# Patient Record
Sex: MALE | Race: WHITE | NOT HISPANIC OR LATINO | Employment: OTHER | URBAN - METROPOLITAN AREA
[De-identification: names, ages, dates, MRNs, and addresses within clinical notes are randomized per-mention and may not be internally consistent; named-entity substitution may affect disease eponyms.]

---

## 2017-07-05 ENCOUNTER — APPOINTMENT (OUTPATIENT)
Dept: LAB | Facility: HOSPITAL | Age: 67
End: 2017-07-05
Attending: UROLOGY
Payer: MEDICARE

## 2017-07-05 ENCOUNTER — TRANSCRIBE ORDERS (OUTPATIENT)
Dept: LAB | Facility: HOSPITAL | Age: 67
End: 2017-07-05

## 2017-07-05 ENCOUNTER — HOSPITAL ENCOUNTER (OUTPATIENT)
Dept: RADIOLOGY | Facility: HOSPITAL | Age: 67
Discharge: HOME | End: 2017-07-05
Attending: UROLOGY
Payer: MEDICARE

## 2017-07-05 DIAGNOSIS — N40.0 ENLARGED PROSTATE WITHOUT LOWER URINARY TRACT SYMPTOMS (LUTS): ICD-10-CM

## 2017-07-05 DIAGNOSIS — Z12.5 SPECIAL SCREENING FOR MALIGNANT NEOPLASM OF PROSTATE: Primary | ICD-10-CM

## 2017-07-05 DIAGNOSIS — N20.0 CALCULUS OF KIDNEY: ICD-10-CM

## 2017-07-05 DIAGNOSIS — Z12.5 ENCOUNTER FOR SCREENING FOR MALIGNANT NEOPLASM OF PROSTATE: ICD-10-CM

## 2017-07-05 LAB — PSA SERPL-MCNC: 2.9 NG/ML (ref 0–4)

## 2017-07-05 PROCEDURE — 74000 HB X-RAY EXAM OF ABDOMEN (SINGLE ANTEROPOSTERIOR VIEW): CPT

## 2017-07-05 PROCEDURE — 36415 COLL VENOUS BLD VENIPUNCTURE: CPT

## 2017-07-05 PROCEDURE — G0103 PSA SCREENING: HCPCS

## 2017-07-11 ENCOUNTER — ALLSCRIPTS OFFICE VISIT (OUTPATIENT)
Dept: OTHER | Facility: OTHER | Age: 67
End: 2017-07-11

## 2017-07-11 DIAGNOSIS — N20.0 CALCULUS OF KIDNEY: ICD-10-CM

## 2017-07-11 DIAGNOSIS — Z12.5 ENCOUNTER FOR SCREENING FOR MALIGNANT NEOPLASM OF PROSTATE: ICD-10-CM

## 2017-10-31 ENCOUNTER — ALLSCRIPTS OFFICE VISIT (OUTPATIENT)
Dept: OTHER | Facility: OTHER | Age: 67
End: 2017-10-31

## 2017-10-31 DIAGNOSIS — R73.01 IMPAIRED FASTING GLUCOSE: ICD-10-CM

## 2017-10-31 DIAGNOSIS — E78.5 HYPERLIPIDEMIA: ICD-10-CM
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
## 2017-11-13 ENCOUNTER — APPOINTMENT (OUTPATIENT)
Dept: LAB | Facility: HOSPITAL | Age: 67
End: 2017-11-13
Payer: MEDICARE

## 2017-11-13 ENCOUNTER — GENERIC CONVERSION - ENCOUNTER (OUTPATIENT)
Dept: OTHER | Facility: OTHER | Age: 67
End: 2017-11-13

## 2017-11-13 ENCOUNTER — TRANSCRIBE ORDERS (OUTPATIENT)
Dept: LAB | Facility: HOSPITAL | Age: 67
End: 2017-11-13

## 2017-11-13 DIAGNOSIS — R73.01 IMPAIRED FASTING GLUCOSE: ICD-10-CM

## 2017-11-13 DIAGNOSIS — E78.5 HYPERLIPIDEMIA: ICD-10-CM

## 2017-11-13 LAB
ALBUMIN SERPL BCP-MCNC: 3.9 G/DL (ref 3.5–5)
ALP SERPL-CCNC: 37 U/L (ref 46–116)
ALT SERPL W P-5'-P-CCNC: 38 U/L (ref 12–78)
ANION GAP SERPL CALCULATED.3IONS-SCNC: 6 MMOL/L (ref 4–13)
AST SERPL W P-5'-P-CCNC: 32 U/L (ref 5–45)
BILIRUB SERPL-MCNC: 0.74 MG/DL (ref 0.2–1)
BUN SERPL-MCNC: 18 MG/DL (ref 5–25)
CALCIUM SERPL-MCNC: 9.2 MG/DL (ref 8.3–10.1)
CHLORIDE SERPL-SCNC: 105 MMOL/L (ref 100–108)
CHOLEST SERPL-MCNC: 145 MG/DL (ref 50–200)
CO2 SERPL-SCNC: 31 MMOL/L (ref 21–32)
CREAT SERPL-MCNC: 1.26 MG/DL (ref 0.6–1.3)
EST. AVERAGE GLUCOSE BLD GHB EST-MCNC: 126 MG/DL
GFR SERPL CREATININE-BSD FRML MDRD: 59 ML/MIN/1.73SQ M
GLUCOSE P FAST SERPL-MCNC: 77 MG/DL (ref 65–99)
HBA1C MFR BLD: 6 % (ref 4.2–6.3)
HDLC SERPL-MCNC: 37 MG/DL (ref 40–60)
LDLC SERPL CALC-MCNC: 74 MG/DL (ref 0–100)
POTASSIUM SERPL-SCNC: 3.4 MMOL/L (ref 3.5–5.3)
PROT SERPL-MCNC: 7.6 G/DL (ref 6.4–8.2)
SODIUM SERPL-SCNC: 142 MMOL/L (ref 136–145)
TRIGL SERPL-MCNC: 169 MG/DL

## 2017-11-13 PROCEDURE — 83036 HEMOGLOBIN GLYCOSYLATED A1C: CPT

## 2017-11-13 PROCEDURE — 36415 COLL VENOUS BLD VENIPUNCTURE: CPT

## 2017-11-13 PROCEDURE — 80053 COMPREHEN METABOLIC PANEL: CPT

## 2017-11-13 PROCEDURE — 80061 LIPID PANEL: CPT

## 2018-01-09 NOTE — RESULT NOTES
Verified Results  (1) COMPREHENSIVE METABOLIC PANEL 50CWI4276 34:31ZS Kelly Bellamy    Order Number: [id number]     Test Name Result Flag Reference   SODIUM 142 mmol/L  136-145   POTASSIUM 3 4 mmol/L L 3 5-5 3   CHLORIDE 105 mmol/L  100-108   CARBON DIOXIDE 31 mmol/L  21-32   ANION GAP (CALC) 6 mmol/L  4-13   BLOOD UREA NITROGEN 18 mg/dL  5-25   CREATININE 1 26 mg/dL  0 60-1 30   Standardized to IDMS reference method   CALCIUM 9 2 mg/dL  8 3-10 1   BILI, TOTAL 0 74 mg/dL  0 20-1 00   ALK PHOSPHATAS 37 U/L L    ALT (SGPT) 38 U/L  12-78   Specimen collection should occur prior to Sulfasalazine and/or Sulfapyridine administration due to the potential for falsely depressed results  AST(SGOT) 32 U/L  5-45   Specimen collection should occur prior to Sulfasalazine administration due to the potential for falsely depressed results  ALBUMIN 3 9 g/dL  3 5-5 0   TOTAL PROTEIN 7 6 g/dL  6 4-8 2   eGFR 59 ml/min/1 73sq m     [facility] Disease Education Program recommendations are as follows:  GFR calculation is accurate only with a steady state creatinine  Chronic Kidney disease less than 60 ml/min/1 73 sq  meters  Kidney failure less than 15 ml/min/1 73 sq  meters  GLUCOSE FASTING 77 mg/dL  65-99   Specimen collection should occur prior to Sulfasalazine administration due to the potential for falsely depressed results  Specimen collection should occur prior to Sulfapyridine administration due to the potential for falsely elevated results  (1) LIPID PANEL, FASTING 68BUW3108 09:03AM J[facility] Order Number: [id number]     Test Name Result Flag Reference   CHOLESTEROL 145 mg/dL     HDL,DIRECT 37 mg/dL L 40-60   Specimen collection should occur prior to Metamizole administration due to the potential for falsley depressed results     LDL CHOLESTEROL CALCULATED 74 mg/dL  0-100   Triglyceride:        Normal <150 mg/dl   Borderline High 150-199 mg/dl   High 200-499 mg/dl   Very High >499 mg/dl      Cholesterol:       Desirable <200 mg/dl    Borderline High 200-239 mg/dl    High >239 mg/dl      HDL Cholesterol:       High>59 mg/dL    Low <41 mg/dL      This screening LDL is a calculated result  It does not have the accuracy of the Direct Measured LDL in the monitoring of patients with hyperlipidemia and/or statin therapy  Direct Measure LDL (GET083) must be ordered separately in these patients  TRIGLYCERIDES 169 mg/dL H <=150   Specimen collection should occur prior to N-Acetylcysteine or Metamizole administration due to the potential for falsely depressed results  (1) HEMOGLOBIN A1C 13FBX3178 09:03AM Patricia Brand Order Number: [id number]     Test Name Result Flag Reference   HEMOGLOBIN A1C 6 0 %  4 2-6 3   EST  AVG   GLUCOSE 126 mg/dl

## 2018-01-12 NOTE — RESULT NOTES
Message  I called patient with blood work results  Recommended to follow a low fat, low cholesterol diet,  regular exercise  Continue current medications  Recommended to increase dietary potassium intake:  bananas,  orange juice  Recheck labs in one year  Patient declined Medicare wellness visit  Verified Results  (1) CBC/PLT/DIFF 07SCX2651 07:45AM Chris Bacay   TW Order Number: [id number]     Test Name Result Flag Reference   WBC COUNT 5 75 Thousand/uL  4 31-10 16   RBC COUNT 4 67 Million/uL  3 88-5 62   HEMOGLOBIN 13 8 g/dL  12 0-17 0   HEMATOCRIT 41 1 %  36 5-49 3   MCV 88 fL  82-98   MCH 29 6 pg  26 8-34 3   MCHC 33 6 g/dL  31 4-37 4   RDW 13 7 %  11 6-15 1   MPV 12 2 fL  8 9-12 7   PLATELET COUNT 553 Thousands/uL  149-390   nRBC AUTOMATED 0 /100 WBCs     NEUTROPHILS RELATIVE PERCENT 46 %  43-75   LYMPHOCYTES RELATIVE PERCENT 38 %  14-44   MONOCYTES RELATIVE PERCENT 12 %  4-12   EOSINOPHILS RELATIVE PERCENT 3 %  0-6   BASOPHILS RELATIVE PERCENT 1 %  0-1   NEUTROPHILS ABSOLUTE COUNT 2 70 Thousands/?L  1 85-7 62   LYMPHOCYTES ABSOLUTE COUNT 2 17 Thousands/?L  0 60-4 47   MONOCYTES ABSOLUTE COUNT 0 66 Thousand/?L  0 17-1 22   EOSINOPHILS ABSOLUTE COUNT 0 15 Thousand/?L  0 00-0 61   BASOPHILS ABSOLUTE COUNT 0 06 Thousands/?L  0 00-0 10   - Patient Instructions: This bloodwork is non-fasting  Please drink two glasses of water morning of bloodwork  - Patient Instructions: This bloodwork is non-fasting  Please drink two glasses of water morning of bloodwork  (1) COMPREHENSIVE METABOLIC PANEL 23YET8570 79:79LU Chris Bacay    Order Number: [id number]     Test Name Result Flag Reference   GLUCOSE,RANDM 92 mg/dL     If the patient is fasting, the ADA then defines impaired fasting glucose as > 100 mg/dL and diabetes as > or equal to 123 mg/dL     SODIUM 139 mmol/L  136-145   POTASSIUM 3 5 mmol/L  3 5-5 3   CHLORIDE 102 mmol/L  100-108   CARBON DIOXIDE 31 mmol/L 21-32   ANION GAP (CALC) 6 mmol/L  4-13   BLOOD UREA NITROGEN 19 mg/dL  5-25   CREATININE 1 25 mg/dL  0 60-1 30   Standardized to IDMS reference method   CALCIUM 9 2 mg/dL  8 3-10 1   BILI, TOTAL 0 44 mg/dL  0 20-1 00   ALK PHOSPHATAS 42 U/L L    ALT (SGPT) 37 U/L  12-78   AST(SGOT) 22 U/L  5-45   ALBUMIN 4 0 g/dL  3 5-5 0   TOTAL PROTEIN 7 7 g/dL  6 4-8 2   eGFR Non-African American 57 8 ml/min/1 73sq m     - Patient Instructions: This is a fasting blood test  Water,black tea or black  coffee only after 9:00pm the night before test Drink 2 glasses of water the morning of test - Patient Instructions: This bloodwork is non-fasting  Please drink two glasses of   water morning of bloodwork  National Kidney Disease Education Program recommendations are as follows:  GFR calculation is accurate only with a steady state creatinine  Chronic Kidney disease less than 60 ml/min/1 73 sq  meters  Kidney failure less than 15 ml/min/1 73 sq  meters  (1) TSH 34FJC1418 07:45AM Lew Callaway Order Number: [id number]     Test Name Result Flag Reference   TSH 3 330 uIU/mL  0 358-3 740   - Patient Instructions: This bloodwork is non-fasting  Please drink two glasses of water morning of bloodwork  - Patient Instructions: This is a fasting blood test  Water,black tea or black  coffee only after 9:00pm the night before test Drink 2 glasses of water the morning of test - Patient Instructions: This bloodwork is non-fasting  Please drink two glasses of   water morning of bloodwork  Patients undergoing fluorescein dye angiography may retain small amounts of fluorescein in the body for 48-72 hours post procedure  Samples containing fluorescein can produce falsely depressed TSH values  If the patient had this procedure,a specimen should be resubmitted post fluorescein clearance       (1) VITAMIN B12 08TNC3352 07:45AM Lew Callaway Order Number: [id number]     Test Name Result Flag Reference VITAMIN B12 398 pg/mL  100-900   - Patient Instructions: This is a fasting blood test  Water,black tea or black  coffee only after 9:00pm the night before test Drink 2 glasses of water the morning of test - Patient Instructions: This bloodwork is non-fasting  Please drink two glasses of   water morning of bloodwork  (1) HEMOGLOBIN A1C 11UOE4668 07:45AM [facility] Order Number: [id number]     Test Name Result Flag Reference   HEMOGLOBIN A1C 5 9 %  4 2-6 3   EST  AVG  GLUCOSE 123 mg/dl       (1) LIPID PANEL, FASTING [id number] 07:45AM Khurram Naylor Order Number: [id number]     Test Name Result Flag Reference   CHOLESTEROL 168 mg/dL     HDL,DIRECT 39 mg/dL L 40-60   Specimen collection should occur prior to Metamizole administration due to the potential for falsely depressed results  LDL CHOLESTEROL CALCULATED 89 mg/dL  0-100   - Patient Instructions: This is a fasting blood test  Water,black tea or black  coffee only after 9:00pm the night before test   Drink 2 glasses of water the morning of test     - Patient Instructions: This is a fasting blood test  Water,black tea or black  coffee only after 9:00pm the night before test Drink 2 glasses of water the morning of test - Patient Instructions: This bloodwork is non-fasting  Please drink two glasses of   water morning of bloodwork  Triglyceride:         Normal              <150 mg/dl       Borderline High    150-199 mg/dl       High               200-499 mg/dl       Very High          >499 mg/dl  Cholesterol:         Desirable        <200 mg/dl      Borderline High  200-239 mg/dl      High             >239 mg/dl  HDL Cholesterol:        High    >59 mg/dL      Low     <41 mg/dL  LDL CALCULATED:    This screening LDL is a calculated result  It does not have the accuracy of the Direct Measured LDL in the monitoring of patients with hyperlipidemia and/or statin therapy     Direct Measure LDL (LAE291) must be ordered separately in these patients  TRIGLYCERIDES 200 mg/dL H <=150   Specimen collection should occur prior to N-Acetylcysteine or Metamizole administration due to the potential for falsely depressed results         Signatures   Electronically signed by : KERRIE Daly ; Nov  3 2016 10:36AM EST                       (Author)

## 2018-01-13 VITALS
HEIGHT: 68 IN | BODY MASS INDEX: 27.13 KG/M2 | DIASTOLIC BLOOD PRESSURE: 72 MMHG | SYSTOLIC BLOOD PRESSURE: 112 MMHG | HEART RATE: 60 BPM | WEIGHT: 179 LBS

## 2018-01-14 VITALS
HEIGHT: 68 IN | TEMPERATURE: 97.2 F | RESPIRATION RATE: 16 BRPM | SYSTOLIC BLOOD PRESSURE: 118 MMHG | HEART RATE: 60 BPM | DIASTOLIC BLOOD PRESSURE: 80 MMHG | BODY MASS INDEX: 27.58 KG/M2 | WEIGHT: 182 LBS

## 2018-07-09 ENCOUNTER — APPOINTMENT (OUTPATIENT)
Dept: LAB | Facility: HOSPITAL | Age: 68
End: 2018-07-09
Attending: UROLOGY
Payer: MEDICARE

## 2018-07-09 ENCOUNTER — HOSPITAL ENCOUNTER (OUTPATIENT)
Dept: RADIOLOGY | Facility: HOSPITAL | Age: 68
Discharge: HOME | End: 2018-07-09
Attending: UROLOGY
Payer: MEDICARE

## 2018-07-09 ENCOUNTER — TRANSCRIBE ORDERS (OUTPATIENT)
Dept: RADIOLOGY | Facility: HOSPITAL | Age: 68
End: 2018-07-09

## 2018-07-09 DIAGNOSIS — N20.0 CALCULUS OF KIDNEY: ICD-10-CM

## 2018-07-09 DIAGNOSIS — Z12.5 ENCOUNTER FOR SCREENING FOR MALIGNANT NEOPLASM OF PROSTATE: ICD-10-CM

## 2018-07-09 LAB — PSA SERPL-MCNC: 2.9 NG/ML (ref 0–4)

## 2018-07-09 PROCEDURE — 36415 COLL VENOUS BLD VENIPUNCTURE: CPT

## 2018-07-09 PROCEDURE — 74018 RADEX ABDOMEN 1 VIEW: CPT

## 2018-07-09 PROCEDURE — G0103 PSA SCREENING: HCPCS

## 2018-09-23 DIAGNOSIS — E78.2 MIXED HYPERLIPIDEMIA: Primary | ICD-10-CM

## 2018-09-23 RX ORDER — EZETIMIBE 10 MG/1
TABLET ORAL
Qty: 90 TABLET | Refills: 3 | Status: SHIPPED | OUTPATIENT
Start: 2018-09-23 | End: 2019-10-01 | Stop reason: SDUPTHER

## 2018-09-23 RX ORDER — FENOFIBRATE 160 MG/1
TABLET ORAL
Qty: 90 TABLET | Refills: 3 | Status: SHIPPED | OUTPATIENT
Start: 2018-09-23 | End: 2018-11-29 | Stop reason: SDUPTHER

## 2018-11-27 DIAGNOSIS — E78.2 MIXED HYPERLIPIDEMIA: ICD-10-CM

## 2018-11-27 RX ORDER — FENOFIBRATE 160 MG/1
160 TABLET ORAL DAILY
Qty: 90 TABLET | Refills: 3 | Status: CANCELLED | OUTPATIENT
Start: 2018-11-27 | End: 2019-02-25

## 2018-11-27 NOTE — TELEPHONE ENCOUNTER
Patient's wife is calling for name brand Triglide 160 mg to [facility]  Patient's wife was told by [facility] they did not have the generic and could not fill prescriiption the last time  Can be reached at [phone]  Please advise

## 2018-11-29 DIAGNOSIS — E78.2 MIXED HYPERLIPIDEMIA: ICD-10-CM

## 2018-11-29 RX ORDER — FENOFIBRATE 160 MG/1
160 TABLET ORAL DAILY
Qty: 90 TABLET | Refills: 0 | Status: SHIPPED | OUTPATIENT
Start: 2018-11-29 | End: 2019-01-09 | Stop reason: SDUPTHER

## 2018-11-29 NOTE — TELEPHONE ENCOUNTER
Requesting  ( Brand only )  TRIGLIDE  160mg,  Qty 90,        Take 1 tablet by mouth pawel    Send to [employer] [facility]

## 2018-11-30 ENCOUNTER — TELEPHONE (OUTPATIENT)
Dept: FAMILY MEDICINE CLINIC | Facility: CLINIC | Age: 68
End: 2018-11-30

## 2018-11-30 NOTE — TELEPHONE ENCOUNTER
Spoke with Car Marin, he is requesting                                        Fenofibrate                 (generic-insurance will only cover generic)                 160mg  Qty 90,  Take 1 tablet by mouth daily                Send to [address] said, he never requested Brand name only

## 2018-11-30 NOTE — TELEPHONE ENCOUNTER
I called pharmacy and made it the generic version verbally, and then called patient to notify him that it is in process and they will ship it to him

## 2019-01-03 DIAGNOSIS — E78.2 MIXED HYPERLIPIDEMIA: ICD-10-CM

## 2019-01-03 RX ORDER — FENOFIBRATE 160 MG/1
160 TABLET ORAL DAILY
Qty: 90 TABLET | Refills: 0 | OUTPATIENT
Start: 2019-01-03

## 2019-01-03 RX ORDER — EZETIMIBE 10 MG/1
10 TABLET ORAL DAILY
Qty: 90 TABLET | Refills: 0 | OUTPATIENT
Start: 2019-01-03

## 2019-01-03 NOTE — TELEPHONE ENCOUNTER
Patient is requesting a refill of the following medications to [facility]:  1  Fenofibrate 160 mg-INSURANCE WILL NOT PAY FOR TRIGLIDE, take 1 tablet daily, 90-day supply with refills  2  Ezetimibe (ZETIA) 10 mg tablet, take 1 tablet daily, 90-day supply-may substitute name brand  Patient can be contacted at [phone] with any questions

## 2019-01-09 DIAGNOSIS — E78.2 MIXED HYPERLIPIDEMIA: ICD-10-CM

## 2019-01-09 RX ORDER — FENOFIBRATE 160 MG/1
TABLET ORAL
Qty: 90 TABLET | Refills: 3 | Status: SHIPPED | OUTPATIENT
Start: 2019-01-09 | End: 2019-11-18 | Stop reason: SDUPTHER

## 2019-01-18 NOTE — TELEPHONE ENCOUNTER
Spoke with patient, he feels that it is unfair to withhold medications from him, prior to him scheduleing an appointment  Patient was last seen 10-  Refused to schedule an appointment at this time  Let him know I will relay this information to his Doctor

## 2019-10-01 DIAGNOSIS — E78.2 MIXED HYPERLIPIDEMIA: ICD-10-CM

## 2019-10-01 RX ORDER — EZETIMIBE 10 MG/1
10 TABLET ORAL DAILY
Qty: 90 TABLET | Refills: 0 | Status: SHIPPED | OUTPATIENT
Start: 2019-10-01 | End: 2019-12-23 | Stop reason: SDUPTHER

## 2019-10-01 NOTE — TELEPHONE ENCOUNTER
I called and spoke to patient's wife  They will call back to schedule an appointment to continue to get medication refills

## 2019-10-01 NOTE — TELEPHONE ENCOUNTER
Please call patient  Pharmacy sent a request to refill prescription for Zetia 10 mg daily  Patient was last seen in the office in October 2017  Recommend to schedule follow-up office visit in a few weeks

## 2019-11-18 DIAGNOSIS — E78.2 MIXED HYPERLIPIDEMIA: ICD-10-CM

## 2019-11-18 RX ORDER — FENOFIBRATE 160 MG/1
TABLET ORAL
Qty: 90 TABLET | Refills: 0 | Status: SHIPPED | OUTPATIENT
Start: 2019-11-18 | End: 2019-12-23 | Stop reason: SDUPTHER

## 2019-11-19 NOTE — TELEPHONE ENCOUNTER
Please call patient  He was last seen October 2017, needs to schedule follow-up visit  May schedule appointment with ANNITA Albert next month

## 2019-11-19 NOTE — TELEPHONE ENCOUNTER
I left a message asking patient to call back to schedule an appointment  Please keep an eye out and help to schedule him if he calls back, thank you

## 2019-11-27 NOTE — TELEPHONE ENCOUNTER
Lmom for patient to call/schedule a follow-up appointment  Also mailed postcard to patient to call/schedule a follow up appointment

## 2019-12-22 PROBLEM — N40.1 BPH ASSOCIATED WITH NOCTURIA: Status: ACTIVE | Noted: 2019-12-22

## 2019-12-22 PROBLEM — R73.01 IFG (IMPAIRED FASTING GLUCOSE): Status: ACTIVE | Noted: 2019-12-22

## 2019-12-22 PROBLEM — R35.1 BPH ASSOCIATED WITH NOCTURIA: Status: ACTIVE | Noted: 2019-12-22

## 2019-12-22 PROBLEM — E78.2 MIXED HYPERLIPIDEMIA: Status: ACTIVE | Noted: 2019-12-22

## 2019-12-22 PROBLEM — Z00.00 MEDICARE ANNUAL WELLNESS VISIT, INITIAL: Status: ACTIVE | Noted: 2019-12-22

## 2019-12-22 PROBLEM — N20.0 NEPHROLITHIASIS: Status: ACTIVE | Noted: 2019-12-22

## 2019-12-23 ENCOUNTER — OFFICE VISIT (OUTPATIENT)
Dept: FAMILY MEDICINE CLINIC | Facility: CLINIC | Age: 69
End: 2019-12-23
Payer: MEDICARE

## 2019-12-23 VITALS
OXYGEN SATURATION: 96 % | DIASTOLIC BLOOD PRESSURE: 84 MMHG | HEART RATE: 74 BPM | SYSTOLIC BLOOD PRESSURE: 144 MMHG | TEMPERATURE: 97.7 F | HEIGHT: 67 IN | WEIGHT: 179 LBS | RESPIRATION RATE: 16 BRPM | BODY MASS INDEX: 28.09 KG/M2

## 2019-12-23 DIAGNOSIS — E78.2 MIXED HYPERLIPIDEMIA: Primary | ICD-10-CM

## 2019-12-23 DIAGNOSIS — Z00.00 MEDICARE ANNUAL WELLNESS VISIT, INITIAL: ICD-10-CM

## 2019-12-23 DIAGNOSIS — N20.0 NEPHROLITHIASIS: ICD-10-CM

## 2019-12-23 DIAGNOSIS — R73.01 IFG (IMPAIRED FASTING GLUCOSE): ICD-10-CM

## 2019-12-23 DIAGNOSIS — Z11.59 NEED FOR HEPATITIS C SCREENING TEST: ICD-10-CM

## 2019-12-23 DIAGNOSIS — Z12.5 SCREENING FOR PROSTATE CANCER: ICD-10-CM

## 2019-12-23 DIAGNOSIS — N40.1 BPH ASSOCIATED WITH NOCTURIA: ICD-10-CM

## 2019-12-23 DIAGNOSIS — R35.1 BPH ASSOCIATED WITH NOCTURIA: ICD-10-CM

## 2019-12-23 PROCEDURE — G0438 PPPS, INITIAL VISIT: HCPCS | Performed by: FAMILY MEDICINE

## 2019-12-23 PROCEDURE — 99213 OFFICE O/P EST LOW 20 MIN: CPT | Performed by: FAMILY MEDICINE

## 2019-12-23 RX ORDER — FENOFIBRATE 160 MG/1
160 TABLET ORAL DAILY
Qty: 90 TABLET | Refills: 3 | Status: SHIPPED | OUTPATIENT
Start: 2019-12-23 | End: 2020-11-06 | Stop reason: SDUPTHER

## 2019-12-23 RX ORDER — EZETIMIBE 10 MG/1
10 TABLET ORAL DAILY
Qty: 90 TABLET | Refills: 3 | Status: SHIPPED | OUTPATIENT
Start: 2019-12-23 | End: 2019-12-23 | Stop reason: SDUPTHER

## 2019-12-23 RX ORDER — EZETIMIBE 10 MG/1
10 TABLET ORAL DAILY
Qty: 90 TABLET | Refills: 3 | Status: SHIPPED | OUTPATIENT
Start: 2019-12-23 | End: 2020-05-22

## 2019-12-23 NOTE — PROGRESS NOTES
Assessment and Plan:     Problem List Items Addressed This Visit        Endocrine    IFG (impaired fasting glucose)       Genitourinary    Nephrolithiasis       Other    Mixed hyperlipidemia - Primary    BPH associated with nocturia    Medicare annual wellness visit, initial      Other Visit Diagnoses     Screening for prostate cancer        Screening for thyroid disorder        Need for hepatitis C screening test               Preventive health issues were discussed with patient, and age appropriate screening tests were ordered as noted in patient's After Visit Summary  Personalized health advice and appropriate referrals for health education or preventive services given if needed, as noted in patient's After Visit Summary  History of Present Illness:     Patient presents for Medicare Annual Wellness visit    Patient Care Team:  Maria Teresa Quintero MD as PCP - General  Jayden Horton MD     Problem List:     Patient Active Problem List   Diagnosis    Mixed hyperlipidemia    IFG (impaired fasting glucose)    Nephrolithiasis    BPH associated with nocturia    Medicare annual wellness visit, initial      Past Medical and Surgical History:     Past Medical History:   Diagnosis Date    Allergic rhinitis     GERD (gastroesophageal reflux disease)     HL (hearing loss)     Wears hearing aids    Hyperlipidemia     Tinnitus      Past Surgical History:   Procedure Laterality Date    ADENOIDECTOMY      INGUINAL HERNIA REPAIR      TONSILLECTOMY      WISDOM TOOTH EXTRACTION        Family History:     No family history on file     Social History:     Social History     Socioeconomic History    Marital status: /Civil Union     Spouse name: None    Number of children: None    Years of education: None    Highest education level: None   Occupational History    None   Social Needs    Financial resource strain: None    Food insecurity:     Worry: None     Inability: None    Transportation needs: Medical: None     Non-medical: None   Tobacco Use    Smoking status: Never Smoker    Smokeless tobacco: Never Used   Substance and Sexual Activity    Alcohol use: No    Drug use: No    Sexual activity: None   Lifestyle    Physical activity:     Days per week: None     Minutes per session: None    Stress: None   Relationships    Social connections:     Talks on phone: None     Gets together: None     Attends Alevism service: None     Active member of club or organization: None     Attends meetings of clubs or organizations: None     Relationship status: None    Intimate partner violence:     Fear of current or ex partner: None     Emotionally abused: None     Physically abused: None     Forced sexual activity: None   Other Topics Concern    None   Social History Narrative    None       Medications and Allergies:     Current Outpatient Medications   Medication Sig Dispense Refill    azelastine (ASTELIN) 0 1 % nasal spray 2 sprays into each nostril 2 (two) times a day 1 Bottle 6    Calcium Carb-Cholecalciferol 600-800 MG-UNIT TABS Take by mouth      ezetimibe (ZETIA) 10 mg tablet Take 1 tablet (10 mg total) by mouth daily 90 tablet 0    fenofibrate (TRIGLIDE) 160 MG tablet TAKE 1 TABLET BY MOUTH  DAILY 90 tablet 0    Glucosamine-Chondroitin--400-375 MG TABS Take by mouth      ipratropium (ATROVENT) 0 06 % nasal spray 2 sprays into each nostril 4 (four) times a day 45 mL 3    multivitamin (THERAGRAN) TABS Take by mouth       No current facility-administered medications for this visit  Allergies   Allergen Reactions    Other      Environmental      Immunizations: There is no immunization history for the selected administration types on file for this patient     Health Maintenance:         Topic Date Due    Hepatitis C Screening  1950    CRC Screening: Colonoscopy  03/21/2019         Topic Date Due    DTaP,Tdap,and Td Vaccines (1 - Tdap) 09/01/1961    Pneumococcal Vaccine: 65+ Years (1 of 2 - PCV13) 09/01/2015      Medicare Health Risk Assessment:     /84 (BP Location: Left arm, Patient Position: Sitting, Cuff Size: Adult)   Pulse 74   Temp 97 7 °F (36 5 °C) (Tympanic)   Resp 14   Ht 5' 6 75" (1 695 m)   Wt 81 2 kg (179 lb)   SpO2 96%   BMI 28 25 kg/m²      Martha Dobbins is here for his Initial Wellness visit  Health Risk Assessment:   Patient rates overall health as good  Patient feels that their physical health rating is same  Eyesight was rated as same  Hearing was rated as same  Patient feels that their emotional and mental health rating is same  Pain experienced in the last 7 days has been none  Patient states that he has experienced no weight loss or gain in last 6 months  Depression Screening:   PHQ-2 Score: 0      Fall Risk Screening: In the past year, patient has experienced: no history of falling in past year      Home Safety:  Patient does not have trouble with stairs inside or outside of their home  Patient has working smoke alarms and has working carbon monoxide detector  Home safety hazards include: none  Nutrition:   Current diet is Regular  Medications:   Patient is currently taking over-the-counter supplements  OTC medications include: see medication list  Patient is able to manage medications  Activities of Daily Living (ADLs)/Instrumental Activities of Daily Living (IADLs):   Walk and transfer into and out of bed and chair?: Yes  Dress and groom yourself?: Yes    Bathe or shower yourself?: Yes    Feed yourself? Yes  Do your laundry/housekeeping?: Yes  Manage your money, pay your bills and track your expenses?: Yes  Make your own meals?: Yes    Do your own shopping?: Yes    Previous Hospitalizations:   Any hospitalizations or ED visits within the last 12 months?: No      Advance Care Planning:   Living will: Yes    Durable POA for healthcare:  Yes    Advanced directive: Yes    Advanced directive counseling given: Yes      Cognitive Screening:   Provider or family/friend/caregiver concerned regarding cognition?: No    PREVENTIVE SCREENINGS      Cardiovascular Screening:    General: Screening Not Indicated and History Lipid Disorder      Colorectal Cancer Screening:     General: Screening Current      Prostate Cancer Screening:    General: Risks and Benefits Discussed      Osteoporosis Screening:    General: Screening Not Indicated and Risks and Benefits Discussed      Abdominal Aortic Aneurysm (AAA) Screening:    Risk factors include: age between 73-69 yo        General: Screening Not Indicated and Risks and Benefits Discussed      Lung Cancer Screening:     General: Screening Not Indicated      Hepatitis C Screening:    General: Risks and Benefits Discussed    Hep C Screening Accepted: Yes      Other Counseling Topics:   Car/seat belt/driving safety, skin self-exam and calcium and vitamin D intake and regular weightbearing exercise  BMI Counseling: Body mass index is 28 25 kg/m²  The BMI is above normal  Nutrition recommendations include reducing portion sizes, decreasing overall calorie intake, 3-5 servings of fruits/vegetables daily, reducing fast food intake, consuming healthier snacks, decreasing soda and/or juice intake, moderation in carbohydrate intake, increasing intake of lean protein, reducing intake of saturated fat and trans fat and reducing intake of cholesterol  Exercise recommendations include moderate aerobic physical activity for 150 minutes/week      Mark Cannon MD

## 2019-12-23 NOTE — PATIENT INSTRUCTIONS

## 2019-12-23 NOTE — ASSESSMENT & PLAN NOTE
Continue Fenofibrate 160 mg, Zetia 10 mg daily  Check CMP, lipid panel  Recommended to follow a low-cholesterol, low-fat diet, regular exercise

## 2019-12-23 NOTE — PROGRESS NOTES
Assessment/Plan:    Mixed hyperlipidemia  Continue Fenofibrate 160 mg, Zetia 10 mg daily  Check CMP, lipid panel  Recommended to follow a low-cholesterol, low-fat diet, regular exercise  IFG (impaired fasting glucose)  Follow a low carb diet, avoid concentrated sweets  Check Hemoglobin A1C  Nephrolithiasis  Encouraged to stay well-hydrated  BPH associated with nocturia  Symptoms are stable  Patient declined follow-up with urology  Check PSA level  HM: patient is due for screening colonoscopy due to history of colon polyps  Refusing immunizations  Schedule follow-up office visit in 1 year with Medicare AW  Call office with any acute problems  Diagnoses and all orders for this visit:    Mixed hyperlipidemia  -     Comprehensive metabolic panel; Future  -     Lipid panel; Future  -     Discontinue: ezetimibe (ZETIA) 10 mg tablet; Take 1 tablet (10 mg total) by mouth daily  -     fenofibrate (TRIGLIDE) 160 MG tablet; Take 1 tablet (160 mg total) by mouth daily  -     ezetimibe (ZETIA) 10 mg tablet; Take 1 tablet (10 mg total) by mouth daily    IFG (impaired fasting glucose)  -     Comprehensive metabolic panel; Future  -     Hemoglobin A1C; Future    Nephrolithiasis  -     CBC and differential; Future    BPH associated with nocturia    Medicare annual wellness visit, initial    Screening for prostate cancer  -     PSA, Total Screen; Future    Need for hepatitis C screening test  -     Hepatitis C antibody; Future    Other orders  -     Cancel: TSH, 3rd generation with Free T4 reflex; Future          Subjective:      Patient ID: Latoya Reardon  is a 71 y o  male  HPI     Patient presents for follow-up office visit accompanied by his wife  Last seen in October 2017  PMHx: Hyperlipidemia, impaired fasting glucose, kidney stones, BPH  Reviewed current medications  Last blood test done in July 2018  PSA level was 2 9      Patient states that he has not been followed by urology  He has nocturia x 1  Denies weak stream  No difficulty urinating  No dysuria  Hyperlipidemia -currently taking Fenofibrate 160 mg daily, Zetia 10 mg daily  He is active around his house  No H/o falls  Patient had colonoscopy done in March 2014  Dr Charo Santana recommended to recheck colonoscopy in 5 years due to history of colon polyps  Denies tobacco use  Patient declined Flu shot, Pneumovax, Prevnar 13 vaccination  The following portions of the patient's history were reviewed and updated as appropriate: allergies, current medications, past medical history, past social history, past surgical history and problem list     Review of Systems      Objective:      /84 (BP Location: Left arm, Patient Position: Sitting, Cuff Size: Standard)   Pulse 74   Temp 97 7 °F (36 5 °C) (Tympanic)   Resp 16   Ht 5' 6 75" (1 695 m)   Wt 81 2 kg (179 lb)   SpO2 96%   BMI 28 25 kg/m²          Physical Exam   Constitutional: He appears well-developed and well-nourished  HENT:   Head: Normocephalic and atraumatic  Right Ear: External ear normal    Left Ear: External ear normal    Mouth/Throat: Oropharynx is clear and moist    Eyes: Pupils are equal, round, and reactive to light  Conjunctivae are normal    Neck: Normal range of motion  Neck supple  No JVD present  Cardiovascular: Normal rate, regular rhythm and normal heart sounds  No murmur heard  No carotid bruits BL  No BL LE edema   Pulmonary/Chest: Effort normal and breath sounds normal    Abdominal: Soft  Bowel sounds are normal  There is no tenderness  Musculoskeletal: Normal range of motion  He exhibits no edema, tenderness or deformity  Skin: Skin is warm and dry  No rash noted  Psychiatric: He has a normal mood and affect  Nursing note and vitals reviewed

## 2020-01-09 ENCOUNTER — LAB (OUTPATIENT)
Dept: LAB | Facility: HOSPITAL | Age: 70
End: 2020-01-09
Payer: MEDICARE

## 2020-01-09 DIAGNOSIS — Z12.5 SCREENING FOR PROSTATE CANCER: ICD-10-CM

## 2020-01-09 DIAGNOSIS — R73.01 IFG (IMPAIRED FASTING GLUCOSE): ICD-10-CM

## 2020-01-09 DIAGNOSIS — Z11.59 NEED FOR HEPATITIS C SCREENING TEST: ICD-10-CM

## 2020-01-09 DIAGNOSIS — R97.20 ELEVATED PSA: Primary | ICD-10-CM

## 2020-01-09 DIAGNOSIS — N20.0 NEPHROLITHIASIS: ICD-10-CM

## 2020-01-09 DIAGNOSIS — E78.2 MIXED HYPERLIPIDEMIA: ICD-10-CM

## 2020-01-09 LAB
ALBUMIN SERPL BCP-MCNC: 4.3 G/DL (ref 3.5–5)
ALP SERPL-CCNC: 38 U/L (ref 46–116)
ALT SERPL W P-5'-P-CCNC: 42 U/L (ref 12–78)
ANION GAP SERPL CALCULATED.3IONS-SCNC: 2 MMOL/L (ref 4–13)
AST SERPL W P-5'-P-CCNC: 25 U/L (ref 5–45)
BASOPHILS # BLD AUTO: 0.07 THOUSANDS/ΜL (ref 0–0.1)
BASOPHILS NFR BLD AUTO: 1 % (ref 0–1)
BILIRUB SERPL-MCNC: 0.4 MG/DL (ref 0.2–1)
BUN SERPL-MCNC: 22 MG/DL (ref 5–25)
CALCIUM SERPL-MCNC: 10.1 MG/DL (ref 8.3–10.1)
CHLORIDE SERPL-SCNC: 107 MMOL/L (ref 100–108)
CHOLEST SERPL-MCNC: 168 MG/DL (ref 50–200)
CO2 SERPL-SCNC: 29 MMOL/L (ref 21–32)
CREAT SERPL-MCNC: 1.3 MG/DL (ref 0.6–1.3)
EOSINOPHIL # BLD AUTO: 0.39 THOUSAND/ΜL (ref 0–0.61)
EOSINOPHIL NFR BLD AUTO: 6 % (ref 0–6)
ERYTHROCYTE [DISTWIDTH] IN BLOOD BY AUTOMATED COUNT: 13.8 % (ref 11.6–15.1)
EST. AVERAGE GLUCOSE BLD GHB EST-MCNC: 128 MG/DL
GFR SERPL CREATININE-BSD FRML MDRD: 56 ML/MIN/1.73SQ M
GLUCOSE P FAST SERPL-MCNC: 93 MG/DL (ref 65–99)
HBA1C MFR BLD: 6.1 % (ref 4.2–6.3)
HCT VFR BLD AUTO: 44.6 % (ref 36.5–49.3)
HCV AB SER QL: NORMAL
HDLC SERPL-MCNC: 36 MG/DL
HGB BLD-MCNC: 14.3 G/DL (ref 12–17)
IMM GRANULOCYTES # BLD AUTO: 0.02 THOUSAND/UL (ref 0–0.2)
IMM GRANULOCYTES NFR BLD AUTO: 0 % (ref 0–2)
LDLC SERPL CALC-MCNC: 101 MG/DL (ref 0–100)
LYMPHOCYTES # BLD AUTO: 2.01 THOUSANDS/ΜL (ref 0.6–4.47)
LYMPHOCYTES NFR BLD AUTO: 31 % (ref 14–44)
MCH RBC QN AUTO: 29.1 PG (ref 26.8–34.3)
MCHC RBC AUTO-ENTMCNC: 32.1 G/DL (ref 31.4–37.4)
MCV RBC AUTO: 91 FL (ref 82–98)
MONOCYTES # BLD AUTO: 0.73 THOUSAND/ΜL (ref 0.17–1.22)
MONOCYTES NFR BLD AUTO: 11 % (ref 4–12)
NEUTROPHILS # BLD AUTO: 3.33 THOUSANDS/ΜL (ref 1.85–7.62)
NEUTS SEG NFR BLD AUTO: 51 % (ref 43–75)
NONHDLC SERPL-MCNC: 132 MG/DL
NRBC BLD AUTO-RTO: 0 /100 WBCS
PLATELET # BLD AUTO: 254 THOUSANDS/UL (ref 149–390)
PMV BLD AUTO: 11.7 FL (ref 8.9–12.7)
POTASSIUM SERPL-SCNC: 4.3 MMOL/L (ref 3.5–5.3)
PROT SERPL-MCNC: 8.2 G/DL (ref 6.4–8.2)
PSA SERPL-MCNC: 4.2 NG/ML (ref 0–4)
RBC # BLD AUTO: 4.92 MILLION/UL (ref 3.88–5.62)
SODIUM SERPL-SCNC: 138 MMOL/L (ref 136–145)
TRIGL SERPL-MCNC: 154 MG/DL
WBC # BLD AUTO: 6.55 THOUSAND/UL (ref 4.31–10.16)

## 2020-01-09 PROCEDURE — 80053 COMPREHEN METABOLIC PANEL: CPT

## 2020-01-09 PROCEDURE — 85025 COMPLETE CBC W/AUTO DIFF WBC: CPT

## 2020-01-09 PROCEDURE — 80061 LIPID PANEL: CPT

## 2020-01-09 PROCEDURE — 86803 HEPATITIS C AB TEST: CPT

## 2020-01-09 PROCEDURE — 36415 COLL VENOUS BLD VENIPUNCTURE: CPT

## 2020-01-09 PROCEDURE — 83036 HEMOGLOBIN GLYCOSYLATED A1C: CPT

## 2020-01-09 PROCEDURE — G0103 PSA SCREENING: HCPCS

## 2020-01-10 ENCOUNTER — TELEPHONE (OUTPATIENT)
Dept: UROLOGY | Facility: MEDICAL CENTER | Age: 70
End: 2020-01-10

## 2020-01-10 NOTE — TELEPHONE ENCOUNTER
Call placed to patient, advised that I could accommodate him sooner with an AP  Patient states that he only wants to see Dr Armen Mcintyre  I advised that he has been booked for first available  Patient is agreeable to keeping appointment as scheduled

## 2020-01-10 NOTE — TELEPHONE ENCOUNTER
This is a patient of Dr Aysha Dubose seen in [place]  His primary told him his PSA is elevated  I made him an appointment on 2/6/20 with Dr Nikki Nino  If you think he should be seen earlier please call patient at [phone]

## 2020-02-06 ENCOUNTER — OFFICE VISIT (OUTPATIENT)
Dept: UROLOGY | Facility: AMBULATORY SURGERY CENTER | Age: 70
End: 2020-02-06
Payer: MEDICARE

## 2020-02-06 VITALS
HEIGHT: 68 IN | WEIGHT: 185 LBS | BODY MASS INDEX: 28.04 KG/M2 | HEART RATE: 63 BPM | DIASTOLIC BLOOD PRESSURE: 82 MMHG | SYSTOLIC BLOOD PRESSURE: 138 MMHG

## 2020-02-06 DIAGNOSIS — R35.1 BPH ASSOCIATED WITH NOCTURIA: ICD-10-CM

## 2020-02-06 DIAGNOSIS — N20.0 NEPHROLITHIASIS: ICD-10-CM

## 2020-02-06 DIAGNOSIS — N40.1 BPH ASSOCIATED WITH NOCTURIA: ICD-10-CM

## 2020-02-06 DIAGNOSIS — R97.20 ELEVATED PSA: Primary | ICD-10-CM

## 2020-02-06 PROCEDURE — 1160F RVW MEDS BY RX/DR IN RCRD: CPT | Performed by: UROLOGY

## 2020-02-06 PROCEDURE — 99213 OFFICE O/P EST LOW 20 MIN: CPT | Performed by: UROLOGY

## 2020-02-06 PROCEDURE — 3008F BODY MASS INDEX DOCD: CPT | Performed by: UROLOGY

## 2020-02-06 PROCEDURE — 1036F TOBACCO NON-USER: CPT | Performed by: UROLOGY

## 2020-02-06 RX ORDER — TAMSULOSIN HYDROCHLORIDE 0.4 MG/1
0.4 CAPSULE ORAL
Qty: 30 CAPSULE | Refills: 3 | Status: SHIPPED | OUTPATIENT
Start: 2020-02-06 | End: 2020-12-28 | Stop reason: ALTCHOICE

## 2020-02-06 NOTE — PROGRESS NOTES
Assessment/Plan:    Elevated PSA    We discussed prostate cancer screening and PSA at depth  We discussed that the only way to tell whether he has prostate cancer or not is with biopsy  We discussed the procedure of a prostate biopsy including the risks and benefits  We discussed options including proceeding with biopsy or observing the PSA  I recommended close observation of the PSA  He will follow up in 3 months with repeat testing  BPH associated with nocturia  We discussed his urinary symptoms  He is amendable to trying tamsulosin  Side effects were discussed  We will see how this is working at his next follow-up appointment  Nephrolithiasis  We discussed that he had stones on his last x-ray over a year ago  He has not been having any symptoms and defers getting an x-ray at this time  Diagnoses and all orders for this visit:    Elevated PSA  -     PSA Total, Diagnostic; Future    BPH associated with nocturia  -     tamsulosin (FLOMAX) 0 4 mg; Take 1 capsule (0 4 mg total) by mouth daily with dinner    Nephrolithiasis          Total visit time was 15 minutes of which over 50% was spent on counseling  Subjective:     Patient ID: María Flores  is a 71 y o  male    70-year-old male with history of enlarged prostate and nephrolithiasis presents for rising PSA  The patient denies any recent urinary tract infections, gross hematuria, dysuria, or prostate instrumentation  He has never been on any prostate medications  He denies any flank pain  He has no other complaints  The following portions of the patient's history were reviewed and updated as appropriate: allergies, current medications, past family history, past medical history, past social history, past surgical history and problem list     Review of Systems   Constitutional: Negative  HENT: Negative  Eyes: Negative  Respiratory: Negative  Cardiovascular: Negative  Gastrointestinal: Negative  Endocrine: Negative  Genitourinary:        As noted per HPI   Musculoskeletal: Negative  Skin: Negative  Allergic/Immunologic: Negative  Neurological: Negative  Hematological: Negative  Psychiatric/Behavioral: Negative  Objective:    Physical Exam   Constitutional: He is oriented to person, place, and time  He appears well-developed and well-nourished  Neck: Normal range of motion  Cardiovascular: Intact distal pulses  Pulmonary/Chest: Effort normal    Abdominal: Soft  Bowel sounds are normal  He exhibits no distension and no mass  There is no tenderness  There is no rebound and no guarding  Musculoskeletal: Normal range of motion  Neurological: He is alert and oriented to person, place, and time  Skin: Skin is warm and dry  Psychiatric: He has a normal mood and affect  Vitals reviewed          Results  Lab Results   Component Value Date    PSA 4 2 (H) 01/09/2020    PSA 2 9 07/09/2018    PSA 2 9 07/05/2017     Lab Results   Component Value Date    GLUCOSE 127 12/24/2015    CALCIUM 10 1 01/09/2020     12/24/2015    K 4 3 01/09/2020    CO2 29 01/09/2020     01/09/2020    BUN 22 01/09/2020    CREATININE 1 30 01/09/2020     Lab Results   Component Value Date    WBC 6 55 01/09/2020    HGB 14 3 01/09/2020    HCT 44 6 01/09/2020    MCV 91 01/09/2020     01/09/2020       No results found for this or any previous visit (from the past 1 hour(s)) ]

## 2020-02-06 NOTE — ASSESSMENT & PLAN NOTE
We discussed that he had stones on his last x-ray over a year ago  He has not been having any symptoms and defers getting an x-ray at this time

## 2020-02-06 NOTE — ASSESSMENT & PLAN NOTE
We discussed prostate cancer screening and PSA at depth  We discussed that the only way to tell whether he has prostate cancer or not is with biopsy  We discussed the procedure of a prostate biopsy including the risks and benefits  We discussed options including proceeding with biopsy or observing the PSA  I recommended close observation of the PSA  He will follow up in 3 months with repeat testing

## 2020-02-06 NOTE — ASSESSMENT & PLAN NOTE
We discussed his urinary symptoms  He is amendable to trying tamsulosin  Side effects were discussed  We will see how this is working at his next follow-up appointment

## 2020-02-17 ENCOUNTER — APPOINTMENT (EMERGENCY)
Dept: RADIOLOGY | Facility: HOSPITAL | Age: 70
End: 2020-02-17
Payer: MEDICARE

## 2020-02-17 ENCOUNTER — HOSPITAL ENCOUNTER (EMERGENCY)
Facility: HOSPITAL | Age: 70
Discharge: HOME | End: 2020-02-17
Attending: EMERGENCY MEDICINE | Admitting: EMERGENCY MEDICINE
Payer: MEDICARE

## 2020-02-17 VITALS
SYSTOLIC BLOOD PRESSURE: 128 MMHG | TEMPERATURE: 97.9 F | WEIGHT: 184.97 LBS | OXYGEN SATURATION: 95 % | RESPIRATION RATE: 16 BRPM | DIASTOLIC BLOOD PRESSURE: 80 MMHG | BODY MASS INDEX: 28.12 KG/M2 | HEART RATE: 68 BPM

## 2020-02-17 DIAGNOSIS — N20.0 KIDNEY STONES: ICD-10-CM

## 2020-02-17 DIAGNOSIS — R10.32 LEFT LOWER QUADRANT ABDOMINAL PAIN: Primary | ICD-10-CM

## 2020-02-17 LAB
ALBUMIN SERPL BCP-MCNC: 4.2 G/DL (ref 3.5–5)
ALP SERPL-CCNC: 41 U/L (ref 46–116)
ALT SERPL W P-5'-P-CCNC: 41 U/L (ref 12–78)
ANION GAP SERPL CALCULATED.3IONS-SCNC: 4 MMOL/L (ref 4–13)
AST SERPL W P-5'-P-CCNC: 29 U/L (ref 5–45)
BASOPHILS # BLD AUTO: 0.06 THOUSANDS/ΜL (ref 0–0.1)
BASOPHILS NFR BLD AUTO: 1 % (ref 0–1)
BILIRUB SERPL-MCNC: 0.45 MG/DL (ref 0.2–1)
BILIRUB UR QL STRIP: NEGATIVE
BUN SERPL-MCNC: 14 MG/DL (ref 5–25)
CALCIUM SERPL-MCNC: 10 MG/DL (ref 8.3–10.1)
CHLORIDE SERPL-SCNC: 104 MMOL/L (ref 100–108)
CLARITY UR: CLEAR
CLARITY, POC: CLEAR
CO2 SERPL-SCNC: 29 MMOL/L (ref 21–32)
COLOR UR: YELLOW
COLOR, POC: YELLOW
CREAT SERPL-MCNC: 1.33 MG/DL (ref 0.6–1.3)
EOSINOPHIL # BLD AUTO: 0.05 THOUSAND/ΜL (ref 0–0.61)
EOSINOPHIL NFR BLD AUTO: 1 % (ref 0–6)
ERYTHROCYTE [DISTWIDTH] IN BLOOD BY AUTOMATED COUNT: 13.8 % (ref 11.6–15.1)
GFR SERPL CREATININE-BSD FRML MDRD: 54 ML/MIN/1.73SQ M
GLUCOSE SERPL-MCNC: 95 MG/DL (ref 65–140)
GLUCOSE UR STRIP-MCNC: NEGATIVE MG/DL
HCT VFR BLD AUTO: 42.9 % (ref 36.5–49.3)
HGB BLD-MCNC: 14.3 G/DL (ref 12–17)
HGB UR QL STRIP.AUTO: NEGATIVE
IMM GRANULOCYTES # BLD AUTO: 0.02 THOUSAND/UL (ref 0–0.2)
IMM GRANULOCYTES NFR BLD AUTO: 0 % (ref 0–2)
KETONES UR STRIP-MCNC: NEGATIVE MG/DL
LEUKOCYTE ESTERASE UR QL STRIP: NEGATIVE
LIPASE SERPL-CCNC: 173 U/L (ref 73–393)
LYMPHOCYTES # BLD AUTO: 1.47 THOUSANDS/ΜL (ref 0.6–4.47)
LYMPHOCYTES NFR BLD AUTO: 28 % (ref 14–44)
MCH RBC QN AUTO: 29.9 PG (ref 26.8–34.3)
MCHC RBC AUTO-ENTMCNC: 33.3 G/DL (ref 31.4–37.4)
MCV RBC AUTO: 90 FL (ref 82–98)
MONOCYTES # BLD AUTO: 0.95 THOUSAND/ΜL (ref 0.17–1.22)
MONOCYTES NFR BLD AUTO: 18 % (ref 4–12)
NEUTROPHILS # BLD AUTO: 2.74 THOUSANDS/ΜL (ref 1.85–7.62)
NEUTS SEG NFR BLD AUTO: 52 % (ref 43–75)
NITRITE UR QL STRIP: NEGATIVE
NRBC BLD AUTO-RTO: 0 /100 WBCS
PH UR STRIP.AUTO: 5.5 [PH] (ref 4.5–8)
PLATELET # BLD AUTO: 228 THOUSANDS/UL (ref 149–390)
PMV BLD AUTO: 11.7 FL (ref 8.9–12.7)
POTASSIUM SERPL-SCNC: 3.5 MMOL/L (ref 3.5–5.3)
PROT SERPL-MCNC: 8.2 G/DL (ref 6.4–8.2)
PROT UR STRIP-MCNC: NEGATIVE MG/DL
RBC # BLD AUTO: 4.78 MILLION/UL (ref 3.88–5.62)
SODIUM SERPL-SCNC: 137 MMOL/L (ref 136–145)
SP GR UR STRIP.AUTO: 1.02 (ref 1–1.03)
UROBILINOGEN UR QL STRIP.AUTO: 0.2 E.U./DL
WBC # BLD AUTO: 5.29 THOUSAND/UL (ref 4.31–10.16)

## 2020-02-17 PROCEDURE — 85025 COMPLETE CBC W/AUTO DIFF WBC: CPT | Performed by: EMERGENCY MEDICINE

## 2020-02-17 PROCEDURE — 80053 COMPREHEN METABOLIC PANEL: CPT | Performed by: EMERGENCY MEDICINE

## 2020-02-17 PROCEDURE — 99284 EMERGENCY DEPT VISIT MOD MDM: CPT | Performed by: EMERGENCY MEDICINE

## 2020-02-17 PROCEDURE — 81003 URINALYSIS AUTO W/O SCOPE: CPT

## 2020-02-17 PROCEDURE — 74177 CT ABD & PELVIS W/CONTRAST: CPT

## 2020-02-17 PROCEDURE — 83690 ASSAY OF LIPASE: CPT | Performed by: EMERGENCY MEDICINE

## 2020-02-17 PROCEDURE — 99284 EMERGENCY DEPT VISIT MOD MDM: CPT

## 2020-02-17 PROCEDURE — 36415 COLL VENOUS BLD VENIPUNCTURE: CPT

## 2020-02-17 RX ORDER — DICYCLOMINE HCL 20 MG
20 TABLET ORAL EVERY 6 HOURS
Qty: 20 TABLET | Refills: 0 | Status: SHIPPED | OUTPATIENT
Start: 2020-02-17 | End: 2021-10-13 | Stop reason: ALTCHOICE

## 2020-02-17 RX ORDER — DICYCLOMINE HCL 20 MG
20 TABLET ORAL ONCE
Status: COMPLETED | OUTPATIENT
Start: 2020-02-17 | End: 2020-02-17

## 2020-02-17 RX ADMIN — IOHEXOL 100 ML: 350 INJECTION, SOLUTION INTRAVENOUS at 12:28

## 2020-02-17 RX ADMIN — DICYCLOMINE HYDROCHLORIDE 20 MG: 20 TABLET ORAL at 12:09

## 2020-02-17 NOTE — ED PROVIDER NOTES
History  Chief Complaint   Patient presents with    Abdominal Pain     Pt states he has a  lot of gas and LLQ pain  70-year-old male history of hyperlipidemia and GERD presenting with 1 week history of left lower quadrant discomfort  Patient stated he has been having waxing/waning sharp/stabbing pain in his left lower quadrant over the past week lasting for few hours of time then self-resolving  Patient unaware of any provoking factors  Patient having normal bowel movements with 1-2 bowel movements a day that are normal per the patient  Denies any hematochezia or melena  He denies any abnormal food intake  He denies any prior abdominal surgery or illnesses to his knowledge including but limited to inflammatory bowel disease or diverticulosis  He denies any changes in his urination  No nausea vomiting  Patient ultimately asymptomatic at the moment with moderate resolution of the severe stabbing pain within the last hour  Additionally he denies any headache, vision changes, chest pain, shortness of breath, or fever/chills  Prior to Admission Medications   Prescriptions Last Dose Informant Patient Reported? Taking?    Calcium Carb-Cholecalciferol 600-800 MG-UNIT TABS  Self Yes No   Sig: Take by mouth   Glucosamine-Chondroitin--646-323 MG TABS  Self Yes No   Sig: Take by mouth   azelastine (ASTELIN) 0 1 % nasal spray  Self No No   Si sprays into each nostril 2 (two) times a day   ezetimibe (ZETIA) 10 mg tablet  Self No No   Sig: Take 1 tablet (10 mg total) by mouth daily   fenofibrate (TRIGLIDE) 160 MG tablet  Self No No   Sig: Take 1 tablet (160 mg total) by mouth daily   ipratropium (ATROVENT) 0 06 % nasal spray  Self No No   Si sprays into each nostril 4 (four) times a day   multivitamin (THERAGRAN) TABS  Self Yes No   Sig: Take by mouth   tamsulosin (FLOMAX) 0 4 mg   No No   Sig: Take 1 capsule (0 4 mg total) by mouth daily with dinner      Facility-Administered Medications: None       Past Medical History:   Diagnosis Date    Allergic rhinitis     GERD (gastroesophageal reflux disease)     HL (hearing loss)     Wears hearing aids    Hyperlipidemia     Tinnitus        Past Surgical History:   Procedure Laterality Date    ADENOIDECTOMY      INGUINAL HERNIA REPAIR      TONSILLECTOMY      WISDOM TOOTH EXTRACTION         History reviewed  No pertinent family history  I have reviewed and agree with the history as documented  Social History     Tobacco Use    Smoking status: Never Smoker    Smokeless tobacco: Never Used   Substance Use Topics    Alcohol use: No    Drug use: No        Review of Systems   Constitutional: Negative for appetite change, chills, diaphoresis, fever and unexpected weight change  HENT: Negative for congestion and rhinorrhea  Eyes: Negative for photophobia and visual disturbance  Respiratory: Negative for cough, chest tightness and shortness of breath  Cardiovascular: Negative for chest pain, palpitations and leg swelling  Gastrointestinal: Positive for abdominal pain  Negative for abdominal distention, blood in stool, constipation, diarrhea, nausea and vomiting  Genitourinary: Negative for dysuria and hematuria  Musculoskeletal: Negative for back pain, joint swelling, neck pain and neck stiffness  Skin: Negative for color change, pallor, rash and wound  Neurological: Negative for dizziness, syncope, weakness, light-headedness and headaches  Psychiatric/Behavioral: Negative for agitation  All other systems reviewed and are negative        Physical Exam  ED Triage Vitals   Temperature Pulse Respirations Blood Pressure SpO2   02/17/20 1049 02/17/20 1049 02/17/20 1049 02/17/20 1049 02/17/20 1049   97 9 °F (36 6 °C) 69 18 164/86 97 %      Temp Source Heart Rate Source Patient Position - Orthostatic VS BP Location FiO2 (%)   02/17/20 1049 02/17/20 1232 02/17/20 1232 02/17/20 1333 --   Oral Monitor Sitting Right arm       Pain Score 02/17/20 1049       8             Orthostatic Vital Signs  Vitals:    02/17/20 1130 02/17/20 1200 02/17/20 1232 02/17/20 1333   BP: 130/79 133/85 138/87 128/80   Pulse: 70 68 70 68   Patient Position - Orthostatic VS:   Sitting Sitting       Physical Exam   Constitutional: He is oriented to person, place, and time  He appears well-developed and well-nourished  HENT:   Head: Normocephalic and atraumatic  Nose: Nose normal    Mouth/Throat: Oropharynx is clear and moist    Eyes: Pupils are equal, round, and reactive to light  EOM are normal  Right eye exhibits no discharge  Left eye exhibits no discharge  Neck: Normal range of motion  Neck supple  No JVD present  No tracheal deviation present  Cardiovascular: Normal rate, regular rhythm, normal heart sounds and intact distal pulses  Exam reveals no gallop and no friction rub  No murmur heard  Pulmonary/Chest: Effort normal and breath sounds normal  No stridor  No respiratory distress  He has no wheezes  He has no rales  He exhibits no tenderness  Abdominal: Soft  Bowel sounds are normal  He exhibits no distension  There is no tenderness  There is no rigidity, no rebound, no guarding and no CVA tenderness  Musculoskeletal: Normal range of motion  He exhibits no edema, tenderness or deformity  Lymphadenopathy:     He has no cervical adenopathy  Neurological: He is alert and oriented to person, place, and time  No cranial nerve deficit or sensory deficit  He exhibits normal muscle tone  Coordination normal    Skin: Skin is warm and dry  No rash noted  He is not diaphoretic  No erythema  No pallor  Psychiatric: He has a normal mood and affect  His behavior is normal  Thought content normal    Nursing note and vitals reviewed        ED Medications  Medications   dicyclomine (BENTYL) tablet 20 mg (20 mg Oral Given 2/17/20 1209)   iohexol (OMNIPAQUE) 350 MG/ML injection (MULTI-DOSE) 100 mL (100 mL Intravenous Given 2/17/20 1228)       Diagnostic Studies  Results Reviewed     Procedure Component Value Units Date/Time    POCT urinalysis dipstick [[id number]]  (Normal) Resulted:  02/17/20 1315    Lab Status:  Final result Specimen:  Urine Updated:  02/17/20 1315     Color, UA Yellow     Clarity, UA Clear    Urine Macroscopic, POC [[id number]] Collected:  02/17/20 1318    Lab Status:  Final result Specimen:  Urine Updated:  02/17/20 1315     Color, UA Yellow     Clarity, UA Clear     pH, UA 5 5     Leukocytes, UA Negative     Nitrite, UA Negative     Protein, UA Negative mg/dl      Glucose, UA Negative mg/dl      Ketones, UA Negative mg/dl      Urobilinogen, UA 0 2 E U /dl      Bilirubin, UA Negative     Blood, UA Negative     Specific Gravity, UA 1 020    Narrative:       CLINITEK RESULT    Comprehensive metabolic panel [[id number]]  (Abnormal) Collected:  02/17/20 1056    Lab Status:  Final result Specimen:  Blood from Arm, Left Updated:  02/17/20 1130     Sodium 137 mmol/L      Potassium 3 5 mmol/L      Chloride 104 mmol/L      CO2 29 mmol/L      ANION GAP 4 mmol/L      BUN 14 mg/dL      Creatinine 1 33 mg/dL      Glucose 95 mg/dL      Calcium 10 0 mg/dL      AST 29 U/L      ALT 41 U/L      Alkaline Phosphatase 41 U/L      Total Protein 8 2 g/dL      Albumin 4 2 g/dL      Total Bilirubin 0 45 mg/dL      eGFR 54 ml/min/1 73sq m     Narrative:       [facility] guidelines for Chronic Kidney Disease (CKD):     Stage 1 with normal or high GFR (GFR > 90 mL/min/1 73 square meters)    Stage 2 Mild CKD (GFR = 60-89 mL/min/1 73 square meters)    Stage 3A Moderate CKD (GFR = 45-59 mL/min/1 73 square meters)    Stage 3B Moderate CKD (GFR = 30-44 mL/min/1 73 square meters)    Stage 4 Severe CKD (GFR = 15-29 mL/min/1 73 square meters)    Stage 5 End Stage CKD (GFR <15 mL/min/1 73 square meters)  Note: GFR calculation is accurate only with a steady state creatinine    Lipase [[id number]]  (Normal) Collected:  02/17/20 1056    Lab Status:  Final result Specimen:  Blood from Arm, Left Updated:  02/17/20 1130     Lipase 173 u/L     CBC and differential [[id number]]  (Abnormal) Collected:  02/17/20 1056    Lab Status:  Final result Specimen:  Blood from Arm, Left Updated:  02/17/20 1110     WBC 5 29 Thousand/uL      RBC 4 78 Million/uL      Hemoglobin 14 3 g/dL      Hematocrit 42 9 %      MCV 90 fL      MCH 29 9 pg      MCHC 33 3 g/dL      RDW 13 8 %      MPV 11 7 fL      Platelets 626 Thousands/uL      nRBC 0 /100 WBCs      Neutrophils Relative 52 %      Immat GRANS % 0 %      Lymphocytes Relative 28 %      Monocytes Relative 18 %      Eosinophils Relative 1 %      Basophils Relative 1 %      Neutrophils Absolute 2 74 Thousands/µL      Immature Grans Absolute 0 02 Thousand/uL      Lymphocytes Absolute 1 47 Thousands/µL      Monocytes Absolute 0 95 Thousand/µL      Eosinophils Absolute 0 05 Thousand/µL      Basophils Absolute 0 06 Thousands/µL                  CT abdomen pelvis with contrast   Final Result by Dior Blanco MD (02/17 1258)      4 mm calculus in the left UPJ with mild upstream hydronephrosis  No perinephric collection  2 mm bilateral renal calculi  9 mm bladder calculus  Bilateral renal simple cysts, the largest of which measures 3 cm on the left  Hepatic steatosis  Moderate prostatomegaly  This study demonstrates a significant  finding and was documented as such in [facility] for liaison and referring practitioner notification  Workstation performed: [id number]               Procedures  Procedures      ED Course           Identification of Seniors at Risk      Most Recent Value   (ISAR) Identification of Seniors at Risk   Before the illness or injury that brought you to the Emergency, did you need someone to help you on a regular basis?   0 Filed at: 02/17/2020 1050   In the last 24 hours, have you needed more help than usual?  0 Filed at: 02/17/2020 1050   Have you been hospitalized for one or more nights during the past 6 months? 0 Filed at: 02/17/2020 1050   In general, do you see well?  0 Filed at: 02/17/2020 1050   In general, do you have serious problems with your memory? 0 Filed at: 02/17/2020 1050   Do you take more than three different medications every day?  --   ISAR Score  0 Filed at: 02/17/2020 1050                          MDM  Number of Diagnoses or Management Options  Kidney stones:   Left lower quadrant abdominal pain:   Diagnosis management comments: 79-year-old male history of hyperlipidemia and GERD presenting with 1 week history of left lower quadrant discomfort  Based on patient age in location of waxing waning discomfort in left lower quadrant, possibly related to diverticulosis  Patient no prior history of inflammatory bowel disease and currently outside the age range at the current time for initial presentation  Patient without pain out of portion on exam no history of other risk factors including AFib so ischemic colitis is very unlikely at this time  Plan to assess basic labs including abdominal labs  CT abdomen pelvis  Labs within normal limits  Patient now having increasing discomfort left lower quadrant  Exam remains unchanged without tenderness to palpation  Plan to give Bentyl will reassess  Discomfort almost completely resolved after Bentyl  CT notable for for kidney stones including a 9 mm stone in his bladder in a 4 mm stone in his left UPJ with a 2 mm stone in each of his kidneys  Severe pain over the weekend likely related to passage of the 9 mm stone  Urine negative  Patient without urinary infection, severe pain, nausea vomiting, or any other concerning signs for blockage of kidney stone so plan to manage expectantly with urology follow-up  Also given improvement of symptoms with Bentyl gave patient prescription for Bentyl and recommended GI follow-up and keeping his colonoscopy appointment  Patient given strict return precautions and instructions  Patient also given urine strainer with instructions and specimen cup to collect kidney stones  Patient discharged  Amount and/or Complexity of Data Reviewed  Clinical lab tests: reviewed and ordered  Tests in the radiology section of CPT®: ordered and reviewed  Tests in the medicine section of CPT®: ordered and reviewed  Review and summarize past medical records: yes  Independent visualization of images, tracings, or specimens: yes    Risk of Complications, Morbidity, and/or Mortality  Presenting problems: low  Diagnostic procedures: low  Management options: low    Patient Progress  Patient progress: improved        Disposition  Final diagnoses:   Left lower quadrant abdominal pain   Kidney stones     Time reflects when diagnosis was documented in both MDM as applicable and the Disposition within this note     Time User Action Codes Description Comment    2/17/2020  1:33 PM Keanu Chan Add [R10 32] Left lower quadrant abdominal pain     2/17/2020  1:34 PM Keanu Chan Add [N20 0] Kidney stones       ED Disposition     ED Disposition Condition Date/Time Comment    Discharge Stable Mon Feb 17, 2020  1:28 PM María Look  discharge to home/self care  Follow-up Information     Follow up With Specialties Details Why Contact Info Additional Information    Derrick Escobedo MD Urology Schedule an appointment as soon as possible for a visit  Please call the above number to schedule appointment with urology for kidney stones   Patricia        Allan Wei MD Family Medicine Schedule an appointment as soon as possible for a visit   [facility] [address] [place] [address] [phone]       [facility] Emergency Department Emergency Medicine Go to  If symptoms worsen [address] ED, [address], [facility] [address]    [facility] Schedule an appointment as soon as possible for a visit  follow up and colonoscopy [address] [facility] [address] [facility] [address] [phone] [facility] [address], [place], [place], [address]          Discharge Medication List as of 2/17/2020  2:12 PM      START taking these medications    Details   dicyclomine (BENTYL) 20 mg tablet Take 1 tablet (20 mg total) by mouth every 6 (six) hours, Starting Mon 2/17/2020, Print         CONTINUE these medications which have NOT CHANGED    Details   azelastine (ASTELIN) 0 1 % nasal spray 2 sprays into each nostril 2 (two) times a day, Starting Thu 11/21/2019, Normal      Calcium Carb-Cholecalciferol 600-800 MG-UNIT TABS Take by mouth, Historical Med      ezetimibe (ZETIA) 10 mg tablet Take 1 tablet (10 mg total) by mouth daily, Starting Mon 12/23/2019, Until Sun 3/22/2020, Print      fenofibrate (TRIGLIDE) 160 MG tablet Take 1 tablet (160 mg total) by mouth daily, Starting Mon 12/23/2019, Print      Glucosamine-Chondroitin--400-375 MG TABS Take by mouth, Historical Med      ipratropium (ATROVENT) 0 06 % nasal spray 2 sprays into each nostril 4 (four) times a day, Starting Tue 5/21/2019, Print      multivitamin (THERAGRAN) TABS Take by mouth, Historical Med      tamsulosin (FLOMAX) 0 4 mg Take 1 capsule (0 4 mg total) by mouth daily with dinner, Starting Thu 2/6/2020, Print           No discharge procedures on file  ED Provider  Attending physically available and evaluated Prosper Deonna CAMPBELL managed the patient along with the ED Attending      Electronically Signed by         Katia Salcido MD  02/18/20 3787

## 2020-02-17 NOTE — ED NOTES
Patient transported to [facility], [address] [facility]  02/17/20 6598

## 2020-02-17 NOTE — DISCHARGE INSTRUCTIONS
You came in to the emergency department today with abdominal pain  Which check some labs which are normal including your urine and we also checked a CT scan of your abdomen  We found 4 different kidney stones:  A 9 mm stone in her bladder, a 4 mm stone in your left ureter, and a 2 mm stone in each of your kidneys  Please follow-up with urology and call the below number to set up an appointment urology the next couple days  Please return if you have any signs or symptoms of urinary tract infection including but limited to bladder distension and pain when urinate  Please also return if he had any signs or symptoms of urinary retention including inability to urinate, bladder distension, pain around the area of her bladder  Please also return if you have excruciating pain unrelieved with over-the-counter medications or nausea and vomiting  We are giving you a prescription for Bentyl to be used as needed for abdominal discomfort  You may also use over-the-counter pain medications such as Tylenol as indicated on the bottle  Please also follow-up with gastroenterologist for colonoscopy and follow-up  Please follow up with your primary care provider  We also sent home with a urine strainer to be used every time urinate to collect potential passing kidney stones  Please save the kidney stones in the urine specimen cup and bring the cup with you when you follow-up with urology  I also recommend using the BRAT diet for the next few days and then slowly introducing regular foods back into your diet  Please refer to the following documents for additional instructions and return precautions

## 2020-02-24 NOTE — ED ATTENDING ATTESTATION
2/17/2020  I, Cam Pedraza MD, saw and evaluated the patient  I have discussed the patient with the resident/non-physician practitioner and agree with the resident's/non-physician practitioner's findings, Plan of Care, and MDM as documented in the resident's/non-physician practitioner's note, except where noted  All available labs and Radiology studies were reviewed  I was present for key portions of any procedure(s) performed by the resident/non-physician practitioner and I was immediately available to provide assistance  At this point I agree with the current assessment done in the Emergency Department  I have conducted an independent evaluation of this patient a history and physical is as follows:      72 yo male with h/o waxing and waning LLQ abdominal pain - patientg has attempted to self medicate through pain with OTC aids no relief  Patien denies hematuria or dark colored urine  No CVAT  Abdomen is soft nT/ND + BS  No sighns of peritonitis    Labs and CTAP ordered and reviewed - findings  suggests that patient sx c/w renal and ureteral colic based on imaging  NO sign of infection or urinary retention at this time and patient pain is adequately controlled    Stable for dc - follow up with urology   Will   ED Course         Critical Care Time  Procedures

## 2020-04-23 ENCOUNTER — TELEPHONE (OUTPATIENT)
Dept: UROLOGY | Facility: AMBULATORY SURGERY CENTER | Age: 70
End: 2020-04-23

## 2020-05-16 DIAGNOSIS — R10.32 LEFT LOWER QUADRANT ABDOMINAL PAIN: ICD-10-CM

## 2020-05-16 PROCEDURE — U0003 INFECTIOUS AGENT DETECTION BY NUCLEIC ACID (DNA OR RNA); SEVERE ACUTE RESPIRATORY SYNDROME CORONAVIRUS 2 (SARS-COV-2) (CORONAVIRUS DISEASE [COVID-19]), AMPLIFIED PROBE TECHNIQUE, MAKING USE OF HIGH THROUGHPUT TECHNOLOGIES AS DESCRIBED BY CMS-2020-01-R: HCPCS

## 2020-05-18 LAB — SARS-COV-2 RNA SPEC QL NAA+PROBE: NOT DETECTED

## 2020-05-21 ENCOUNTER — ANESTHESIA EVENT (OUTPATIENT)
Dept: GASTROENTEROLOGY | Facility: HOSPITAL | Age: 70
End: 2020-05-21

## 2020-05-21 RX ORDER — SODIUM CHLORIDE, SODIUM LACTATE, POTASSIUM CHLORIDE, CALCIUM CHLORIDE 600; 310; 30; 20 MG/100ML; MG/100ML; MG/100ML; MG/100ML
125 INJECTION, SOLUTION INTRAVENOUS CONTINUOUS
Status: CANCELLED | OUTPATIENT
Start: 2020-05-21

## 2020-05-21 RX ORDER — LIDOCAINE HYDROCHLORIDE 10 MG/ML
0.5 INJECTION, SOLUTION EPIDURAL; INFILTRATION; INTRACAUDAL; PERINEURAL ONCE AS NEEDED
Status: CANCELLED | OUTPATIENT
Start: 2020-05-21

## 2020-05-22 ENCOUNTER — HOSPITAL ENCOUNTER (OUTPATIENT)
Dept: GASTROENTEROLOGY | Facility: HOSPITAL | Age: 70
Setting detail: OUTPATIENT SURGERY
Discharge: HOME | End: 2020-05-22
Attending: COLON & RECTAL SURGERY | Admitting: COLON & RECTAL SURGERY
Payer: MEDICARE

## 2020-05-22 ENCOUNTER — ANESTHESIA (OUTPATIENT)
Dept: GASTROENTEROLOGY | Facility: HOSPITAL | Age: 70
End: 2020-05-22

## 2020-05-22 VITALS
TEMPERATURE: 97.6 F | HEART RATE: 67 BPM | DIASTOLIC BLOOD PRESSURE: 72 MMHG | WEIGHT: 170 LBS | BODY MASS INDEX: 25.76 KG/M2 | RESPIRATION RATE: 18 BRPM | HEIGHT: 68 IN | OXYGEN SATURATION: 98 % | SYSTOLIC BLOOD PRESSURE: 112 MMHG

## 2020-05-22 DIAGNOSIS — Z86.010 PERSONAL HISTORY OF COLONIC POLYPS: ICD-10-CM

## 2020-05-22 PROCEDURE — 45380 COLONOSCOPY AND BIOPSY: CPT | Performed by: COLON & RECTAL SURGERY

## 2020-05-22 PROCEDURE — 88305 TISSUE EXAM BY PATHOLOGIST: CPT | Performed by: PATHOLOGY

## 2020-05-22 RX ORDER — LIDOCAINE HYDROCHLORIDE 10 MG/ML
INJECTION, SOLUTION EPIDURAL; INFILTRATION; INTRACAUDAL; PERINEURAL AS NEEDED
Status: DISCONTINUED | OUTPATIENT
Start: 2020-05-22 | End: 2020-05-22 | Stop reason: SURG

## 2020-05-22 RX ORDER — PROPOFOL 10 MG/ML
INJECTION, EMULSION INTRAVENOUS AS NEEDED
Status: DISCONTINUED | OUTPATIENT
Start: 2020-05-22 | End: 2020-05-22 | Stop reason: SURG

## 2020-05-22 RX ORDER — PROPOFOL 10 MG/ML
INJECTION, EMULSION INTRAVENOUS CONTINUOUS PRN
Status: DISCONTINUED | OUTPATIENT
Start: 2020-05-22 | End: 2020-05-22 | Stop reason: SURG

## 2020-05-22 RX ORDER — SODIUM CHLORIDE 9 MG/ML
INJECTION, SOLUTION INTRAVENOUS CONTINUOUS PRN
Status: DISCONTINUED | OUTPATIENT
Start: 2020-05-22 | End: 2020-05-22 | Stop reason: SURG

## 2020-05-22 RX ADMIN — PROPOFOL 130 MCG/KG/MIN: 10 INJECTION, EMULSION INTRAVENOUS at 10:41

## 2020-05-22 RX ADMIN — PROPOFOL 80 MG: 10 INJECTION, EMULSION INTRAVENOUS at 10:40

## 2020-05-22 RX ADMIN — LIDOCAINE HYDROCHLORIDE 50 MG: 10 INJECTION, SOLUTION EPIDURAL; INFILTRATION; INTRACAUDAL; PERINEURAL at 10:40

## 2020-05-22 RX ADMIN — SODIUM CHLORIDE: 9 INJECTION, SOLUTION INTRAVENOUS at 10:33

## 2020-05-27 ENCOUNTER — TELEPHONE (OUTPATIENT)
Dept: OTHER | Facility: HOSPITAL | Age: 70
End: 2020-05-27

## 2020-10-23 DIAGNOSIS — E78.2 MIXED HYPERLIPIDEMIA: Primary | ICD-10-CM

## 2020-10-23 RX ORDER — EZETIMIBE 10 MG/1
10 TABLET ORAL DAILY
Qty: 90 TABLET | Refills: 3 | Status: SHIPPED | OUTPATIENT
Start: 2020-10-23 | End: 2020-12-28 | Stop reason: SDUPTHER

## 2020-11-06 DIAGNOSIS — E78.2 MIXED HYPERLIPIDEMIA: ICD-10-CM

## 2020-11-06 RX ORDER — FENOFIBRATE 160 MG/1
160 TABLET ORAL DAILY
Qty: 90 TABLET | Refills: 3 | Status: SHIPPED | OUTPATIENT
Start: 2020-11-06 | End: 2020-12-28 | Stop reason: SDUPTHER

## 2020-12-25 PROBLEM — R73.03 PREDIABETES: Status: ACTIVE | Noted: 2019-12-22

## 2020-12-28 ENCOUNTER — OFFICE VISIT (OUTPATIENT)
Dept: FAMILY MEDICINE CLINIC | Facility: CLINIC | Age: 70
End: 2020-12-28
Payer: MEDICARE

## 2020-12-28 VITALS
SYSTOLIC BLOOD PRESSURE: 142 MMHG | TEMPERATURE: 97.1 F | DIASTOLIC BLOOD PRESSURE: 84 MMHG | HEIGHT: 67 IN | HEART RATE: 76 BPM | OXYGEN SATURATION: 98 % | BODY MASS INDEX: 28.16 KG/M2 | WEIGHT: 179.4 LBS | RESPIRATION RATE: 16 BRPM

## 2020-12-28 DIAGNOSIS — R35.1 BPH ASSOCIATED WITH NOCTURIA: ICD-10-CM

## 2020-12-28 DIAGNOSIS — N40.1 BPH ASSOCIATED WITH NOCTURIA: ICD-10-CM

## 2020-12-28 DIAGNOSIS — N20.0 NEPHROLITHIASIS: ICD-10-CM

## 2020-12-28 DIAGNOSIS — Z00.00 MEDICARE ANNUAL WELLNESS VISIT, INITIAL: ICD-10-CM

## 2020-12-28 DIAGNOSIS — E78.2 MIXED HYPERLIPIDEMIA: Primary | ICD-10-CM

## 2020-12-28 DIAGNOSIS — K21.9 LARYNGOPHARYNGEAL REFLUX: ICD-10-CM

## 2020-12-28 DIAGNOSIS — R73.03 PREDIABETES: ICD-10-CM

## 2020-12-28 PROCEDURE — G0439 PPPS, SUBSEQ VISIT: HCPCS | Performed by: FAMILY MEDICINE

## 2020-12-28 PROCEDURE — 1123F ACP DISCUSS/DSCN MKR DOCD: CPT | Performed by: FAMILY MEDICINE

## 2020-12-28 PROCEDURE — 99213 OFFICE O/P EST LOW 20 MIN: CPT | Performed by: FAMILY MEDICINE

## 2020-12-28 RX ORDER — FENOFIBRATE 160 MG/1
160 TABLET ORAL DAILY
Qty: 90 TABLET | Refills: 3 | Status: SHIPPED | OUTPATIENT
Start: 2020-12-28 | End: 2021-01-06 | Stop reason: ALTCHOICE

## 2020-12-28 RX ORDER — EZETIMIBE 10 MG/1
10 TABLET ORAL DAILY
Qty: 90 TABLET | Refills: 3 | Status: SHIPPED | OUTPATIENT
Start: 2020-12-28 | End: 2021-10-28

## 2021-01-06 ENCOUNTER — TELEPHONE (OUTPATIENT)
Dept: FAMILY MEDICINE CLINIC | Facility: CLINIC | Age: 71
End: 2021-01-06

## 2021-01-06 DIAGNOSIS — E78.2 MIXED HYPERLIPIDEMIA: ICD-10-CM

## 2021-01-06 RX ORDER — FENOFIBRATE 160 MG/1
160 TABLET ORAL DAILY
Qty: 90 TABLET | Refills: 3 | Status: SHIPPED | OUTPATIENT
Start: 2021-01-06 | End: 2021-12-30 | Stop reason: SDUPTHER

## 2021-01-06 NOTE — PROGRESS NOTES
I spoke to a pharmacist with [facility], they want to let us know that patient's fenofibrate is being dispensed as [place] generic version, as the previous version we had been sending was [place], which they do not make as a generic  I pended the order to send as they requested, and it should save them from calling us every time they refill that medication

## 2021-09-14 ENCOUNTER — OFFICE VISIT (OUTPATIENT)
Dept: FAMILY MEDICINE CLINIC | Facility: CLINIC | Age: 71
End: 2021-09-14
Payer: MEDICARE

## 2021-09-14 VITALS
RESPIRATION RATE: 14 BRPM | OXYGEN SATURATION: 97 % | HEART RATE: 64 BPM | TEMPERATURE: 97.8 F | BODY MASS INDEX: 28.56 KG/M2 | WEIGHT: 182 LBS | DIASTOLIC BLOOD PRESSURE: 74 MMHG | SYSTOLIC BLOOD PRESSURE: 122 MMHG | HEIGHT: 67 IN

## 2021-09-14 DIAGNOSIS — L98.9 NON-HEALING SKIN LESION OF NOSE: ICD-10-CM

## 2021-09-14 DIAGNOSIS — R10.32 GROIN PAIN, LEFT: Primary | ICD-10-CM

## 2021-09-14 LAB
BACTERIA UR QL AUTO: ABNORMAL /HPF
BILIRUB UR QL STRIP: NEGATIVE
CLARITY UR: CLEAR
COLOR UR: YELLOW
GLUCOSE UR STRIP-MCNC: NEGATIVE MG/DL
HGB UR QL STRIP.AUTO: NEGATIVE
KETONES UR STRIP-MCNC: NEGATIVE MG/DL
LEUKOCYTE ESTERASE UR QL STRIP: ABNORMAL
MUCOUS THREADS UR QL AUTO: ABNORMAL
NITRITE UR QL STRIP: NEGATIVE
NON-SQ EPI CELLS URNS QL MICRO: ABNORMAL /HPF
PH UR STRIP.AUTO: 5.5 [PH]
PROT UR STRIP-MCNC: NEGATIVE MG/DL
RBC #/AREA URNS AUTO: ABNORMAL /HPF
SP GR UR STRIP.AUTO: 1.02 (ref 1–1.03)
UROBILINOGEN UR QL STRIP.AUTO: 0.2 E.U./DL
WBC #/AREA URNS AUTO: ABNORMAL /HPF

## 2021-09-14 PROCEDURE — 99213 OFFICE O/P EST LOW 20 MIN: CPT | Performed by: FAMILY MEDICINE

## 2021-09-14 PROCEDURE — 81001 URINALYSIS AUTO W/SCOPE: CPT | Performed by: FAMILY MEDICINE

## 2021-09-14 NOTE — PROGRESS NOTES
Chief Complaint   Patient presents with    Groin Pain     Possible hernia     Health Maintenance   Topic Date Due    COVID-19 Vaccine (1) Never done    DTaP,Tdap,and Td Vaccines (1 - Tdap) Never done    Pneumococcal Vaccine: 65+ Years (1 of 1 - PPSV23) Never done    Influenza Vaccine (1) 09/01/2021    BMI: Followup Plan  12/28/2021    Fall Risk  12/28/2021    Medicare Annual Wellness Visit (AWV)  12/28/2021    Depression Screening  09/14/2022    BMI: Adult  09/14/2022    Colorectal Cancer Screening  05/22/2023    Hepatitis C Screening  Completed    HIB Vaccine  Aged Out    Hepatitis B Vaccine  Aged Out    IPV Vaccine  Aged Out    Hepatitis A Vaccine  Aged Out    Meningococcal ACWY Vaccine  Aged Out    HPV Vaccine  Aged Out                Assessment/Plan:    Groin pain, left  Recommended patient to avoid heavy lifting, strenuous activities  Will check U/A  Schedule ultrasound of the abdominal wall to rule out hernia  Recommended evaluation by general surgeon  Non-healing skin lesion of nose  Referred to dermatologist for further evaluation  Diagnoses and all orders for this visit:    Groin pain, left  -     US abdominal wall; Future  -     Ambulatory referral to General Surgery; Future  -     Urinalysis with microscopic    Non-healing skin lesion of nose  -     Ambulatory referral to Dermatology; Future          Subjective:      Patient ID: Sony Taylor  is a 70 y o  male  HPI     Patient c/o intermittent pain in left groin area for the past month  Reports no diarrhea or constipation  Denies radiation of pain to his testicles  Patient has history of kidney stones  No blood in urine  No burning on urination  Patient reports no heavy lifting  He had history of right inguinal hernia repair in the past       Also c/o small red spot on the tip of his nose which bleeds periodically  Reports no prior history of skin cancer      The following portions of the patient's history were reviewed and updated as appropriate: allergies, current medications, past medical history, past social history, past surgical history and problem list     Review of Systems   Constitutional: Negative for activity change, appetite change and fever  Respiratory: Negative for cough, chest tightness and shortness of breath  Cardiovascular: Negative for chest pain, palpitations and leg swelling  Gastrointestinal: Negative for abdominal pain, blood in stool, constipation, diarrhea, nausea and vomiting  Genitourinary: Negative for difficulty urinating, dysuria, flank pain, frequency, hematuria and testicular pain  Left groin pain    Musculoskeletal: Negative for arthralgias, back pain, joint swelling and neck pain  Skin: Negative for rash  Neurological: Negative for dizziness and headaches  Hematological: Negative  Objective:      /74 (BP Location: Left arm, Patient Position: Sitting, Cuff Size: Adult)   Pulse 64   Temp 97 8 °F (36 6 °C) (Tympanic)   Resp 14   Ht 5' 6 5" (1 689 m)   Wt 82 6 kg (182 lb)   SpO2 97%   BMI 28 94 kg/m²          Physical Exam  Vitals and nursing note reviewed  Constitutional:       Appearance: Normal appearance  Cardiovascular:      Rate and Rhythm: Normal rate and regular rhythm  Heart sounds: No murmur heard  Pulmonary:      Effort: Pulmonary effort is normal       Breath sounds: Normal breath sounds  Abdominal:      General: Bowel sounds are normal  There is no distension  Palpations: Abdomen is soft  Tenderness: There is no right CVA tenderness or left CVA tenderness  Comments: Tenderness in left groin area  No palpable hernia  Musculoskeletal:         General: No swelling  Normal range of motion  Right lower leg: No edema  Left lower leg: No edema  Skin:     General: Skin is warm and dry  Findings: No rash        Comments: Erythematous non-healing tiny skin lesion on the tip of the nose left side   Neurological:      Mental Status: He is alert     Psychiatric:         Mood and Affect: Mood normal

## 2021-09-14 NOTE — ASSESSMENT & PLAN NOTE
Recommended patient to avoid heavy lifting, strenuous activities  Will check U/A  Schedule ultrasound of the abdominal wall to rule out hernia  Recommended evaluation by general surgeon

## 2021-09-17 ENCOUNTER — HOSPITAL ENCOUNTER (OUTPATIENT)
Dept: RADIOLOGY | Facility: HOSPITAL | Age: 71
Discharge: HOME | End: 2021-09-17
Payer: MEDICARE

## 2021-09-17 DIAGNOSIS — R10.32 GROIN PAIN, LEFT: ICD-10-CM

## 2021-09-17 PROCEDURE — 76705 ECHO EXAM OF ABDOMEN: CPT

## 2021-09-23 ENCOUNTER — CONSULT (OUTPATIENT)
Dept: DERMATOLOGY | Facility: CLINIC | Age: 71
End: 2021-09-23
Payer: MEDICARE

## 2021-09-23 VITALS — BODY MASS INDEX: 28.25 KG/M2 | WEIGHT: 180 LBS | HEIGHT: 67 IN | TEMPERATURE: 98.6 F

## 2021-09-23 DIAGNOSIS — Z78.9 NORMAL SKIN APPEARANCE: ICD-10-CM

## 2021-09-23 DIAGNOSIS — Z12.83 SCREENING FOR MALIGNANT NEOPLASM OF SKIN: Primary | ICD-10-CM

## 2021-09-23 PROCEDURE — 99202 OFFICE O/P NEW SF 15 MIN: CPT | Performed by: DERMATOLOGY

## 2021-09-23 RX ORDER — IMIQUIMOD 12.5 MG/.25G
CREAM TOPICAL
Qty: 24 EACH | Refills: 0 | Status: SHIPPED | OUTPATIENT
Start: 2021-09-23 | End: 2021-10-13 | Stop reason: ALTCHOICE

## 2021-09-23 NOTE — PATIENT INSTRUCTIONS
1  MELANOCYTIC NEVI ("Moles")    Assessment and Plan:  Based on a thorough discussion of this condition and the management approach to it (including a comprehensive discussion of the known risks, side effects and potential benefits of treatment), the patient (family) agrees to implement the following specific plan:   Reassured benign      Melanocytic Nevi  Melanocytic nevi ("moles") are caused by collections of the color producing skin cells, or melanocytes, in 1 area in the skin  They can range in color from pink to dark brown and be either raised or flat  Some moles are present at birth (I e , "congenital nevi"), while others come up later in life (i e , "acquired nevi")  Dameon Ermelinda exposure also stimulates the body to make more moles, ie the more sun you get the more moles you'll grow  Clinically distinguishing a healthy mole from melanoma may be difficult  The "ABCDE's" of moles have been suggested as a means of helping to alert a person to a suspicious mole and the possible increased risk of melanoma  Asymmetry: Healthy moles tend to be symmetric, while melanomas are often asymmetric  Asymmetry means if you draw a line through the mole, the two halves do not match in color, size, shape, or surface texture Any mole that starts to demonstrate "asymmetry" should be examined promptly by a board certified dermatologist      Border: Healthy moles tend to have discrete, even borders  The border of a melanoma often blends into the normal skin and does not sharply delineate the mole from normal skin  Any mole that starts to demonstrate "uneven borders" should be examined promptly     Color: Healthy moles tend to be one color throughout  Melanomas tend to be made up of different colors ranging from dark black, blue, white, or red    Any mole that demonstrates a color change should be examined promptly    Diameter: Healthy moles tend to be smaller than 0 6 cm in size; an exception are "congenital nevi" that can be larger  Melanomas tend to grow and can often be greater than 0 6 cm (1/4 of an inch, or the size of a pencil eraser)  This is only a guideline, and many normal moles may be larger than 0 6 cm without being unhealthy  Any mole that starts to change in size (small to bigger or bigger to smaller) should be examined promptly    Evolving: Healthy moles tend to "stay the same "  Melanomas may often show signs of change or evolution such as a change in size, shape, color, or elevation  Any mole that starts to itch, bleed, crust, burn, hurt, or ulcerate or demonstrate a change or evolution should be examined promptly by a board certified dermatologist       What are atypical moles or dysplastic nevi? Dysplastic moles are moles that have some of the ABCDE  changes listed above but  are not cancerous  Sometimes a biopsy and microscopic examination are needed to determine the difference  They may indicate an increased risk of melanoma in that person, especially if there is a family history of melanoma  What is a Melanoma? The main concern when looking at a new or changing mole it to evaluate whether it may be a melanoma  The appearance of a "new mole" remains one of the most reliable methods for identifying a malignant melanoma  A melanoma is a type of skin cancer that can be deadly if it spreads throughout the body  The prognosis of a Melanoma depends on how deep it has penetrated in the skin  If caught early, they generally will not have had time to grow into the deeper layers of the skin and they cure rate is then very high  Once the melanoma grows deeper into the skin, the cure rate drops dramatically  Therefore, early detection and removal of a malignant melanoma results in a much better chance of complete cure  Prescription for imiquimod has been sent into pharmacy: Please apply topically three times weekly for 4 weeks  Then do not apply for 4 weeks  Then repeat the cream for another 4 weeks

## 2021-09-23 NOTE — PROGRESS NOTES
[address] [facility] Note     Patient Name: Chastity Last  Encounter Date: 09/23/2021     Have you been cared for by a [facility] Dermatologist in the last 3 years and, if so, which one? No    · Have you traveled outside of the [address] [place] [address] in the past 3 months or outside of the [facility] area in the last 2 weeks? No     May we call your Preferred Phone number to discuss your specific medical information? Yes     May we leave a detailed message that includes your specific medical information? Yes      Today's Chief Concerns:   Concern #1:  Full Body Check    Concern #2:  Spot of concern on nose     Past Medical History:  Have you personally ever had or currently have any of the following? · Skin cancer (such as Melanoma, Basal Cell Carcinoma, Squamous Cell Carcinoma? (If Yes, please provide more detail)- No  · Eczema: No  · Psoriasis: No  · HIV/AIDS: No  · Hepatitis B or C: No  · Tuberculosis: No  · Systemic Immunosuppression such as Diabetes, Biologic or Immunotherapy, Chemotherapy, Organ Transplantation, Bone Marrow Transplantation (If YES, please provide more detail): No  · Radiation Treatment (If YES, please provide more detail): No  · Any other major medical conditions/concerns? (If Yes, which types)- No    Social History:     What is/was your primary occupation? Retired      What are your hobbies/past-times? TV     Family History:  Have any of your "first degree relatives" (parent, brother, sister, or child) had any of the following       · Skin cancer such as Melanoma or Merkel Cell Carcinoma or Pancreatic Cancer? No  · Eczema, Asthma, Hay Fever or Seasonal Allergies: No  · Psoriasis or Psoriatic Arthritis: No  · Do any other medical conditions seem to run in your family? If Yes, what condition and which relatives?   No    Current Medications:         Current Outpatient Medications:     ezetimibe (ZETIA) 10 mg tablet, Take 1 tablet (10 mg total) by mouth daily, Disp: 90 tablet, Rfl: 3    fenofibrate (Lofibra) 160 MG tablet, Take 1 tablet (160 mg total) by mouth daily, Disp: 90 tablet, Rfl: 3    Glucosamine-Chondroitin--400-375 MG TABS, Take by mouth, Disp: , Rfl:     ipratropium (ATROVENT) 0 06 % nasal spray, 2 sprays into each nostril 4 (four) times a day, Disp: 45 mL, Rfl: 3    multivitamin (THERAGRAN) TABS, Take by mouth, Disp: , Rfl:     omeprazole (PriLOSEC) 40 MG capsule, Take 1 capsule (40 mg total) by mouth every morning, Disp: 90 capsule, Rfl: 3    saccharomyces boulardii (FLORASTOR) 250 mg capsule, Take 250 mg by mouth 2 (two) times a day, Disp: , Rfl:     azelastine (ASTELIN) 0 1 % nasal spray, 1 spray into each nostril 2 (two) times a day Use in each nostril as directed (Patient not taking: Reported on 9/14/2021), Disp: 1 Bottle, Rfl: 5    Calcium Carb-Cholecalciferol 600-800 MG-UNIT TABS, Take by mouth, Disp: , Rfl:     dicyclomine (BENTYL) 20 mg tablet, Take 1 tablet (20 mg total) by mouth every 6 (six) hours (Patient not taking: Reported on 6/22/2020), Disp: 20 tablet, Rfl: 0    oxymetazoline (AFRIN) 0 05 % nasal spray, Instill 2 sprays in each nostril starting 30 mins before procedure and every 15 mins thereafter  (Patient not taking: Reported on 9/14/2021), Disp: 30 mL, Rfl: 0      Review of Systems:  Have you recently had or currently have any of the following? If YES, what are you doing for the problem? · Fever, chills or unintended weight loss: No  · Sudden loss or change in your vision: No  · Nausea, vomiting or blood in your stool: No  · Painful or swollen joints: No  · Wheezing or cough: No  · Changing mole or non-healing wound: YES,   · Nosebleeds: No  · Excessive sweating: No  · Easy or prolonged bleeding? No  · Over the last 2 weeks, how often have you been bothered by the following problems?   · Taking little interest or pleasure in doing things: 1 - Not at All  · Feeling down, depressed, or hopeless: 1 - Not at All  · Rapid heartbeat with epinephrine:  No    · FEMALES ONLY:    · Are you pregnant or planning to become pregnant? N/A  · Are you currently or planning to be nursing or breast feeding? No    · Any known allergies? Allergies   Allergen Reactions    Other      Environmental   ·       Physical Exam:     Was a chaperone (Derm Clinical Assistant) present throughout the entire Physical Exam? Yes     Did the Dermatology Team specifically  the patient on the importance of a Full Skin Exam to be sure that nothing is missed clinically?  Yes}  o Did the patient ultimately request or accept a Full Skin Exam?  Yes  o Did the patient specifically refuse to have the areas "under-the-bra" examined by the Dermatologist? No  o Did the patient specifically refuse to have the areas "under-the-underwear" examined by the Dermatologist? No    CONSTITUTIONAL:   Vitals:    09/23/21 1242   Temp: 98 6 °F (37 °C)   TempSrc: Tympanic   Weight: 81 6 kg (180 lb)   Height: 5' 6 5" (1 689 m)       PSYCH: Normal mood and affect  EYES: Normal conjunctiva  ENT: Normal lips and oral mucosa  CARDIOVASCULAR: No edema  RESPIRATORY: Normal respirations  HEME/LYMPH/IMMUNO:  No regional lymphadenopathy except as noted below in "ASSESSMENT AND PLAN BY DIAGNOSIS"    SKIN:  FULL ORGAN SYSTEM EXAM   Hair, Scalp, Ears, Face Normal except as noted below in Assessment   Neck, Cervical Chain Nodes Normal except as noted below in Assessment   Right Arm/Hand/Fingers Normal except as noted below in Assessment   Left Arm/Hand/Fingers Normal except as noted below in Assessment   Chest/Breasts/Axillae Viewed areas Normal except as noted below in Assessment   Abdomen, Umbilicus Normal except as noted below in Assessment   Back/Spine Normal except as noted below in Assessment   Buttocks Normal except as noted below in Assessment   Right Leg, Foot, Toes Normal except as noted below in Assessment   Left Leg, Foot, Toes Normal except as noted below in Assessment        Assessment and Plan by Diagnosis:    History of Present Condition:     Duration:  How long has this been an issue for you?    o  1 year    Location Affected:  Where on the body is this affecting you?    o  Nose    Quality:  Is there any bleeding, pain, itch, burning/irritation, or redness associated with the skin lesion?    o  Denies   Severity:  Describe any bleeding, pain, itch, burning/irritation, or redness on a scale of 1 to 10 (with 10 being the worst)  o  3   Timing:  Does this condition seem to be there pretty constantly or do you notice it more at specific times throughout the day?    o  Denies   Context:  Have you ever noticed that this condition seems to be associated with specific activities you do?    o  Denies   Modifying Factors:    o Anything that seems to make the condition worse?    -  Denies  o What have you tried to do to make the condition better? -  Denies   Associated Signs and Symptoms:  Does this skin lesion seem to be associated with any of the following:  o  DENIES    1  NORMAL SKIN APPEARANCE, NOSE:    No identifiable nasal-skin abnormality today  Biopsy discussed and offered  I could not identify a specific site of biopsy  Return with any adverse change  Empiric treatment with imiquimod regarding the possibility of occult actinic keratosis, evidence of which I could not identify today  He might be having manifestations of rosacea for all I know  Biopsy if anything ever becomes visible and persistent  Follow-up after imiquimod treatment and 2 weeks of post-imiquimod rest   I warned the patient and his wife that some people to sustain an irritant dermatitis in association with imiquimod therapy  Prescription for imiquimod has been sent into pharmacy: Please apply topically three times weekly for 4 weeks  Then do not apply for 4 weeks  Then repeat the cream for another 4 weeks  Other areas of integument were unremarkable    Scribe Attestation I,:  Marty Ba am acting as a scribe while in the presence of the attending physician :       I,:  Abimbola Villegas MD personally performed the services described in this documentation    as scribed in my presence :

## 2021-09-28 ENCOUNTER — CONSULT (OUTPATIENT)
Dept: SURGERY | Facility: CLINIC | Age: 71
End: 2021-09-28
Payer: MEDICARE

## 2021-09-28 VITALS — TEMPERATURE: 97.3 F | BODY MASS INDEX: 29.57 KG/M2 | HEIGHT: 66 IN | WEIGHT: 184 LBS

## 2021-09-28 DIAGNOSIS — K40.90 LEFT INGUINAL HERNIA: Primary | ICD-10-CM

## 2021-09-28 DIAGNOSIS — R10.32 GROIN PAIN, LEFT: ICD-10-CM

## 2021-09-28 PROCEDURE — 99203 OFFICE O/P NEW LOW 30 MIN: CPT | Performed by: SURGERY

## 2021-09-28 RX ORDER — HEPARIN SODIUM 5000 [USP'U]/ML
5000 INJECTION, SOLUTION INTRAVENOUS; SUBCUTANEOUS ONCE
Status: CANCELLED | OUTPATIENT
Start: 2021-09-28 | End: 2021-09-28

## 2021-09-28 NOTE — PROGRESS NOTES
Office Visit - General Surgery  Ish English  MRN: [id number]  Encounter: [id number]    Assessment and Plan    Problem List Items Addressed This Visit        Other    Groin pain, left    Left inguinal hernia - Primary      There is a left inguinal hernia present  I discussed with him what a hernia is and how it would be repaired  I discussed the procedure in detail including risks, benefits, alternatives, and what to expect postoperatively  He understands and is agreeable  Plan:  Left inguinal hernia repair               Chief Complaint:  Ish English  is a 70 y o  male who presents for Abdominal Pain (Consult left groin pain  U/S done )    Subjective  51-year-old male who has had problem with left groin pain for couple months  Cannot remember any inciting event  Feels like a tearing sensation when he gets bad  Usually starts off as a burning discomfort    No change in bowel or bladder habits    Past Medical History  Past Medical History:   Diagnosis Date    Allergic rhinitis     GERD (gastroesophageal reflux disease)     HL (hearing loss)     Wears hearing aids    Hyperlipidemia     Tinnitus        Past Surgical History  Past Surgical History:   Procedure Laterality Date    ADENOIDECTOMY      INGUINAL HERNIA REPAIR      TONSILLECTOMY      WISDOM TOOTH EXTRACTION         Family History  Family History   Problem Relation Age of Onset    Skin cancer Father        Social History  Social History     Socioeconomic History    Marital status: /Civil Union     Spouse name: None    Number of children: None    Years of education: None    Highest education level: None   Occupational History    None   Tobacco Use    Smoking status: Never Smoker    Smokeless tobacco: Never Used   Vaping Use    Vaping Use: Never used   Substance and Sexual Activity    Alcohol use: No    Drug use: No    Sexual activity: None   Other Topics Concern    None   Social History Narrative    None     Social Determinants of Health     Financial Resource Strain:     Difficulty of Paying Living Expenses:    Food Insecurity:     Worried About Running Out of Food in the Last Year:     [address] in the Last Year:    Transportation Needs:     Lack of Transportation (Medical):  Lack of Transportation (Non-Medical):    Physical Activity:     Days of Exercise per Week:     Minutes of Exercise per Session:    Stress:     Feeling of Stress :    Social Connections:     Frequency of Communication with Friends and Family:     Frequency of Social Gatherings with Friends and Family:     Attends Worship Services:     Active Member of Clubs or Organizations:     Attends Club or Organization Meetings:     Marital Status:    Intimate Partner Violence:     Fear of Current or Ex-Partner:     Emotionally Abused:     Physically Abused:     Sexually Abused:         Medications  Current Outpatient Medications on File Prior to Visit   Medication Sig Dispense Refill    azelastine (ASTELIN) 0 1 % nasal spray 1 spray into each nostril 2 (two) times a day Use in each nostril as directed 1 Bottle 5    Calcium Carb-Cholecalciferol 600-800 MG-UNIT TABS Take by mouth      Glucosamine-Chondroitin--400-375 MG TABS Take by mouth      imiquimod (ALDARA) 5 % cream Apply sparingly at bedtime to affected areas of nose 3 nights per week for 4 weeks, stop for 4 weeks, resume for 4 weeks   24 each 0    ipratropium (ATROVENT) 0 06 % nasal spray 2 sprays into each nostril 4 (four) times a day 45 mL 3    multivitamin (THERAGRAN) TABS Take by mouth      omeprazole (PriLOSEC) 40 MG capsule Take 1 capsule (40 mg total) by mouth every morning 90 capsule 3    saccharomyces boulardii (FLORASTOR) 250 mg capsule Take 250 mg by mouth 2 (two) times a day      dicyclomine (BENTYL) 20 mg tablet Take 1 tablet (20 mg total) by mouth every 6 (six) hours (Patient not taking: Reported on 6/22/2020) 20 tablet 0    ezetimibe (ZETIA) 10 mg tablet Take 1 tablet (10 mg total) by mouth daily 90 tablet 3    fenofibrate (Lofibra) 160 MG tablet Take 1 tablet (160 mg total) by mouth daily 90 tablet 3    oxymetazoline (AFRIN) 0 05 % nasal spray Instill 2 sprays in each nostril starting 30 mins before procedure and every 15 mins thereafter  (Patient not taking: Reported on 9/14/2021) 30 mL 0     No current facility-administered medications on file prior to visit  Allergies  Allergies   Allergen Reactions    Other      Environmental       Review of Systems   Constitutional: Negative for chills, fatigue and fever  HENT: Negative for congestion, ear pain, sneezing, trouble swallowing and voice change  Eyes: Negative for pain and discharge  Respiratory: Negative for cough, shortness of breath and wheezing  Cardiovascular: Negative for palpitations and leg swelling  Gastrointestinal: Negative for abdominal pain, constipation, diarrhea, nausea and vomiting  Endocrine: Negative for cold intolerance, heat intolerance and polyuria  Genitourinary: Negative for decreased urine volume, difficulty urinating and dysuria  Musculoskeletal: Negative for arthralgias and back pain  Skin: Negative for rash and wound  Allergic/Immunologic: Negative for environmental allergies and food allergies  Neurological: Negative for dizziness and weakness  Hematological: Negative for adenopathy  Does not bruise/bleed easily  Psychiatric/Behavioral: Negative for confusion and sleep disturbance  The patient is not nervous/anxious  All other systems reviewed and are negative  Objective  Vitals:    09/28/21 0801   Temp: (!) 97 3 °F (36 3 °C)       Physical Exam  Vitals and nursing note reviewed  Constitutional:       General: He is not in acute distress  Appearance: He is well-developed  He is not diaphoretic  HENT:      Head: Normocephalic and atraumatic        Right Ear: External ear normal       Left Ear: External ear normal  Eyes:      General: No scleral icterus  Conjunctiva/sclera: Conjunctivae normal    Neck:      Thyroid: No thyromegaly  Trachea: No tracheal deviation  Cardiovascular:      Rate and Rhythm: Normal rate and regular rhythm  Heart sounds: Normal heart sounds  No murmur heard  Pulmonary:      Effort: Pulmonary effort is normal  No respiratory distress  Breath sounds: Normal breath sounds  No wheezing or rales  Abdominal:      General: There is no distension  Palpations: Abdomen is soft  There is no mass  Tenderness: There is no abdominal tenderness  There is no guarding or rebound  Comments: Left groin hernia identified,  reducible   Musculoskeletal:         General: Normal range of motion  Cervical back: Normal range of motion and neck supple  Lymphadenopathy:      Cervical: No cervical adenopathy  Skin:     General: Skin is warm and dry  Neurological:      Mental Status: He is alert and oriented to person, place, and time  Psychiatric:         Behavior: Behavior normal          Thought Content:  Thought content normal          Judgment: Judgment normal

## 2021-10-04 ENCOUNTER — APPOINTMENT (OUTPATIENT)
Dept: LAB | Facility: HOSPITAL | Age: 71
End: 2021-10-04
Payer: MEDICARE

## 2021-10-04 ENCOUNTER — LAB REQUISITION (OUTPATIENT)
Dept: LAB | Facility: HOSPITAL | Age: 71
End: 2021-10-04
Payer: MEDICARE

## 2021-10-04 ENCOUNTER — OFFICE VISIT (OUTPATIENT)
Dept: LAB | Facility: HOSPITAL | Age: 71
End: 2021-10-04
Attending: SURGERY
Payer: MEDICARE

## 2021-10-04 DIAGNOSIS — K40.90 LEFT INGUINAL HERNIA: ICD-10-CM

## 2021-10-04 DIAGNOSIS — N20.0 NEPHROLITHIASIS: ICD-10-CM

## 2021-10-04 DIAGNOSIS — K40.90 UNILATERAL INGUINAL HERNIA, WITHOUT OBSTRUCTION OR GANGRENE, NOT SPECIFIED AS RECURRENT: ICD-10-CM

## 2021-10-04 DIAGNOSIS — R73.03 PREDIABETES: ICD-10-CM

## 2021-10-04 DIAGNOSIS — E78.2 MIXED HYPERLIPIDEMIA: ICD-10-CM

## 2021-10-04 LAB
ABO GROUP BLD: NORMAL
ALBUMIN SERPL BCP-MCNC: 3.9 G/DL (ref 3.5–5)
ALP SERPL-CCNC: 38 U/L (ref 46–116)
ALT SERPL W P-5'-P-CCNC: 42 U/L (ref 12–78)
ANION GAP SERPL CALCULATED.3IONS-SCNC: 1 MMOL/L (ref 4–13)
AST SERPL W P-5'-P-CCNC: 31 U/L (ref 5–45)
ATRIAL RATE: 52 BPM
BASOPHILS # BLD AUTO: 0.08 THOUSANDS/ΜL (ref 0–0.1)
BASOPHILS NFR BLD AUTO: 1 % (ref 0–1)
BILIRUB SERPL-MCNC: 0.3 MG/DL (ref 0.2–1)
BLD GP AB SCN SERPL QL: NEGATIVE
BUN SERPL-MCNC: 23 MG/DL (ref 5–25)
CALCIUM SERPL-MCNC: 9.6 MG/DL (ref 8.3–10.1)
CHLORIDE SERPL-SCNC: 108 MMOL/L (ref 100–108)
CHOLEST SERPL-MCNC: 160 MG/DL (ref 50–200)
CO2 SERPL-SCNC: 30 MMOL/L (ref 21–32)
CREAT SERPL-MCNC: 1.18 MG/DL (ref 0.6–1.3)
EOSINOPHIL # BLD AUTO: 0.17 THOUSAND/ΜL (ref 0–0.61)
EOSINOPHIL NFR BLD AUTO: 2 % (ref 0–6)
ERYTHROCYTE [DISTWIDTH] IN BLOOD BY AUTOMATED COUNT: 14.3 % (ref 11.6–15.1)
EST. AVERAGE GLUCOSE BLD GHB EST-MCNC: 126 MG/DL
GFR SERPL CREATININE-BSD FRML MDRD: 62 ML/MIN/1.73SQ M
GLUCOSE P FAST SERPL-MCNC: 98 MG/DL (ref 65–99)
HBA1C MFR BLD: 6 %
HCT VFR BLD AUTO: 42.8 % (ref 36.5–49.3)
HDLC SERPL-MCNC: 36 MG/DL
HGB BLD-MCNC: 13.9 G/DL (ref 12–17)
IMM GRANULOCYTES # BLD AUTO: 0.03 THOUSAND/UL (ref 0–0.2)
IMM GRANULOCYTES NFR BLD AUTO: 0 % (ref 0–2)
LDLC SERPL CALC-MCNC: 99 MG/DL (ref 0–100)
LYMPHOCYTES # BLD AUTO: 2.19 THOUSANDS/ΜL (ref 0.6–4.47)
LYMPHOCYTES NFR BLD AUTO: 23 % (ref 14–44)
MCH RBC QN AUTO: 29.4 PG (ref 26.8–34.3)
MCHC RBC AUTO-ENTMCNC: 32.5 G/DL (ref 31.4–37.4)
MCV RBC AUTO: 91 FL (ref 82–98)
MONOCYTES # BLD AUTO: 1.11 THOUSAND/ΜL (ref 0.17–1.22)
MONOCYTES NFR BLD AUTO: 12 % (ref 4–12)
NEUTROPHILS # BLD AUTO: 5.97 THOUSANDS/ΜL (ref 1.85–7.62)
NEUTS SEG NFR BLD AUTO: 62 % (ref 43–75)
NONHDLC SERPL-MCNC: 124 MG/DL
NRBC BLD AUTO-RTO: 0 /100 WBCS
P AXIS: 53 DEGREES
PLATELET # BLD AUTO: 247 THOUSANDS/UL (ref 149–390)
PMV BLD AUTO: 12.7 FL (ref 8.9–12.7)
POTASSIUM SERPL-SCNC: 4.2 MMOL/L (ref 3.5–5.3)
PR INTERVAL: 162 MS
PROT SERPL-MCNC: 7.7 G/DL (ref 6.4–8.2)
QRS AXIS: -2 DEGREES
QRSD INTERVAL: 96 MS
QT INTERVAL: 440 MS
QTC INTERVAL: 409 MS
RBC # BLD AUTO: 4.72 MILLION/UL (ref 3.88–5.62)
RH BLD: POSITIVE
SODIUM SERPL-SCNC: 139 MMOL/L (ref 136–145)
SPECIMEN EXPIRATION DATE: NORMAL
T WAVE AXIS: 17 DEGREES
TRIGL SERPL-MCNC: 126 MG/DL
VENTRICULAR RATE: 52 BPM
WBC # BLD AUTO: 9.55 THOUSAND/UL (ref 4.31–10.16)

## 2021-10-04 PROCEDURE — 86901 BLOOD TYPING SEROLOGIC RH(D): CPT | Performed by: SURGERY

## 2021-10-04 PROCEDURE — 86850 RBC ANTIBODY SCREEN: CPT | Performed by: SURGERY

## 2021-10-04 PROCEDURE — 93010 ELECTROCARDIOGRAM REPORT: CPT | Performed by: INTERNAL MEDICINE

## 2021-10-04 PROCEDURE — 86900 BLOOD TYPING SEROLOGIC ABO: CPT | Performed by: SURGERY

## 2021-10-04 PROCEDURE — 80053 COMPREHEN METABOLIC PANEL: CPT

## 2021-10-04 PROCEDURE — 83036 HEMOGLOBIN GLYCOSYLATED A1C: CPT

## 2021-10-04 PROCEDURE — 36415 COLL VENOUS BLD VENIPUNCTURE: CPT

## 2021-10-04 PROCEDURE — 93005 ELECTROCARDIOGRAM TRACING: CPT

## 2021-10-04 PROCEDURE — 80061 LIPID PANEL: CPT

## 2021-10-04 PROCEDURE — 85025 COMPLETE CBC W/AUTO DIFF WBC: CPT

## 2021-10-13 ENCOUNTER — ANESTHESIA EVENT (OUTPATIENT)
Dept: PERIOP | Facility: HOSPITAL | Age: 71
End: 2021-10-13
Payer: MEDICARE

## 2021-10-14 ENCOUNTER — HOSPITAL ENCOUNTER (OUTPATIENT)
Facility: HOSPITAL | Age: 71
Setting detail: OUTPATIENT SURGERY
Discharge: HOME | End: 2021-10-14
Attending: SURGERY | Admitting: SURGERY
Payer: MEDICARE

## 2021-10-14 ENCOUNTER — ANESTHESIA (OUTPATIENT)
Dept: PERIOP | Facility: HOSPITAL | Age: 71
End: 2021-10-14
Payer: MEDICARE

## 2021-10-14 VITALS
OXYGEN SATURATION: 98 % | SYSTOLIC BLOOD PRESSURE: 105 MMHG | BODY MASS INDEX: 28.12 KG/M2 | HEART RATE: 63 BPM | DIASTOLIC BLOOD PRESSURE: 61 MMHG | TEMPERATURE: 97.9 F | RESPIRATION RATE: 16 BRPM | WEIGHT: 175 LBS | HEIGHT: 66 IN

## 2021-10-14 DIAGNOSIS — K40.90 LEFT INGUINAL HERNIA: Primary | ICD-10-CM

## 2021-10-14 PROCEDURE — 49505 PRP I/HERN INIT REDUC >5 YR: CPT | Performed by: SURGERY

## 2021-10-14 PROCEDURE — G9197 ORDER FOR CEPH: HCPCS | Performed by: SURGERY

## 2021-10-14 PROCEDURE — C1781 MESH (IMPLANTABLE): HCPCS | Performed by: SURGERY

## 2021-10-14 DEVICE — BARD MESH PERFIX PLUG, EXTRA LARGE
Type: IMPLANTABLE DEVICE | Status: FUNCTIONAL
Brand: BARD MESH PERFIX PLUG

## 2021-10-14 RX ORDER — BUPIVACAINE HYDROCHLORIDE AND EPINEPHRINE 5; 5 MG/ML; UG/ML
INJECTION, SOLUTION PERINEURAL AS NEEDED
Status: DISCONTINUED | OUTPATIENT
Start: 2021-10-14 | End: 2021-10-14 | Stop reason: HOSPADM

## 2021-10-14 RX ORDER — SODIUM CHLORIDE, SODIUM LACTATE, POTASSIUM CHLORIDE, CALCIUM CHLORIDE 600; 310; 30; 20 MG/100ML; MG/100ML; MG/100ML; MG/100ML
125 INJECTION, SOLUTION INTRAVENOUS CONTINUOUS
Status: DISCONTINUED | OUTPATIENT
Start: 2021-10-14 | End: 2021-10-14 | Stop reason: HOSPADM

## 2021-10-14 RX ORDER — FENTANYL CITRATE 50 UG/ML
INJECTION, SOLUTION INTRAMUSCULAR; INTRAVENOUS AS NEEDED
Status: DISCONTINUED | OUTPATIENT
Start: 2021-10-14 | End: 2021-10-14

## 2021-10-14 RX ORDER — HEPARIN SODIUM 5000 [USP'U]/ML
5000 INJECTION, SOLUTION INTRAVENOUS; SUBCUTANEOUS ONCE
Status: COMPLETED | OUTPATIENT
Start: 2021-10-14 | End: 2021-10-14

## 2021-10-14 RX ORDER — CEFAZOLIN SODIUM 2 G/50ML
2000 SOLUTION INTRAVENOUS
Status: COMPLETED | OUTPATIENT
Start: 2021-10-14 | End: 2021-10-14

## 2021-10-14 RX ORDER — PROPOFOL 10 MG/ML
INJECTION, EMULSION INTRAVENOUS AS NEEDED
Status: DISCONTINUED | OUTPATIENT
Start: 2021-10-14 | End: 2021-10-14

## 2021-10-14 RX ORDER — METOCLOPRAMIDE HYDROCHLORIDE 5 MG/ML
INJECTION INTRAMUSCULAR; INTRAVENOUS AS NEEDED
Status: DISCONTINUED | OUTPATIENT
Start: 2021-10-14 | End: 2021-10-14

## 2021-10-14 RX ORDER — HYDROMORPHONE HCL/PF 1 MG/ML
0.2 SYRINGE (ML) INJECTION EVERY 4 HOURS PRN
Status: DISCONTINUED | OUTPATIENT
Start: 2021-10-14 | End: 2021-10-14 | Stop reason: HOSPADM

## 2021-10-14 RX ORDER — SODIUM CHLORIDE, SODIUM LACTATE, POTASSIUM CHLORIDE, CALCIUM CHLORIDE 600; 310; 30; 20 MG/100ML; MG/100ML; MG/100ML; MG/100ML
INJECTION, SOLUTION INTRAVENOUS CONTINUOUS PRN
Status: DISCONTINUED | OUTPATIENT
Start: 2021-10-14 | End: 2021-10-14

## 2021-10-14 RX ORDER — HYDROCODONE BITARTRATE AND ACETAMINOPHEN 5; 325 MG/1; MG/1
1 TABLET ORAL EVERY 4 HOURS PRN
Status: DISCONTINUED | OUTPATIENT
Start: 2021-10-14 | End: 2021-10-14 | Stop reason: HOSPADM

## 2021-10-14 RX ORDER — ONDANSETRON 2 MG/ML
4 INJECTION INTRAMUSCULAR; INTRAVENOUS ONCE
Status: DISCONTINUED | OUTPATIENT
Start: 2021-10-14 | End: 2021-10-14 | Stop reason: HOSPADM

## 2021-10-14 RX ORDER — LIDOCAINE HYDROCHLORIDE 10 MG/ML
INJECTION, SOLUTION EPIDURAL; INFILTRATION; INTRACAUDAL; PERINEURAL AS NEEDED
Status: DISCONTINUED | OUTPATIENT
Start: 2021-10-14 | End: 2021-10-14

## 2021-10-14 RX ORDER — ONDANSETRON 2 MG/ML
4 INJECTION INTRAMUSCULAR; INTRAVENOUS ONCE AS NEEDED
Status: DISCONTINUED | OUTPATIENT
Start: 2021-10-14 | End: 2021-10-14 | Stop reason: HOSPADM

## 2021-10-14 RX ORDER — FENTANYL CITRATE/PF 50 MCG/ML
25 SYRINGE (ML) INJECTION
Status: DISCONTINUED | OUTPATIENT
Start: 2021-10-14 | End: 2021-10-14 | Stop reason: HOSPADM

## 2021-10-14 RX ORDER — OXYCODONE HYDROCHLORIDE 5 MG/1
5 TABLET ORAL EVERY 4 HOURS PRN
Qty: 10 TABLET | Refills: 0 | Status: SHIPPED | OUTPATIENT
Start: 2021-10-14 | End: 2021-10-24

## 2021-10-14 RX ORDER — EPHEDRINE SULFATE 50 MG/ML
INJECTION INTRAVENOUS AS NEEDED
Status: DISCONTINUED | OUTPATIENT
Start: 2021-10-14 | End: 2021-10-14

## 2021-10-14 RX ORDER — ONDANSETRON 2 MG/ML
INJECTION INTRAMUSCULAR; INTRAVENOUS AS NEEDED
Status: DISCONTINUED | OUTPATIENT
Start: 2021-10-14 | End: 2021-10-14

## 2021-10-14 RX ADMIN — EPHEDRINE SULFATE 10 MG: 50 INJECTION, SOLUTION INTRAVENOUS at 10:19

## 2021-10-14 RX ADMIN — HEPARIN SODIUM 5000 UNITS: 5000 INJECTION INTRAVENOUS; SUBCUTANEOUS at 09:06

## 2021-10-14 RX ADMIN — PROPOFOL 250 MG: 10 INJECTION, EMULSION INTRAVENOUS at 09:56

## 2021-10-14 RX ADMIN — CEFAZOLIN SODIUM 2000 MG: 2 SOLUTION INTRAVENOUS at 09:51

## 2021-10-14 RX ADMIN — ONDANSETRON 4 MG: 2 INJECTION INTRAMUSCULAR; INTRAVENOUS at 10:10

## 2021-10-14 RX ADMIN — LIDOCAINE HYDROCHLORIDE 50 MG: 10 INJECTION, SOLUTION EPIDURAL; INFILTRATION; INTRACAUDAL; PERINEURAL at 09:56

## 2021-10-14 RX ADMIN — SODIUM CHLORIDE, SODIUM LACTATE, POTASSIUM CHLORIDE, AND CALCIUM CHLORIDE: .6; .31; .03; .02 INJECTION, SOLUTION INTRAVENOUS at 09:56

## 2021-10-14 RX ADMIN — SODIUM CHLORIDE, SODIUM LACTATE, POTASSIUM CHLORIDE, AND CALCIUM CHLORIDE 125 ML/HR: .6; .31; .03; .02 INJECTION, SOLUTION INTRAVENOUS at 07:55

## 2021-10-14 RX ADMIN — METOCLOPRAMIDE HYDROCHLORIDE 10 MG: 5 INJECTION INTRAMUSCULAR; INTRAVENOUS at 10:10

## 2021-10-14 RX ADMIN — FENTANYL CITRATE 50 MCG: 50 INJECTION INTRAMUSCULAR; INTRAVENOUS at 09:56

## 2021-10-26 ENCOUNTER — OFFICE VISIT (OUTPATIENT)
Dept: SURGERY | Facility: CLINIC | Age: 71
End: 2021-10-26

## 2021-10-26 VITALS — WEIGHT: 187 LBS | HEIGHT: 66 IN | TEMPERATURE: 97.7 F | BODY MASS INDEX: 30.05 KG/M2

## 2021-10-26 DIAGNOSIS — Z98.890 STATUS POST LEFT INGUINAL HERNIA REPAIR: Primary | ICD-10-CM

## 2021-10-26 DIAGNOSIS — Z87.19 STATUS POST LEFT INGUINAL HERNIA REPAIR: Primary | ICD-10-CM

## 2021-10-26 PROBLEM — K40.90 LEFT INGUINAL HERNIA: Status: RESOLVED | Noted: 2021-09-28 | Resolved: 2021-10-26

## 2021-10-26 PROCEDURE — 99024 POSTOP FOLLOW-UP VISIT: CPT | Performed by: SURGERY

## 2021-10-28 DIAGNOSIS — E78.2 MIXED HYPERLIPIDEMIA: ICD-10-CM

## 2021-10-28 RX ORDER — EZETIMIBE 10 MG/1
TABLET ORAL
Qty: 90 TABLET | Refills: 3 | Status: SHIPPED | OUTPATIENT
Start: 2021-10-28 | End: 2021-12-30 | Stop reason: SDUPTHER

## 2021-11-30 ENCOUNTER — OFFICE VISIT (OUTPATIENT)
Dept: SURGERY | Facility: CLINIC | Age: 71
End: 2021-11-30

## 2021-11-30 VITALS — TEMPERATURE: 96.3 F | WEIGHT: 183.5 LBS | BODY MASS INDEX: 29.62 KG/M2

## 2021-11-30 DIAGNOSIS — Z98.890 STATUS POST LEFT INGUINAL HERNIA REPAIR: Primary | ICD-10-CM

## 2021-11-30 DIAGNOSIS — Z87.19 STATUS POST LEFT INGUINAL HERNIA REPAIR: Primary | ICD-10-CM

## 2021-11-30 PROCEDURE — 99024 POSTOP FOLLOW-UP VISIT: CPT | Performed by: SURGERY

## 2021-12-30 ENCOUNTER — OFFICE VISIT (OUTPATIENT)
Dept: FAMILY MEDICINE CLINIC | Facility: CLINIC | Age: 71
End: 2021-12-30
Payer: MEDICARE

## 2021-12-30 VITALS
TEMPERATURE: 98.3 F | BODY MASS INDEX: 29.57 KG/M2 | DIASTOLIC BLOOD PRESSURE: 82 MMHG | WEIGHT: 184 LBS | HEIGHT: 66 IN | HEART RATE: 112 BPM | RESPIRATION RATE: 16 BRPM | SYSTOLIC BLOOD PRESSURE: 130 MMHG

## 2021-12-30 DIAGNOSIS — R73.03 PREDIABETES: ICD-10-CM

## 2021-12-30 DIAGNOSIS — R35.1 BPH ASSOCIATED WITH NOCTURIA: ICD-10-CM

## 2021-12-30 DIAGNOSIS — Z00.00 MEDICARE ANNUAL WELLNESS VISIT, SUBSEQUENT: ICD-10-CM

## 2021-12-30 DIAGNOSIS — N40.1 BPH ASSOCIATED WITH NOCTURIA: ICD-10-CM

## 2021-12-30 DIAGNOSIS — N20.0 NEPHROLITHIASIS: ICD-10-CM

## 2021-12-30 DIAGNOSIS — E78.2 MIXED HYPERLIPIDEMIA: Primary | ICD-10-CM

## 2021-12-30 DIAGNOSIS — K21.9 GASTROESOPHAGEAL REFLUX DISEASE, UNSPECIFIED WHETHER ESOPHAGITIS PRESENT: ICD-10-CM

## 2021-12-30 PROCEDURE — 99214 OFFICE O/P EST MOD 30 MIN: CPT | Performed by: FAMILY MEDICINE

## 2021-12-30 PROCEDURE — G0439 PPPS, SUBSEQ VISIT: HCPCS | Performed by: FAMILY MEDICINE

## 2021-12-30 RX ORDER — FENOFIBRATE 160 MG/1
160 TABLET ORAL DAILY
Qty: 90 TABLET | Refills: 3 | Status: SHIPPED | OUTPATIENT
Start: 2021-12-30 | End: 2022-06-28 | Stop reason: SDUPTHER

## 2021-12-30 RX ORDER — FAMOTIDINE 20 MG/1
20 TABLET, FILM COATED ORAL 2 TIMES DAILY
Qty: 60 TABLET | Refills: 5 | Status: SHIPPED | OUTPATIENT
Start: 2021-12-30 | End: 2022-06-17

## 2021-12-30 RX ORDER — EZETIMIBE 10 MG/1
10 TABLET ORAL DAILY
Qty: 90 TABLET | Refills: 3 | Status: SHIPPED | OUTPATIENT
Start: 2021-12-30 | End: 2022-06-28 | Stop reason: SDUPTHER

## 2022-06-17 DIAGNOSIS — K21.9 GASTROESOPHAGEAL REFLUX DISEASE, UNSPECIFIED WHETHER ESOPHAGITIS PRESENT: ICD-10-CM

## 2022-06-17 RX ORDER — FAMOTIDINE 20 MG/1
TABLET, FILM COATED ORAL
Qty: 60 TABLET | Refills: 5 | Status: SHIPPED | OUTPATIENT
Start: 2022-06-17 | End: 2022-06-28 | Stop reason: SDUPTHER

## 2022-06-26 PROBLEM — Z01.810 PREOP CARDIOVASCULAR EXAM: Status: ACTIVE | Noted: 2019-12-22

## 2022-06-28 ENCOUNTER — OFFICE VISIT (OUTPATIENT)
Dept: FAMILY MEDICINE CLINIC | Facility: CLINIC | Age: 72
End: 2022-06-28
Payer: MEDICARE

## 2022-06-28 ENCOUNTER — TELEPHONE (OUTPATIENT)
Dept: FAMILY MEDICINE CLINIC | Facility: CLINIC | Age: 72
End: 2022-06-28

## 2022-06-28 VITALS
SYSTOLIC BLOOD PRESSURE: 118 MMHG | BODY MASS INDEX: 28.51 KG/M2 | HEIGHT: 66 IN | RESPIRATION RATE: 16 BRPM | WEIGHT: 177.4 LBS | TEMPERATURE: 97.2 F | OXYGEN SATURATION: 98 % | DIASTOLIC BLOOD PRESSURE: 68 MMHG | HEART RATE: 69 BPM

## 2022-06-28 DIAGNOSIS — H25.9 SENILE CATARACT OF RIGHT EYE, UNSPECIFIED AGE-RELATED CATARACT TYPE: ICD-10-CM

## 2022-06-28 DIAGNOSIS — E78.2 MIXED HYPERLIPIDEMIA: ICD-10-CM

## 2022-06-28 DIAGNOSIS — R73.03 PREDIABETES: ICD-10-CM

## 2022-06-28 DIAGNOSIS — H25.9 SENILE CATARACT OF LEFT EYE, UNSPECIFIED AGE-RELATED CATARACT TYPE: ICD-10-CM

## 2022-06-28 DIAGNOSIS — Z01.810 PREOP CARDIOVASCULAR EXAM: Primary | ICD-10-CM

## 2022-06-28 DIAGNOSIS — K21.9 GASTROESOPHAGEAL REFLUX DISEASE, UNSPECIFIED WHETHER ESOPHAGITIS PRESENT: ICD-10-CM

## 2022-06-28 PROBLEM — H26.40 AFTER CATARACT, LEFT EYE: Status: ACTIVE | Noted: 2022-06-28

## 2022-06-28 PROCEDURE — 99214 OFFICE O/P EST MOD 30 MIN: CPT | Performed by: FAMILY MEDICINE

## 2022-06-28 RX ORDER — FENOFIBRATE 160 MG/1
160 TABLET ORAL DAILY
Qty: 90 TABLET | Refills: 3 | Status: SHIPPED | OUTPATIENT
Start: 2022-06-28 | End: 2022-06-28 | Stop reason: SDUPTHER

## 2022-06-28 RX ORDER — FAMOTIDINE 20 MG/1
20 TABLET, FILM COATED ORAL 2 TIMES DAILY
Qty: 180 TABLET | Refills: 3 | Status: SHIPPED | OUTPATIENT
Start: 2022-06-28 | End: 2022-06-29 | Stop reason: SDUPTHER

## 2022-06-28 RX ORDER — FENOFIBRATE 160 MG/1
160 TABLET ORAL DAILY
Qty: 90 TABLET | Refills: 3 | Status: SHIPPED | OUTPATIENT
Start: 2022-06-28 | End: 2022-06-29 | Stop reason: SDUPTHER

## 2022-06-28 RX ORDER — EZETIMIBE 10 MG/1
10 TABLET ORAL DAILY
Qty: 90 TABLET | Refills: 3 | Status: SHIPPED | OUTPATIENT
Start: 2022-06-28 | End: 2022-06-29 | Stop reason: SDUPTHER

## 2022-06-28 RX ORDER — EZETIMIBE 10 MG/1
10 TABLET ORAL DAILY
Qty: 90 TABLET | Refills: 3 | Status: SHIPPED | OUTPATIENT
Start: 2022-06-28 | End: 2022-06-28 | Stop reason: SDUPTHER

## 2022-06-28 NOTE — ASSESSMENT & PLAN NOTE
Cardiopulmonary status is stable  Patient is medically stable to proceed with left eye cataract surgery as scheduled on July 5, 2022 and right eye cataract surgery on July 19, 2022  Recommended to avoid aspirin NSAID's 1 week prior to surgery

## 2022-06-28 NOTE — TELEPHONE ENCOUNTER
Patient seen today, scripts sent to wrong pharmacy  Updated info in chart  Send to [facility] order   Zetia, fenofibrate and famotidine

## 2022-06-28 NOTE — ASSESSMENT & PLAN NOTE
Continue Fenofibrate 160 mg daily, Zetia 10 mg daily  Follow a low-cholesterol diet, regular exercise

## 2022-06-28 NOTE — PROGRESS NOTES
Chief Complaint   Patient presents with    Pre-op Exam     Cataract surgery         Health Maintenance   Topic Date Due    COVID-19 Vaccine (1) Never done    DTaP,Tdap,and Td Vaccines (1 - Tdap) Never done    Pneumococcal Vaccine: 65+ Years (1 - PCV) Never done    BMI: Followup Plan  12/28/2021    Influenza Vaccine (Season Ended) 09/01/2022    Depression Screening  09/14/2022    Fall Risk  12/30/2022    Medicare Annual Wellness Visit (AWV)  12/30/2022    Colorectal Cancer Screening  05/22/2023    BMI: Adult  06/28/2023    Hepatitis C Screening  Completed    HIB Vaccine  Aged Out    Hepatitis B Vaccine  Aged Out    IPV Vaccine  Aged Out    Hepatitis A Vaccine  Aged Out    Meningococcal ACWY Vaccine  Aged Out    HPV Vaccine  Aged Out     BMI Counseling: Body mass index is 28 63 kg/m²  The BMI is above normal  Nutrition recommendations include decreasing portion sizes, encouraging healthy choices of fruits and vegetables, decreasing fast food intake, consuming healthier snacks, limiting drinks that contain sugar, moderation in carbohydrate intake, increasing intake of lean protein, reducing intake of saturated and trans fat and reducing intake of cholesterol  Exercise recommendations include exercising 3-5 times per week  No pharmacotherapy was ordered  Rationale for BMI follow-up plan is due to patient being overweight or obese  Assessment/Plan:    Preop cardiovascular exam  Cardiopulmonary status is stable  Patient is medically stable to proceed with left eye cataract surgery as scheduled on July 5, 2022 and right eye cataract surgery on July 19, 2022  Recommended to avoid aspirin NSAID's 1 week prior to surgery  Mixed hyperlipidemia  Continue Fenofibrate 160 mg daily, Zetia 10 mg daily  Follow a low-cholesterol diet, regular exercise  Prediabetes  Recommended to avoid starchy foods, concentrated sweets, work on weight reduction      GERD (gastroesophageal reflux disease)  Symptoms stable  Continue Famotidine 20 mg 1 tablet twice daily before meals  Diagnoses and all orders for this visit:    Preop cardiovascular exam    Mixed hyperlipidemia  -     Discontinue: fenofibrate (Lofibra) 160 MG tablet; Take 1 tablet (160 mg total) by mouth daily  -     Discontinue: ezetimibe (ZETIA) 10 mg tablet; Take 1 tablet (10 mg total) by mouth daily  -     ezetimibe (ZETIA) 10 mg tablet; Take 1 tablet (10 mg total) by mouth daily  -     fenofibrate (Lofibra) 160 MG tablet; Take 1 tablet (160 mg total) by mouth daily    Prediabetes    Gastroesophageal reflux disease, unspecified whether esophagitis present  -     famotidine (PEPCID) 20 mg tablet; Take 1 tablet (20 mg total) by mouth 2 (two) times a day    Senile cataract of left eye, unspecified age-related cataract type    Senile cataract of right eye, unspecified age-related cataract type          Subjective:      Patient ID: Perry Rosa  is a 70 y o  male  HPI     Patient presents for pre-op evaluation prior to left eye cataract surgery scheduled on July 5, 2022 and  right eye cataract surgery scheduled on July 19, 2022 with ophthalmologist Dr Kristen Lockwood  C/o decreased vision in both eyes  Reviewed current medications, last blood test results from October 2021  No prior cardiac history  Patient reports no allergy to anesthesia drugs  No easy bruising or bleeding  PMHx: Hyperlipidemia, Prediabetes, GERD, LPR, chronic rhinitis, kidney stones, BPH  Denies chest pain, shortness of breath, dizziness  Hyperlipidemia- currently taking Fenofibrate 160 mg daily, Zetia 10 mg daily  GERD/ LPR - symptoms are stable  Patient takes Pepcid 20 mg twice daily before meals  Denies tobacco, alcohol use  Refusing immunizations including COVID vaccination      The following portions of the patient's history were reviewed and updated as appropriate: current medications, past family history, past medical history, past social history, past surgical history and problem list     Review of Systems   Constitutional: Negative for activity change, appetite change, chills, fatigue and fever  HENT: Negative for congestion, ear pain, hearing loss, nosebleeds, sore throat and trouble swallowing  Eyes: Positive for visual disturbance (decreased vision in both eyes)  Negative for pain, redness and itching  Wears glasses   Respiratory: Negative for cough, chest tightness, shortness of breath and wheezing  Cardiovascular: Negative for chest pain, palpitations and leg swelling  Gastrointestinal: Negative for abdominal pain, constipation, diarrhea, nausea and vomiting  Genitourinary: Negative for dysuria, flank pain and hematuria  Nocturia x 1   Musculoskeletal: Negative for arthralgias, back pain and joint swelling  Skin: Negative for rash  Neurological: Negative for dizziness, syncope and headaches  Hematological: Negative  Psychiatric/Behavioral: Negative for dysphoric mood and sleep disturbance  The patient is not nervous/anxious  Objective:      /68 (BP Location: Left arm, Patient Position: Sitting)   Pulse 69   Temp (!) 97 2 °F (36 2 °C) (Tympanic)   Resp 16   Ht 5' 6" (1 676 m)   Wt 80 5 kg (177 lb 6 4 oz)   SpO2 98%   BMI 28 63 kg/m²          Physical Exam  Vitals and nursing note reviewed  Constitutional:       Appearance: Normal appearance  HENT:      Head: Normocephalic and atraumatic  Right Ear: Tympanic membrane normal       Left Ear: Tympanic membrane normal    Eyes:      Conjunctiva/sclera: Conjunctivae normal       Pupils: Pupils are equal, round, and reactive to light  Neck:      Vascular: No carotid bruit  Cardiovascular:      Rate and Rhythm: Normal rate and regular rhythm  Heart sounds: No murmur heard  Pulmonary:      Effort: Pulmonary effort is normal       Breath sounds: Normal breath sounds     Abdominal:      General: Bowel sounds are normal  There is no distension  Palpations: Abdomen is soft  Tenderness: There is no abdominal tenderness  Musculoskeletal:         General: No swelling, tenderness or deformity  Normal range of motion  Cervical back: Normal range of motion and neck supple  Right lower leg: No edema  Left lower leg: No edema  Skin:     General: Skin is warm and dry  Findings: No rash  Neurological:      General: No focal deficit present  Mental Status: He is alert  Motor: No weakness        Gait: Gait normal    Psychiatric:         Mood and Affect: Mood normal

## 2022-06-29 RX ORDER — FAMOTIDINE 20 MG/1
20 TABLET, FILM COATED ORAL 2 TIMES DAILY
Qty: 180 TABLET | Refills: 3 | Status: SHIPPED | OUTPATIENT
Start: 2022-06-29

## 2022-06-29 RX ORDER — FENOFIBRATE 160 MG/1
160 TABLET ORAL DAILY
Qty: 90 TABLET | Refills: 3 | Status: SHIPPED | OUTPATIENT
Start: 2022-06-29 | End: 2022-09-27

## 2022-06-29 RX ORDER — EZETIMIBE 10 MG/1
10 TABLET ORAL DAILY
Qty: 90 TABLET | Refills: 3 | Status: SHIPPED | OUTPATIENT
Start: 2022-06-29

## 2022-12-05 ENCOUNTER — OFFICE VISIT (OUTPATIENT)
Dept: FAMILY MEDICINE CLINIC | Facility: CLINIC | Age: 72
End: 2022-12-05

## 2022-12-05 ENCOUNTER — NURSE TRIAGE (OUTPATIENT)
Dept: OTHER | Facility: OTHER | Age: 72
End: 2022-12-05

## 2022-12-05 VITALS
TEMPERATURE: 97.2 F | BODY MASS INDEX: 28.61 KG/M2 | HEIGHT: 66 IN | DIASTOLIC BLOOD PRESSURE: 78 MMHG | RESPIRATION RATE: 16 BRPM | SYSTOLIC BLOOD PRESSURE: 110 MMHG | HEART RATE: 80 BPM | WEIGHT: 178 LBS

## 2022-12-05 DIAGNOSIS — J40 BRONCHITIS: Primary | ICD-10-CM

## 2022-12-05 RX ORDER — AZITHROMYCIN 250 MG/1
TABLET, FILM COATED ORAL
Qty: 6 TABLET | Refills: 0 | Status: SHIPPED | OUTPATIENT
Start: 2022-12-05 | End: 2022-12-10

## 2022-12-05 RX ORDER — PREDNISONE 10 MG/1
TABLET ORAL
Qty: 30 TABLET | Refills: 0 | Status: SHIPPED | OUTPATIENT
Start: 2022-12-05

## 2022-12-05 RX ORDER — DEXTROMETHORPHAN HYDROBROMIDE AND PROMETHAZINE HYDROCHLORIDE 15; 6.25 MG/5ML; MG/5ML
5 SOLUTION ORAL 4 TIMES DAILY PRN
Qty: 118 ML | Refills: 0 | Status: SHIPPED | OUTPATIENT
Start: 2022-12-05

## 2022-12-05 NOTE — PATIENT INSTRUCTIONS
Acute Bronchitis   AMBULATORY CARE:   Acute bronchitis  is swelling and irritation in your lungs  It is usually caused by a virus and most often happens in the winter  Bronchitis may also be caused by bacteria or by a chemical irritant, such as smoke  Common symptoms include the following:   Cough that lasts up to 3 weeks, stuffy nose    Hoarseness, sore throat    A fever and chills    Feeling more tired than usual, and body aches    Wheezing or pain when you breathe or cough    Seek care immediately if:   You cough up blood  Your lips or fingernails turn blue  You feel like you are not getting enough air when you breathe  Call your doctor if:   Your symptoms do not go away or get worse, even after treatment  Your cough does not get better within 4 weeks  You have questions or concerns about your condition or care  Medicines: You may  need any of the following:  Cough suppressants  decrease your urge to cough  Decongestants  help loosen mucus in your lungs and make it easier to cough up  This can help you breathe easier  Inhalers  may be given  Your healthcare provider may give you one or more inhalers to help you breathe easier and cough less  An inhaler gives your medicine to open your airways  Ask your healthcare provider to show you how to use your inhaler correctly  Antibiotics  may be given for up to 5 days if your bronchitis is caused by bacteria  Acetaminophen  decreases pain and fever  It is available without a doctor's order  Ask how much to take and how often to take it  Follow directions  Read the labels of all other medicines you are using to see if they also contain acetaminophen, or ask your doctor or pharmacist  Acetaminophen can cause liver damage if not taken correctly  Do not use more than 4 grams (4,000 milligrams) total of acetaminophen in one day  NSAIDs  help decrease swelling and pain or fever   This medicine is available with or without a doctor's order  NSAIDs can cause stomach bleeding or kidney problems in certain people  If you take blood thinner medicine, always ask your healthcare provider if NSAIDs are safe for you  Always read the medicine label and follow directions  Take your medicine as directed  Contact your healthcare provider if you think your medicine is not helping or if you have side effects  Tell him of her if you are allergic to any medicine  Keep a list of the medicines, vitamins, and herbs you take  Include the amounts, and when and why you take them  Bring the list or the pill bottles to follow-up visits  Carry your medicine list with you in case of an emergency  Self-care:   Drink liquids as directed  You may need to drink more liquids than usual to stay hydrated  Ask how much liquid to drink each day and which liquids are best for you  Use a cool mist humidifier  to increase air moisture in your home  This may make it easier for you to breathe and help decrease your cough  Get more rest   Rest helps your body to heal  Slowly start to do more each day  Rest when you feel it is needed  Avoid irritants in the air  Avoid chemicals, fumes, and dust  Wear a face mask if you must work around dust or fumes  Stay inside on days when air pollution levels are high  If you have allergies, stay inside when pollen counts are high  Do not use aerosol products, such as spray-on deodorant, bug spray, and hair spray  Do not smoke or be around others who are smoking  Nicotine and other chemicals in cigarettes and cigars can cause lung damage  Ask your healthcare provider for information if you currently smoke and need help to quit  E-cigarettes or smokeless tobacco still contain nicotine  Talk to your healthcare provider before you use these products  Prevent acute bronchitis:       Ask about vaccines you may need  Get a flu vaccine each year as soon as recommended, usually in September or October   Ask your healthcare provider if you should also get a pneumonia or COVID-19 vaccine  Your healthcare provider can tell you if you should also get other vaccines, and when to get them  Prevent the spread of germs  You can decrease your risk for acute bronchitis and other illnesses by doing the following:    Wash your hands often with soap and water  Carry germ-killing hand lotion or gel with you  You can use the lotion or gel to clean your hands when soap and water are not available  Do not touch your eyes, nose, or mouth unless you have washed your hands first     Always cover your mouth when you cough to prevent the spread of germs  It is best to cough into a tissue or your shirt sleeve instead of into your hand  Ask those around you to cover their mouths when they cough  Try to avoid people who have a cold or the flu  If you are sick, stay away from others as much as possible  Follow up with your doctor as directed:  Write down questions you have so you will remember to ask them during your follow-up visits  © Copyright [employer] 2022 Information is for End User's use only and may not be sold, redistributed or otherwise used for commercial purposes  All illustrations and images included in CareNotes® are the copyrighted property of A D A M [employer] Inc  or Ehsan Basurto   The above information is an  only  It is not intended as medical advice for individual conditions or treatments  Talk to your doctor, nurse or pharmacist before following any medical regimen to see if it is safe and effective for you

## 2022-12-05 NOTE — ASSESSMENT & PLAN NOTE
Patient with a 2 week history of nasal congestion, cough, postnasal drip and rhinorrhea  A COVID test performed last week was negative  Exam the patient does have some expiratory wheezing  He has been taking NyQuil with some relief of his symptoms  Prednisone taper sent to the patient's pharmacy  Zithromax sent to the patient's pharmacy  Phenergan DM was sent as well for the patient to take at bedtime  Side effects of medication discussed

## 2022-12-05 NOTE — PROGRESS NOTES
[facility]    NAME: Haydee Pack  AGE: 67 y o  SEX: male  : 1950     DATE: 2022     Assessment and Plan:     Problem List Items Addressed This Visit        Respiratory    Bronchitis - Primary     Patient with a 2 week history of nasal congestion, cough, postnasal drip and rhinorrhea  A COVID test performed last week was negative  Exam the patient does have some expiratory wheezing  He has been taking NyQuil with some relief of his symptoms  Prednisone taper sent to the patient's pharmacy  Zithromax sent to the patient's pharmacy  Phenergan DM was sent as well for the patient to take at bedtime  Side effects of medication discussed  Relevant Medications    azithromycin (Zithromax) 250 mg tablet    Promethazine-DM (PHENERGAN-DM) 6 25-15 mg/5 mL oral syrup    predniSONE 10 mg tablet           No follow-ups on file  Chief Complaint:     Chief Complaint   Patient presents with   • Cough        History of Present Illness:     Symptoms started about one week ago  Cough, sinus drainage, clear to yellow  Post nasal drip  Cough at night worse  Taking nyqil with some relief of symptoms  Covid negative last week  Will treat for bronchitis     Review of Systems:     Review of Systems   Constitutional: Negative  Negative for fatigue  HENT: Positive for congestion, postnasal drip and rhinorrhea  Negative for trouble swallowing  Eyes: Negative  Negative for visual disturbance  Respiratory: Positive for cough and wheezing  Negative for choking and shortness of breath  Cardiovascular: Negative  Negative for chest pain  Gastrointestinal: Negative  Endocrine: Negative  Genitourinary: Negative  Musculoskeletal: Negative  Negative for arthralgias, back pain, myalgias and neck pain  Skin: Negative  Neurological: Negative for dizziness and headaches  Psychiatric/Behavioral: Negative           Problem List: Patient Active Problem List   Diagnosis   • Mixed hyperlipidemia   • Prediabetes   • Nephrolithiasis   • BPH associated with nocturia   • Preop cardiovascular exam   • Elevated PSA   • GERD (gastroesophageal reflux disease)   • Groin pain, left   • Non-healing skin lesion of nose   • Status post left inguinal hernia repair   • Age-related cataract of left eye   • Age-related cataract of right eye   • Bronchitis        Objective:     /78   Pulse 80   Temp (!) 97 2 °F (36 2 °C) (Tympanic)   Resp 16   Ht 5' 6" (1 676 m)   Wt 80 7 kg (178 lb)   BMI 28 73 kg/m²     Current Outpatient Medications   Medication Sig Dispense Refill   • azithromycin (Zithromax) 250 mg tablet Take 2 tablets (500 mg total) by mouth daily for 1 day, THEN 1 tablet (250 mg total) daily for 4 days  6 tablet 0   • Calcium Carb-Cholecalciferol 600-800 MG-UNIT TABS Take by mouth daily      • ezetimibe (ZETIA) 10 mg tablet Take 1 tablet (10 mg total) by mouth daily 90 tablet 3   • famotidine (PEPCID) 20 mg tablet Take 1 tablet (20 mg total) by mouth 2 (two) times a day 180 tablet 3   • fenofibrate (Lofibra) 160 MG tablet Take 1 tablet (160 mg total) by mouth daily 90 tablet 3   • Glucosamine-Chondroitin--400-375 MG TABS Take by mouth daily      • ipratropium (ATROVENT) 0 06 % nasal spray 2 sprays into each nostril 4 (four) times a day (Patient taking differently: 2 sprays into each nostril 2 (two) times a day) 45 mL 3   • multivitamin (THERAGRAN) TABS Take by mouth     • predniSONE 10 mg tablet 40 mg for three days, 30 mg for three days, 20 mg for 3 days, 10 mg for 3 days  30 tablet 0   • Promethazine-DM (PHENERGAN-DM) 6 25-15 mg/5 mL oral syrup Take 5 mL by mouth 4 (four) times a day as needed for cough 118 mL 0     No current facility-administered medications for this visit  Physical Exam  Vitals reviewed  Constitutional:       Appearance: Normal appearance  He is obese  HENT:      Head: Normocephalic and atraumatic  Right Ear: Tympanic membrane and ear canal normal       Left Ear: Tympanic membrane, ear canal and external ear normal       Nose: Congestion and rhinorrhea present  Mouth/Throat:      Mouth: Mucous membranes are moist    Eyes:      Extraocular Movements: Extraocular movements intact  Pupils: Pupils are equal, round, and reactive to light  Cardiovascular:      Rate and Rhythm: Normal rate and regular rhythm  Pulses: Normal pulses  Heart sounds: Normal heart sounds  Pulmonary:      Effort: Pulmonary effort is normal       Breath sounds: Wheezing present  Musculoskeletal:         General: Normal range of motion  Skin:     General: Skin is warm  Neurological:      General: No focal deficit present  Mental Status: He is alert and oriented to person, place, and time  Psychiatric:         Mood and Affect: Mood normal          Behavior: Behavior normal          Thought Content:  Thought content normal          Judgment: Judgment normal          [place], [address] [facility]

## 2022-12-05 NOTE — TELEPHONE ENCOUNTER
Reason for Disposition  • Patient wants to be seen    Answer Assessment - Initial Assessment Questions  1  ONSET: "When did the cough begin?"       10days ago   2  SEVERITY: "How bad is the cough today?"       Productive/moderate  3  SPUTUM: "Describe the color of your sputum" (none, dry cough; clear, white, yellow, green)      Clear to yellow    5  DIFFICULTY BREATHING: "Are you having difficulty breathing?" If Yes, ask: "How bad is it?" (e g , mild, moderate, severe)     - MILD: No SOB at rest, mild SOB with walking, speaks normally in sentences, can lay down, no retractions, pulse < 100      - MODERATE: SOB at rest, SOB with minimal exertion and prefers to sit, cannot lie down flat, speaks in phrases, mild retractions, audible wheezing, pulse 100-120      - SEVERE: Very SOB at rest, speaks in single words, struggling to breathe, sitting hunched forward, retractions, pulse > 120       fine  6  FEVER: "Do you have a fever?" If Yes, ask: "What is your temperature, how was it measured, and when did it start?"      no    10   OTHER SYMPTOMS: "Do you have any other symptoms?" (e g , runny nose, wheezing, chest pain)        Sinus congestion       At home covid test negative    Protocols used: COUGH-ADULT-OH

## 2022-12-05 NOTE — TELEPHONE ENCOUNTER
Regarding: Ongoing Cough (2 of 2)  ----- Message from Isrrael Mg sent at 12/5/2022  8:54 AM EST -----  "My  and I have been having a cough since the Saturday after Thanksgiving and it will not go away "

## 2022-12-06 ENCOUNTER — TELEPHONE (OUTPATIENT)
Dept: FAMILY MEDICINE CLINIC | Facility: CLINIC | Age: 72
End: 2022-12-06

## 2022-12-06 NOTE — TELEPHONE ENCOUNTER
Spoke to patients wife, she asked for call back with clarification for medication as soon as possible since patient needs to start taking  Just needs to know is the dose is taken together at same time or spread out through the day  Call back at [phone]

## 2022-12-06 NOTE — TELEPHONE ENCOUNTER
Patient's spouse, Hanna Chan ([phone]) called with question regarding instructions for medication prednisone  Patient understands instructions but asked for clarification if medication should be taken together at the same time or spread out through out the the day  Requests call back with instructions

## 2023-02-08 ENCOUNTER — OFFICE VISIT (OUTPATIENT)
Dept: PODIATRY | Facility: CLINIC | Age: 73
End: 2023-02-08

## 2023-02-08 VITALS
BODY MASS INDEX: 29.99 KG/M2 | HEIGHT: 66 IN | WEIGHT: 186.6 LBS | DIASTOLIC BLOOD PRESSURE: 84 MMHG | SYSTOLIC BLOOD PRESSURE: 128 MMHG

## 2023-02-08 DIAGNOSIS — L85.2 KERATODERMA PUNCTATA: Primary | ICD-10-CM

## 2023-02-08 NOTE — PROGRESS NOTES
Assessment/Plan:    The lesion described by the patient is most consistent with a punctate hyperkeratotic lesion  It is not present today and no treatment can be recommended  Patient told to contact me should it recur  No problem-specific Assessment & Plan notes found for this encounter  Diagnoses and all orders for this visit:    Keratoderma punctata          Subjective:      Patient ID: aMo Benton  is a 67 y o  male  HPI     Patient presents for examination of his right foot  Patient states that approximately 1 month ago he was having pain in the right arch where he noted a pimple-like lesion  Over the past few weeks this lesion fell off the foot and is no longer present  Patient no longer has right foot pain  The following portions of the patient's history were reviewed and updated as appropriate: allergies, current medications, past family history, past medical history, past social history, past surgical history and problem list     Review of Systems   Gastrointestinal:        GERD   Skin: Negative  Psychiatric/Behavioral: Negative  Objective:      /84   Ht 5' 6" (1 676 m)   Wt 84 6 kg (186 lb 9 6 oz)   BMI 30 12 kg/m²          Physical Exam  Constitutional:       Appearance: Normal appearance  Cardiovascular:      Pulses: Normal pulses  Musculoskeletal:         General: Normal range of motion  Skin:     Findings: No lesion  Comments: No lesions noted either foot  Neurological:      General: No focal deficit present  Mental Status: He is oriented to person, place, and time

## 2023-02-17 ENCOUNTER — RA CDI HCC (OUTPATIENT)
Dept: OTHER | Facility: HOSPITAL | Age: 73
End: 2023-02-17

## 2023-02-17 NOTE — PROGRESS NOTES
[address]  coding opportunities       Chart reviewed, no opportunity found: CHART REVIEWED, NO OPPORTUNITY FOUND        Patients Insurance     Medicare Insurance: Medicare

## 2023-02-25 PROBLEM — J40 BRONCHITIS: Status: RESOLVED | Noted: 2022-12-05 | Resolved: 2023-02-25

## 2023-02-25 PROBLEM — Z01.810 PREOP CARDIOVASCULAR EXAM: Status: RESOLVED | Noted: 2019-12-22 | Resolved: 2023-02-25

## 2023-02-28 ENCOUNTER — OFFICE VISIT (OUTPATIENT)
Dept: FAMILY MEDICINE CLINIC | Facility: CLINIC | Age: 73
End: 2023-02-28

## 2023-02-28 VITALS
WEIGHT: 187.2 LBS | DIASTOLIC BLOOD PRESSURE: 82 MMHG | HEART RATE: 72 BPM | HEIGHT: 66 IN | BODY MASS INDEX: 30.08 KG/M2 | TEMPERATURE: 98.5 F | SYSTOLIC BLOOD PRESSURE: 140 MMHG | RESPIRATION RATE: 16 BRPM | OXYGEN SATURATION: 99 %

## 2023-02-28 DIAGNOSIS — N40.1 BPH ASSOCIATED WITH NOCTURIA: ICD-10-CM

## 2023-02-28 DIAGNOSIS — N20.0 NEPHROLITHIASIS: ICD-10-CM

## 2023-02-28 DIAGNOSIS — K21.9 GASTROESOPHAGEAL REFLUX DISEASE, UNSPECIFIED WHETHER ESOPHAGITIS PRESENT: ICD-10-CM

## 2023-02-28 DIAGNOSIS — R35.1 BPH ASSOCIATED WITH NOCTURIA: ICD-10-CM

## 2023-02-28 DIAGNOSIS — R73.03 PREDIABETES: ICD-10-CM

## 2023-02-28 DIAGNOSIS — Z12.5 SCREENING FOR PROSTATE CANCER: ICD-10-CM

## 2023-02-28 DIAGNOSIS — M25.562 CHRONIC PAIN OF LEFT KNEE: ICD-10-CM

## 2023-02-28 DIAGNOSIS — E78.2 MIXED HYPERLIPIDEMIA: Primary | ICD-10-CM

## 2023-02-28 DIAGNOSIS — Z00.00 MEDICARE ANNUAL WELLNESS VISIT, SUBSEQUENT: ICD-10-CM

## 2023-02-28 DIAGNOSIS — G89.29 CHRONIC PAIN OF LEFT KNEE: ICD-10-CM

## 2023-02-28 NOTE — ASSESSMENT & PLAN NOTE
Recommended to follow a low-carb diet, work on weight reduction  Encouraged regular exercise  Check Hemoglobin A1C

## 2023-02-28 NOTE — PATIENT INSTRUCTIONS
Medicare Preventive Visit Patient Instructions  Thank you for completing your Welcome to Medicare Visit or Medicare Annual Wellness Visit today  Your next wellness visit will be due in one year (2/29/2024)  The screening/preventive services that you may require over the next 5-10 years are detailed below  Some tests may not apply to you based off risk factors and/or age  Screening tests ordered at today's visit but not completed yet may show as past due  Also, please note that scanned in results may not display below  Preventive Screenings:  Service Recommendations Previous Testing/Comments   Colorectal Cancer Screening  · Colonoscopy    · Fecal Occult Blood Test (FOBT)/Fecal Immunochemical Test (FIT)  · Fecal DNA/Cologuard Test  · Flexible Sigmoidoscopy Age: 39-70 years old   Colonoscopy: every 10 years (May be performed more frequently if at higher risk)  OR  FOBT/FIT: every 1 year  OR  Cologuard: every 3 years  OR  Sigmoidoscopy: every 5 years  Screening may be recommended earlier than age 39 if at higher risk for colorectal cancer  Also, an individualized decision between you and your healthcare provider will decide whether screening between the ages of 74-80 would be appropriate  Colonoscopy: 05/22/2020  FOBT/FIT: Not on file  Cologuard: Not on file  Sigmoidoscopy: Not on file          Prostate Cancer Screening Individualized decision between patient and health care provider in men between ages of 53-78   Medicare will cover every 12 months beginning on the day after your 50th birthday PSA: 4 2 ng/mL           Hepatitis C Screening Once for adults born between 1945 and 1965  More frequently in patients at high risk for Hepatitis C Hep C Antibody: 01/09/2020        Diabetes Screening 1-2 times per year if you're at risk for diabetes or have pre-diabetes Fasting glucose: 98 mg/dL (10/4/2021)  A1C: 6 0 % (10/4/2021)      Cholesterol Screening Once every 5 years if you don't have a lipid disorder   May order more often based on risk factors  Lipid panel: 10/04/2021         Other Preventive Screenings Covered by Medicare:  1  Abdominal Aortic Aneurysm (AAA) Screening: covered once if your at risk  You're considered to be at risk if you have a family history of AAA or a male between the age of 73-68 who smoking at least 100 cigarettes in your lifetime  2  Lung Cancer Screening: covers low dose CT scan once per year if you meet all of the following conditions: (1) Age 50-69; (2) No signs or symptoms of lung cancer; (3) Current smoker or have quit smoking within the last 15 years; (4) You have a tobacco smoking history of at least 20 pack years (packs per day x number of years you smoked); (5) You get a written order from a healthcare provider  3  Glaucoma Screening: covered annually if you're considered high risk: (1) You have diabetes OR (2) Family history of glaucoma OR (3)  aged 48 and older OR (3)  American aged 72 and older  3  Osteoporosis Screening: covered every 2 years if you meet one of the following conditions: (1) Have a vertebral abnormality; (2) On glucocorticoid therapy for more than 3 months; (3) Have primary hyperparathyroidism; (4) On osteoporosis medications and need to assess response to drug therapy  5  HIV Screening: covered annually if you're between the age of 12-76  Also covered annually if you are younger than 13 and older than 72 with risk factors for HIV infection  For pregnant patients, it is covered up to 3 times per pregnancy      Immunizations:  Immunization Recommendations   Influenza Vaccine Annual influenza vaccination during flu season is recommended for all persons aged >= 6 months who do not have contraindications   Pneumococcal Vaccine   * Pneumococcal conjugate vaccine = PCV13 (Prevnar 13), PCV15 (Vaxneuvance), PCV20 (Prevnar 20)  * Pneumococcal polysaccharide vaccine = PPSV23 (Pneumovax) Adults 25-60 years old: 1-3 doses may be recommended based on certain risk factors  Adults 72 years old: 1-2 doses may be recommended based off what pneumonia vaccine you previously received   Hepatitis B Vaccine 3 dose series if at intermediate or high risk (ex: diabetes, end stage renal disease, liver disease)   Tetanus (Td) Vaccine - COST NOT COVERED BY MEDICARE PART B Following completion of primary series, a booster dose should be given every 10 years to maintain immunity against tetanus  Td may also be given as tetanus wound prophylaxis  Tdap Vaccine - COST NOT COVERED BY MEDICARE PART B Recommended at least once for all adults  For pregnant patients, recommended with each pregnancy  Shingles Vaccine (Shingrix) - COST NOT COVERED BY MEDICARE PART B  2 shot series recommended in those aged 48 and above     Health Maintenance Due:      Topic Date Due   • Colorectal Cancer Screening  05/22/2023   • Hepatitis C Screening  Completed     Immunizations Due:      Topic Date Due   • COVID-19 Vaccine (1) Never done   • Pneumococcal Vaccine: 65+ Years (1 - PCV) Never done   • Influenza Vaccine (1) Never done     Advance Directives   What are advance directives? Advance directives are legal documents that state your wishes and plans for medical care  These plans are made ahead of time in case you lose your ability to make decisions for yourself  Advance directives can apply to any medical decision, such as the treatments you want, and if you want to donate organs  What are the types of advance directives? There are many types of advance directives, and each state has rules about how to use them  You may choose a combination of any of the following:  · Living will: This is a written record of the treatment you want  You can also choose which treatments you do not want, which to limit, and which to stop at a certain time  This includes surgery, medicine, IV fluid, and tube feedings  · Durable power of  for healthcare [facility]):   This is a written record that states who you want to make healthcare choices for you when you are unable to make them for yourself  This person, called a proxy, is usually a family member or a friend  You may choose more than 1 proxy  · Do not resuscitate (DNR) order:  A DNR order is used in case your heart stops beating or you stop breathing  It is a request not to have certain forms of treatment, such as CPR  A DNR order may be included in other types of advance directives  · Medical directive: This covers the care that you want if you are in a coma, near death, or unable to make decisions for yourself  You can list the treatments you want for each condition  Treatment may include pain medicine, surgery, blood transfusions, dialysis, IV or tube feedings, and a ventilator (breathing machine)  · Values history: This document has questions about your views, beliefs, and how you feel and think about life  This information can help others choose the care that you would choose  Why are advance directives important? An advance directive helps you control your care  Although spoken wishes may be used, it is better to have your wishes written down  Spoken wishes can be misunderstood, or not followed  Treatments may be given even if you do not want them  An advance directive may make it easier for your family to make difficult choices about your care  Weight Management   Why it is important to manage your weight:  Being overweight increases your risk of health conditions such as heart disease, high blood pressure, type 2 diabetes, and certain types of cancer  It can also increase your risk for osteoarthritis, sleep apnea, and other respiratory problems  Aim for a slow, steady weight loss  Even a small amount of weight loss can lower your risk of health problems  How to lose weight safely:  A safe and healthy way to lose weight is to eat fewer calories and get regular exercise   You can lose up about 1 pound a week by decreasing the number of calories you eat by 500 calories each day  Healthy meal plan for weight management:  A healthy meal plan includes a variety of foods, contains fewer calories, and helps you stay healthy  A healthy meal plan includes the following:  · Eat whole-grain foods more often  A healthy meal plan should contain fiber  Fiber is the part of grains, fruits, and vegetables that is not broken down by your body  Whole-grain foods are healthy and provide extra fiber in your diet  Some examples of whole-grain foods are whole-wheat breads and pastas, oatmeal, brown rice, and bulgur  · Eat a variety of vegetables every day  Include dark, leafy greens such as spinach, kale, heidi greens, and mustard greens  Eat yellow and orange vegetables such as carrots, sweet potatoes, and winter squash  · Eat a variety of fruits every day  Choose fresh or canned fruit (canned in its own juice or light syrup) instead of juice  Fruit juice has very little or no fiber  · Eat low-fat dairy foods  Drink fat-free (skim) milk or 1% milk  Eat fat-free yogurt and low-fat cottage cheese  Try low-fat cheeses such as mozzarella and other reduced-fat cheeses  · Choose meat and other protein foods that are low in fat  Choose beans or other legumes such as split peas or lentils  Choose fish, skinless poultry (chicken or turkey), or lean cuts of red meat (beef or pork)  Before you cook meat or poultry, cut off any visible fat  · Use less fat and oil  Try baking foods instead of frying them  Add less fat, such as margarine, sour cream, regular salad dressing and mayonnaise to foods  Eat fewer high-fat foods  Some examples of high-fat foods include french fries, doughnuts, ice cream, and cakes  · Eat fewer sweets  Limit foods and drinks that are high in sugar  This includes candy, cookies, regular soda, and sweetened drinks  Exercise:  Exercise at least 30 minutes per day on most days of the week   Some examples of exercise include walking, biking, dancing, and swimming  You can also fit in more physical activity by taking the stairs instead of the elevator or parking farther away from stores  Ask your healthcare provider about the best exercise plan for you  © Copyright [address] 2018 Information is for End User's use only and may not be sold, redistributed or otherwise used for commercial purposes   All illustrations and images included in CareNotes® are the copyrighted property of [employer]  or [address] [place] [address]

## 2023-02-28 NOTE — ASSESSMENT & PLAN NOTE
Continue Fenofibtate 160 mg daily, Zetia 10 mg daily  Follow a low-cholesterol diet, regular exercise  Check CMP, lipid panel

## 2023-02-28 NOTE — ASSESSMENT & PLAN NOTE
Left knee pain is likely secondary to osteoarthritis  Will check x-ray of the left knee  Recommended to schedule orthopedic surgical evaluation to consider cortisone shot vs lubricating injections

## 2023-02-28 NOTE — ASSESSMENT & PLAN NOTE
Patient reeports no back pain, no blood in urine  Recommended to stay well-hydrated  Follow-up with urology

## 2023-02-28 NOTE — PROGRESS NOTES
Chief Complaint   Patient presents with   • Medicare Wellness Visit     Subsequent visit      Health Maintenance   Topic Date Due   • COVID-19 Vaccine (1) Never done   • Pneumococcal Vaccine: 65+ Years (1 - PCV) Never done   • Influenza Vaccine (1) Never done   • Colorectal Cancer Screening  05/22/2023   • Fall Risk  02/28/2024   • Depression Screening  02/28/2024   • Medicare Annual Wellness Visit (AWV)  02/28/2024   • BMI: Followup Plan  02/28/2024   • BMI: Adult  02/28/2024   • Hepatitis C Screening  Completed   • HIB Vaccine  Aged Out   • IPV Vaccine  Aged Out   • Hepatitis A Vaccine  Aged Out   • Meningococcal ACWY Vaccine  Aged Out   • HPV Vaccine  Aged Out        Assessment and Plan:     Problem List Items Addressed This Visit        Digestive    GERD (gastroesophageal reflux disease)     Symptoms imroved  Patient stopped taking famotidine  Recommended to avoid spicy, fried, fatty foods, caffeine  Relevant Orders    CBC and differential       Genitourinary    Nephrolithiasis     Patient reeports no back pain, no blood in urine  Recommended to stay well-hydrated  Follow-up with urology  Other    Mixed hyperlipidemia - Primary     Continue Fenofibtate 160 mg daily, Zetia 10 mg daily  Follow a low-cholesterol diet, regular exercise  Check CMP, lipid panel  Relevant Orders    Comprehensive metabolic panel    Lipid panel    Prediabetes     Recommended to follow a low-carb diet, work on weight reduction  Encouraged regular exercise  Check Hemoglobin A1C  Relevant Orders    Comprehensive metabolic panel    Hemoglobin A1C    BPH associated with nocturia     Symptoms are stable  Medicare annual wellness visit, subsequent    Chronic pain of left knee     Left knee pain is likely secondary to osteoarthritis  Will check x-ray of the left knee      Recommended to schedule orthopedic surgical evaluation to consider cortisone shot vs lubricating injections  Relevant Orders    XR knee 3 vw left non injury    Ambulatory Referral to Orthopedic Surgery   Other Visit Diagnoses     Screening for prostate cancer        Relevant Orders    PSA, Total Screen        BMI Counseling: Body mass index is 30 21 kg/m²  The BMI is above normal  Nutrition recommendations include decreasing portion sizes, encouraging healthy choices of fruits and vegetables, decreasing fast food intake, consuming healthier snacks, limiting drinks that contain sugar, moderation in carbohydrate intake, increasing intake of lean protein, reducing intake of saturated and trans fat and reducing intake of cholesterol  Exercise recommendations include exercising 3-5 times per week  No pharmacotherapy was ordered  Rationale for BMI follow-up plan is due to patient being overweight or obese  Depression Screening and Follow-up Plan: Patient was screened for depression during today's encounter  They screened negative with a PHQ-2 score of 0  Preventive health issues were discussed with patient, and age appropriate screening tests were ordered as noted in patient's After Visit Summary  Personalized health advice and appropriate referrals for health education or preventive services given if needed, as noted in patient's After Visit Summary  Schedule follow-up visit, Medicare AWV in 1 year  Call office with any acute problems  History of Present Illness:     Patient presents for a Medicare Wellness Visit    HPI     Patient presents for follow-up visit, Medicare AWV accompanied by his wife  PMHx: Hyperlipidemia, Prediabetes, GERD, LPR, chronic rhinitis, kidney stones, BPH  Reviewed current medications, last blood test results from October 2021  Hyperlipidemia - patient takes Zetia 10 mg daily, Fenofibrate 160 mg daily  C/o cracking and pain in his left knee for the past few months  Reports no recent injury or falls  GERD - symptoms improved    Patient stopped taking Famotidine  Denies abdominal pain, heartburn    Refusing immunizations  Denies family history of colon cancer  Colonoscopy done in May 202o by colorectal surgeon Dr Jose Carlos Blum who recommended to repeat colonoscopy in 3 years due to history of colon polyps  Patient Care Team:  Charmayne Ali, MD as PCP - General  MD Shane Bernard MD (Colon and Rectal Surgery)     Review of Systems:     Review of Systems   Constitutional: Negative for activity change, appetite change, chills, fatigue and fever  HENT: Positive for congestion (mild)  Negative for ear pain, hearing loss, nosebleeds, sore throat and trouble swallowing  Eyes: Negative for pain, discharge, redness, itching and visual disturbance  Wears glasses   Respiratory: Negative for cough, chest tightness, shortness of breath and wheezing  Cardiovascular: Negative for chest pain, palpitations and leg swelling  Gastrointestinal: Negative for abdominal pain, blood in stool, constipation, diarrhea, nausea and vomiting  Denies heartburn   Genitourinary: Negative for difficulty urinating, dysuria and hematuria  Nocturia x 2   Musculoskeletal: Positive for arthralgias (left knee)  Negative for back pain, gait problem, joint swelling and myalgias  Skin: Negative for rash  Skin itching   Neurological: Negative for dizziness, syncope and headaches  Hematological: Negative  Psychiatric/Behavioral: Negative for dysphoric mood and sleep disturbance  The patient is not nervous/anxious           Problem List:     Patient Active Problem List   Diagnosis   • Mixed hyperlipidemia   • Prediabetes   • Nephrolithiasis   • BPH associated with nocturia   • Medicare annual wellness visit, subsequent   • Elevated PSA   • GERD (gastroesophageal reflux disease)   • Groin pain, left   • Non-healing skin lesion of nose   • Status post left inguinal hernia repair   • Age-related cataract of left eye   • Age-related cataract of right eye   • Chronic pain of left knee      Past Medical and Surgical History:     Past Medical History:   Diagnosis Date   • Allergic rhinitis    • BPH (benign prostatic hyperplasia)    • GERD (gastroesophageal reflux disease)    • HL (hearing loss)     Wears hearing aids   • Hyperlipidemia    • Inguinal hernia of left side with obstruction    • Nephrolithiasis    • Tinnitus      Past Surgical History:   Procedure Laterality Date   • ADENOIDECTOMY     • INGUINAL HERNIA REPAIR     • SD RPR 1ST INGUN HRNA AGE 5 YRS/> REDUCIBLE Left 10/14/2021    Procedure: REPAIR HERNIA INGUINAL;  Surgeon: Jaret Dean MD;  Location: [facility];  Service: General   • TONSILLECTOMY     • WISDOM TOOTH EXTRACTION        Family History:     Family History   Problem Relation Age of Onset   • Skin cancer Father       Social History:     Social History     Socioeconomic History   • Marital status: /Civil Union     Spouse name: None   • Number of children: None   • Years of education: None   • Highest education level: None   Occupational History   • None   Tobacco Use   • Smoking status: Never   • Smokeless tobacco: Never   Vaping Use   • Vaping Use: Never used   Substance and Sexual Activity   • Alcohol use: No   • Drug use: No   • Sexual activity: None   Other Topics Concern   • None   Social History Narrative   • None     Social Determinants of Health     Financial Resource Strain: Not on file   Food Insecurity: Not on file   Transportation Needs: Not on file   Physical Activity: Not on file   Stress: Not on file   Social Connections: Not on file   Intimate Partner Violence: Not on file   Housing Stability: Not on file      Medications and Allergies:     Current Outpatient Medications   Medication Sig Dispense Refill   • Calcium Carb-Cholecalciferol 600-800 MG-UNIT TABS Take by mouth daily      • ezetimibe (ZETIA) 10 mg tablet Take 1 tablet (10 mg total) by mouth daily 90 tablet 3   • Glucosamine-Chondroitin--400-375 MG TABS Take by mouth daily      • ipratropium (ATROVENT) 0 06 % nasal spray 2 sprays into each nostril 4 (four) times a day (Patient taking differently: 2 sprays into each nostril 2 (two) times a day) 45 mL 3   • multivitamin (THERAGRAN) TABS Take by mouth     • fenofibrate (Lofibra) 160 MG tablet Take 1 tablet (160 mg total) by mouth daily 90 tablet 3     No current facility-administered medications for this visit  Allergies   Allergen Reactions   • Other Allergic Rhinitis     Environmental      Immunizations: There is no immunization history for the selected administration types on file for this patient  Health Maintenance:         Topic Date Due   • Colorectal Cancer Screening  05/22/2023   • Hepatitis C Screening  Completed         Topic Date Due   • COVID-19 Vaccine (1) Never done   • Pneumococcal Vaccine: 65+ Years (1 - PCV) Never done   • Influenza Vaccine (1) Never done      Medicare Screening Tests and Risk Assessments:     Georgiana Medeiros is here for his Subsequent Wellness visit  Health Risk Assessment:   Patient rates overall health as good  Patient feels that their physical health rating is same  Patient is satisfied with their life  Eyesight was rated as same  Hearing was rated as same  Patient feels that their emotional and mental health rating is same  Patients states they are never, rarely angry  Patient states they are never, rarely unusually tired/fatigued  Pain experienced in the last 7 days has been some  Patient's pain rating has been 3/10  Patient states that he has experienced no weight loss or gain in last 6 months  Depression Screening:   PHQ-2 Score: 0      Fall Risk Screening: In the past year, patient has experienced: no history of falling in past year      Home Safety:  Patient does not have trouble with stairs inside or outside of their home  Patient has working smoke alarms and has working carbon monoxide detector   Home safety hazards include: none  Nutrition:   Current diet is Regular and Low Carb  Medications:   Patient is not currently taking any over-the-counter supplements  Patient is able to manage medications  Activities of Daily Living (ADLs)/Instrumental Activities of Daily Living (IADLs):   Walk and transfer into and out of bed and chair?: Yes  Dress and groom yourself?: Yes    Bathe or shower yourself?: Yes    Feed yourself? Yes  Do your laundry/housekeeping?: Yes  Manage your money, pay your bills and track your expenses?: Yes  Make your own meals?: Yes    Do your own shopping?: Yes    Previous Hospitalizations:   Any hospitalizations or ED visits within the last 12 months?: No      Advance Care Planning:   Living will: No    Durable POA for healthcare: No    Advanced directive: No    Advanced directive counseling given: Yes      Cognitive Screening:   Provider or family/friend/caregiver concerned regarding cognition?: No    PREVENTIVE SCREENINGS      Cardiovascular Screening:    General: Screening Not Indicated and History Lipid Disorder      Colorectal Cancer Screening:     General: Screening Current      Osteoporosis Screening:    General: Risks and Benefits Discussed      Abdominal Aortic Aneurysm (AAA) Screening:    Risk factors include: age between 73-67 yo        Lung Cancer Screening:     General: Screening Not Indicated      Hepatitis C Screening:    General: Screening Current    Screening, Brief Intervention, and Referral to Treatment (SBIRT)    Screening  Typical number of drinks in a day: 0  Typical number of drinks in a week: 2  Interpretation: Low risk drinking behavior      Single Item Drug Screening:  How often have you used an illegal drug (including marijuana) or a prescription medication for non-medical reasons in the past year? never    Single Item Drug Screen Score: 0  Interpretation: Negative screen for possible drug use disorder    Other Counseling Topics:   Car/seat belt/driving safety, skin self-exam and calcium and vitamin D intake and regular weightbearing exercise  No results found  Physical Exam:     /82 (BP Location: Left arm, Patient Position: Sitting, Cuff Size: Standard)   Pulse 72   Temp 98 5 °F (36 9 °C) (Tympanic)   Resp 16   Ht 5' 6" (1 676 m)   Wt 84 9 kg (187 lb 3 2 oz)   SpO2 99%   BMI 30 21 kg/m²     Physical Exam  Constitutional:       Appearance: Normal appearance  He is obese  HENT:      Head: Normocephalic and atraumatic  Eyes:      Conjunctiva/sclera: Conjunctivae normal       Pupils: Pupils are equal, round, and reactive to light  Neck:      Vascular: No carotid bruit  Cardiovascular:      Rate and Rhythm: Normal rate and regular rhythm  Heart sounds: No murmur heard  Pulmonary:      Effort: Pulmonary effort is normal       Breath sounds: Normal breath sounds  Abdominal:      General: There is no distension  Palpations: Abdomen is soft  Tenderness: There is no abdominal tenderness  Musculoskeletal:         General: No swelling  Normal range of motion  Cervical back: Normal range of motion and neck supple  Right lower leg: No edema  Left lower leg: No edema  Comments: Left knee exam: mild tenderness over medial aspect  Crepitus with ROM  Skin:     General: Skin is warm and dry  Findings: No rash  Neurological:      General: No focal deficit present  Mental Status: He is alert and oriented to person, place, and time  Motor: No weakness        Gait: Gait normal    Psychiatric:         Mood and Affect: Mood normal           Tyra Cantu MD

## 2023-02-28 NOTE — ASSESSMENT & PLAN NOTE
Symptoms imroved  Patient stopped taking famotidine  Recommended to avoid spicy, fried, fatty foods, caffeine

## 2023-03-03 ENCOUNTER — HOSPITAL ENCOUNTER (OUTPATIENT)
Dept: RADIOLOGY | Facility: HOSPITAL | Age: 73
Discharge: HOME | End: 2023-03-03

## 2023-03-03 DIAGNOSIS — M25.562 CHRONIC PAIN OF LEFT KNEE: ICD-10-CM

## 2023-03-03 DIAGNOSIS — G89.29 CHRONIC PAIN OF LEFT KNEE: ICD-10-CM

## 2023-03-06 ENCOUNTER — APPOINTMENT (OUTPATIENT)
Dept: LAB | Facility: CLINIC | Age: 73
End: 2023-03-06

## 2023-03-06 DIAGNOSIS — E78.2 MIXED HYPERLIPIDEMIA: ICD-10-CM

## 2023-03-06 DIAGNOSIS — Z12.5 SCREENING FOR PROSTATE CANCER: ICD-10-CM

## 2023-03-06 DIAGNOSIS — K21.9 GASTROESOPHAGEAL REFLUX DISEASE, UNSPECIFIED WHETHER ESOPHAGITIS PRESENT: ICD-10-CM

## 2023-03-06 DIAGNOSIS — E83.52 HYPERCALCEMIA: ICD-10-CM

## 2023-03-06 DIAGNOSIS — R73.03 PREDIABETES: ICD-10-CM

## 2023-03-06 DIAGNOSIS — R97.20 ELEVATED PSA: Primary | ICD-10-CM

## 2023-03-06 LAB
ALBUMIN SERPL BCP-MCNC: 4.1 G/DL (ref 3.5–5)
ALP SERPL-CCNC: 31 U/L (ref 46–116)
ALT SERPL W P-5'-P-CCNC: 36 U/L (ref 12–78)
ANION GAP SERPL CALCULATED.3IONS-SCNC: 6 MMOL/L (ref 4–13)
AST SERPL W P-5'-P-CCNC: 33 U/L (ref 5–45)
BASOPHILS # BLD AUTO: 0.1 THOUSANDS/ÂΜL (ref 0–0.1)
BASOPHILS NFR BLD AUTO: 2 % (ref 0–1)
BILIRUB SERPL-MCNC: 0.44 MG/DL (ref 0.2–1)
BUN SERPL-MCNC: 20 MG/DL (ref 5–25)
CALCIUM SERPL-MCNC: 10.3 MG/DL (ref 8.3–10.1)
CHLORIDE SERPL-SCNC: 107 MMOL/L (ref 96–108)
CHOLEST SERPL-MCNC: 178 MG/DL
CO2 SERPL-SCNC: 26 MMOL/L (ref 21–32)
CREAT SERPL-MCNC: 1.28 MG/DL (ref 0.6–1.3)
EOSINOPHIL # BLD AUTO: 0.2 THOUSAND/ÂΜL (ref 0–0.61)
EOSINOPHIL NFR BLD AUTO: 3 % (ref 0–6)
ERYTHROCYTE [DISTWIDTH] IN BLOOD BY AUTOMATED COUNT: 14.1 % (ref 11.6–15.1)
EST. AVERAGE GLUCOSE BLD GHB EST-MCNC: 123 MG/DL
GFR SERPL CREATININE-BSD FRML MDRD: 55 ML/MIN/1.73SQ M
GLUCOSE P FAST SERPL-MCNC: 90 MG/DL (ref 65–99)
HBA1C MFR BLD: 5.9 %
HCT VFR BLD AUTO: 44.4 % (ref 36.5–49.3)
HDLC SERPL-MCNC: 37 MG/DL
HGB BLD-MCNC: 14.5 G/DL (ref 12–17)
IMM GRANULOCYTES # BLD AUTO: 0.02 THOUSAND/UL (ref 0–0.2)
IMM GRANULOCYTES NFR BLD AUTO: 0 % (ref 0–2)
LDLC SERPL CALC-MCNC: 108 MG/DL (ref 0–100)
LYMPHOCYTES # BLD AUTO: 2.34 THOUSANDS/ÂΜL (ref 0.6–4.47)
LYMPHOCYTES NFR BLD AUTO: 36 % (ref 14–44)
MCH RBC QN AUTO: 29.7 PG (ref 26.8–34.3)
MCHC RBC AUTO-ENTMCNC: 32.7 G/DL (ref 31.4–37.4)
MCV RBC AUTO: 91 FL (ref 82–98)
MONOCYTES # BLD AUTO: 0.84 THOUSAND/ÂΜL (ref 0.17–1.22)
MONOCYTES NFR BLD AUTO: 13 % (ref 4–12)
NEUTROPHILS # BLD AUTO: 2.94 THOUSANDS/ÂΜL (ref 1.85–7.62)
NEUTS SEG NFR BLD AUTO: 46 % (ref 43–75)
NONHDLC SERPL-MCNC: 141 MG/DL
NRBC BLD AUTO-RTO: 0 /100 WBCS
PLATELET # BLD AUTO: 243 THOUSANDS/UL (ref 149–390)
PMV BLD AUTO: 12.8 FL (ref 8.9–12.7)
POTASSIUM SERPL-SCNC: 4.4 MMOL/L (ref 3.5–5.3)
PROT SERPL-MCNC: 7.8 G/DL (ref 6.4–8.4)
PSA SERPL-MCNC: 7.6 NG/ML (ref 0–4)
RBC # BLD AUTO: 4.88 MILLION/UL (ref 3.88–5.62)
SODIUM SERPL-SCNC: 139 MMOL/L (ref 135–147)
TRIGL SERPL-MCNC: 167 MG/DL
WBC # BLD AUTO: 6.44 THOUSAND/UL (ref 4.31–10.16)

## 2023-03-13 ENCOUNTER — OFFICE VISIT (OUTPATIENT)
Dept: NEUROLOGY | Facility: CLINIC | Age: 73
End: 2023-03-13

## 2023-03-13 ENCOUNTER — TELEPHONE (OUTPATIENT)
Dept: FAMILY MEDICINE CLINIC | Facility: CLINIC | Age: 73
End: 2023-03-13

## 2023-03-13 VITALS
BODY MASS INDEX: 30.05 KG/M2 | TEMPERATURE: 97.2 F | HEIGHT: 66 IN | OXYGEN SATURATION: 95 % | DIASTOLIC BLOOD PRESSURE: 80 MMHG | HEART RATE: 66 BPM | WEIGHT: 187 LBS | SYSTOLIC BLOOD PRESSURE: 122 MMHG

## 2023-03-13 DIAGNOSIS — E78.2 MIXED HYPERLIPIDEMIA: ICD-10-CM

## 2023-03-13 DIAGNOSIS — R41.3 MEMORY DIFFICULTY: Primary | ICD-10-CM

## 2023-03-13 RX ORDER — FENOFIBRATE 160 MG/1
160 TABLET ORAL DAILY
Qty: 90 TABLET | Refills: 3 | Status: SHIPPED | OUTPATIENT
Start: 2023-03-13 | End: 2023-06-11

## 2023-03-13 RX ORDER — EZETIMIBE 10 MG/1
10 TABLET ORAL DAILY
Qty: 90 TABLET | Refills: 3 | Status: SHIPPED | OUTPATIENT
Start: 2023-03-13

## 2023-03-13 NOTE — PROGRESS NOTES
Patient ID: Magdiel Chacko  is a 67 y o  male  Assessment/Plan:    Memory difficulty  Magdiel Chacko  is a 70-year-old male with forgetfulness and most difficulty with recalling names (not of family or friends, more just associates)  He has no day-to-day difficulty and is independent of all ADLs  In further conversation he appears to actually be quite high functioning  No red flags on exam or history with speaking to him today  His [place] cognitive assessment was 24/30, just one-point outside of "normal"  We did discuss today that forgetfulness, especially such as losing a train of thought, forgetting what you went into her room for, or having to think momentarily for the name of someone that you do not necessarily speak to on a daily basis is not unusual, especially as we age  This could be considered a part of normal aging  We did discuss measures to improve concentration and attention, which can also contribute to this difficulty  Nonetheless due to his concerns, we will check a Lyme antibody, TSH, and vitamin B12 level  We also discussed that he could proceed with a one-time MRI brain neuro quant analysis to establish a baseline as well as look for any intracranial abnormalities that could cause difficulty with recall such as stroke, or atrophy concerning for a neurodegenerative process  We also discussed that there is concern that he may have obstructive sleep apnea, which may also affect his memory as well as contribute to his daytime fatigue, a m  headaches, and loud snoring  I recommended he consider a sleep study and treatment with CPAP should NOEMÍ be confirmed  However, he is adamant that he will not sleep with a CPAP and therefore is not interested in testing at this time  We did review the risks of untreated NOEMÍ including increased risk for stroke and MI, however he continues to decline  We did discuss other lifestyle modifications to help support a healthy memory    He was offered to follow-up after testing, however he would prefer to follow-up only if testing is abnormal   Otherwise he will continue measures to support a healthy memory and return to neurology on an as-needed basis  Plan:    Complete Labs (Lyme antibody, TSH, vitamin B12)    Complete MRI Brain neuroquant analysis    Follow up as needed; unless testing is abnormal then return to review    Patient was encouraged to increase mind stimulating activities such as reading, crosswords, word searches, puzzles, Soduku, solitaire, coloring and other brain games  We also discussed the importance of staying physically active and eating a health diet such as the Mediterranean or MIND diet  Things that we know are helpful for thinking and memory   1  Exercise program: gradually increase your physical activity over time  Start small and be patient  Aerobic (cardio) activity is best but incorporate balance, strength and flexibility training as well    a  Try to get at least 30min 3 times per week   2  Diet: Mediterranean diet (colorful fruits and vegetables, olive oil, fish, whole grains, very little red meet is any), MIND diet, anti-inflammatory diet  a  Stay well hydrated: drink 6-8 glasses of water per day   b  What's good for your heart is good for your brain  c  Avoid high-salt foods   3  Sleep: aim for at least 7-8 hours per night   a  Avoid alcohol and medications like Benadryl (Tylenol PM) or other sedating drugs  4  Stress management/mindfulness practice:   a  Talk with our social workers about finding a cognitive behavioral therapists  b  Try a smart phone keegan like “Supersonic” or “mindfulness for beginners”   i  Try “curable” for pain    c  Take a course in Mindfulness Based Stress Reduction (MBSR)   thaddeus mercado  Read or listen to an audiobook about it:  i  Mindfulness for beginners   ii  10% happier  iii  The happiness advantage   5   Social engagement:   a  Stay in touch with family and friends   b  Plan a few specific activities for your social health every week   c  Donna Brothers a local support group   d  Volunteer! www [email or web address] or call [phone]     MIND diet score:  1 point for each component  · Green leafy vegetables: at least 6 per week  · Other vegetable: at least 1 per day   · Berries: at least 2 per week  · Red meat: fewer than 4 per week   · Fish: at least 1 per week   · Poultry: at least 2 per week  · Beans: at least 3 per week  · Nuts: at least 5 per week  · Fast or fried food: less than 1 per week  · Olive oil   · Butter less: less than 1 table-spoon per day   · Cheese: less than 1 serving per week   · Pastries/sweets: less than 5 servings per week  · Alcohol: no more than 1 serving/ day        Diagnoses and all orders for this visit:    Memory difficulty  -     MRI brain NeuroQuant wo contrast; Future  -     TSH, 3rd generation with Free T4 reflex; Future  -     Vitamin B12; Future  -     Lyme Antibody Profile with reflex to WB; Future         I have spent a total time of 50 minutes on 03/13/23 in caring for this patient including Prognosis, Risks and benefits of tx options, Instructions for management, Patient and family education, Risk factor reductions, Impressions, Counseling / Coordination of care, Documenting in the medical record, Reviewing / ordering tests, medicine, procedures   and Obtaining or reviewing history    Subjective:    DEMARIO Dre Hutchison  is a 42-year-old male with a past medical history significant for GERD, nephrolithiasis, prediabetes, hyperlipidemia, chronic knee pain, BPH, and bilateral cataracts who presents to the office in consultation today as a self-referral for forgetfulness  He tells me today that he feels his forgetfulness has been progressive, first noted a couple of years ago  He specifically states he has noted more difficulty with recalling names of familiar faces at [employer]    He also occasionally forgets what he went into a room for and loses his train of thought during conversation  He lives at home with his wife marie  He is retired; has had several jobs in the past  Highest level of education was HS  He drives; no difficulty with driving with the exception of vision due to cataracts  He does not get lost while driving  His wife has always managed his medications  His wife also manages their finances; he supervises and reviews investments and savings  He has not made overpayments or missed payments  He denies any hallucinations  His mom had difficulty word finding in her late [de-identified]  Otherwise, no family hx of dementia  He denies any personal hx of stroke, or TBI  He denies any personal hx of cancer, chemo, or radiation  He takes a multivitamin every day  He denies any hx of etoh, smoking, illicit drug use  He consumes about 2 glasses of water a day   Caffeine-does not drink coffee or tea; does have 1 soda  Diet- eats a lot of fast food  He has no difficulty recognizing family or friends; only has difficulty with coming up with the name of people at [facility]  Sleep- 6-8 hours; loud snoring, no apneas/coughing/gasping/choking  Sometimes has AM headaches, excessive daytime sleepiness/fatigue   No hx of sleep apnea; no prior sleep study  Denies confusion about his surroundings  He uses house hold items without confusion  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  He is independent of all ADLs  He shops independently   Denies any hx of depression or anxiety   During the day he plays games on the computer or cell phone, he also does things around the house  Review of labs  HgbA1c 3/6/23 5 9  LDL 3/6/23 108  eGFR 3/6/23 55    The following portions of the patient's history were reviewed and updated as appropriate: allergies, current medications, past family history, past medical history, past social history and past surgical history      Objective:    Blood pressure 122/80, pulse 66, temperature (!) 97 2 °F (36 2 °C), temperature source Temporal, height 5' 6" (1 676 m), weight 84 8 kg (187 lb), SpO2 95 %  Physical Exam  Constitutional:       General: He is not in acute distress  Appearance: Normal appearance  He is obese  He is not ill-appearing  HENT:      Head: Normocephalic and atraumatic  Nose: Nose normal       Mouth/Throat:      Mouth: Mucous membranes are moist       Pharynx: Oropharynx is clear  No oropharyngeal exudate or posterior oropharyngeal erythema  Eyes:      Extraocular Movements: Extraocular movements intact  Conjunctiva/sclera: Conjunctivae normal       Pupils: Pupils are equal, round, and reactive to light  Cardiovascular:      Rate and Rhythm: Normal rate and regular rhythm  Pulses: Normal pulses  Pulmonary:      Effort: Pulmonary effort is normal    Musculoskeletal:         General: Normal range of motion  Cervical back: Normal range of motion and neck supple  Right lower leg: No edema  Left lower leg: No edema  Skin:     General: Skin is warm and dry  Capillary Refill: Capillary refill takes less than 2 seconds  Neurological:      General: No focal deficit present  Mental Status: He is oriented to person, place, and time  Cranial Nerves: No cranial nerve deficit  Sensory: No sensory deficit  Motor: No weakness  Coordination: Coordination normal       Gait: Gait normal       Deep Tendon Reflexes: Reflexes normal    Psychiatric:         Mood and Affect: Mood normal          Behavior: Behavior normal          Thought Content: Thought content normal          Judgment: Judgment normal        Neurological Examination  On neurological examination patient is alert, awake, oriented and in no distress  He is an excellent historian  Speech is fluent without dysarthria or aphasia  Cranial nerves 2-12 were symmetrically intact bilaterally, with the exception of a chronic right eye lid droop that he tells me has been present since childhood    No evidence of any focal weakness or sensory loss in the upper or lower extremities  Motor testing reveals 5/5 strength of the bilateral upper and lower extremities  There was no pronator drift  No fasciculations present  No abnormal involuntary movements  Finger- to-nose reveals no tremor or ataxia and intact proprioceptive function, no dysmetria was noted  Rapid alternating movement normal  Sensation was intact to vibration, light touch, and temperature in bilateral upper and lower extremities  Deep tendon reflexes were 2+ and symmetric in the bilateral upper and lower extremities  He is able to rise easily without assistance from a seated position  Casual gait is normal including stance, stride, and arm swing  Normal tandem gait  Romberg is absent  Memory Examination  [place] Cognitive Assessment was 24/30  He correctly tells me his name, date of birth, today's date, and can name the current president of [place]  He is able to read, repeat, name to simple items, and state their purpose  He recalls what he had for dinner yesterday evening  He is able to name 10/10 animals <1 minute without difficulty  He is able to spell WORLD backwards with 1 error noted mixing up the O and R  There is no left-right confusion  He is able to complete multistep commands  He does not distinguish double stimuli  ROS:    Review of Systems   Constitutional: Negative  Negative for appetite change and fever  HENT: Negative  Negative for hearing loss, tinnitus, trouble swallowing and voice change  Eyes: Negative  Negative for photophobia, pain and visual disturbance  Respiratory: Negative  Negative for shortness of breath  Cardiovascular: Negative  Negative for palpitations  Gastrointestinal: Negative  Negative for nausea and vomiting  Endocrine: Negative  Negative for cold intolerance  Genitourinary: Negative  Negative for dysuria, frequency and urgency  Musculoskeletal: Negative    Negative for gait problem, myalgias and neck pain  Skin: Negative  Negative for rash  Allergic/Immunologic: Negative  Neurological: Positive for headaches  Negative for dizziness, tremors, seizures, syncope, facial asymmetry, speech difficulty, weakness, light-headedness and numbness  Hematological: Negative  Does not bruise/bleed easily  Psychiatric/Behavioral: Negative  Negative for confusion, hallucinations and sleep disturbance  Memory problems       Reviewed ROS as entered by medical assistant

## 2023-03-13 NOTE — TELEPHONE ENCOUNTER
Requests refill on Ipratropium nasal spray and   Famotidine 20 mg 1 bid   90 day supply    (on old med list)

## 2023-03-13 NOTE — PATIENT INSTRUCTIONS
Complete Labs    Complete MRI Brain     Follow up as needed; unless testing is abnormal then return to review    Patient was encouraged to increase mind stimulating activities such as reading, crosswords, word searches, puzzles, Soduku, solitaire, coloring and other brain games  We also discussed the importance of staying physically active and eating a health diet such as the Mediterranean or MIND diet  Things that we know are helpful for thinking and memory   Exercise program: gradually increase your physical activity over time  Start small and be patient  Aerobic (cardio) activity is best but incorporate balance, strength and flexibility training as well  Try to get at least 30min 3 times per week   Diet: Mediterranean diet (colorful fruits and vegetables, olive oil, fish, whole grains, very little red meet is any), MIND diet, anti-inflammatory diet  Stay well hydrated: drink 6-8 glasses of water per day   What's good for your heart is good for your brain  Avoid high-salt foods   Sleep: aim for at least 7-8 hours per night   Avoid alcohol and medications like Benadryl (Tylenol PM) or other sedating drugs  Stress management/mindfulness practice:   Talk with our social workers about finding a cognitive behavioral therapists  Try a smart phone keegan like “[employer]” or “mindfulness for beginners”   Try “curable” for pain    Take a course in Mindfulness Based Stress Reduction (MBSR)   Collin munson  Read or listen to an audiobook about it:  Mindfulness for beginners   10% happier  The happiness advantage   Social engagement:   Stay in touch with family and friends   Plan a few specific activities for your social health every week   Join a local support group   Volunteer! www [email or web address] or call [phone]     MIND diet score:  1 point for each component      Green leafy vegetables: at least 6 per week  Other vegetable: at least 1 per day   Berries: at least 2 per week  Red meat: fewer than 4 per week   Fish: at least 1 per week   Poultry: at least 2 per week  Beans: at least 3 per week  Nuts: at least 5 per week  Fast or fried food: less than 1 per week  Olive oil   Butter less: less than 1 table-spoon per day   Cheese: less than 1 serving per week   Pastries/sweets: less than 5 servings per week  Alcohol: no more than 1 serving/ day

## 2023-03-14 DIAGNOSIS — J31.0 CHRONIC RHINITIS: ICD-10-CM

## 2023-03-14 DIAGNOSIS — K21.9 GASTROESOPHAGEAL REFLUX DISEASE, UNSPECIFIED WHETHER ESOPHAGITIS PRESENT: Primary | ICD-10-CM

## 2023-03-14 RX ORDER — FAMOTIDINE 20 MG/1
TABLET, FILM COATED ORAL
Qty: 60 TABLET | Refills: 5 | Status: SHIPPED | OUTPATIENT
Start: 2023-03-14

## 2023-03-14 RX ORDER — IPRATROPIUM BROMIDE 42 UG/1
SPRAY, METERED NASAL
Qty: 15 ML | Refills: 3 | Status: SHIPPED | OUTPATIENT
Start: 2023-03-14

## 2023-03-14 NOTE — TELEPHONE ENCOUNTER
Please advise if these meds are ok to refill  They are discontinued  Patient was seen in office 02/23

## 2023-03-20 ENCOUNTER — OFFICE VISIT (OUTPATIENT)
Dept: OBGYN CLINIC | Facility: HOSPITAL | Age: 73
End: 2023-03-20

## 2023-03-20 VITALS
DIASTOLIC BLOOD PRESSURE: 87 MMHG | HEIGHT: 66 IN | HEART RATE: 75 BPM | SYSTOLIC BLOOD PRESSURE: 148 MMHG | WEIGHT: 187 LBS | BODY MASS INDEX: 30.05 KG/M2

## 2023-03-20 DIAGNOSIS — M25.562 CHRONIC PAIN OF LEFT KNEE: ICD-10-CM

## 2023-03-20 DIAGNOSIS — G89.29 CHRONIC PAIN OF LEFT KNEE: ICD-10-CM

## 2023-03-20 DIAGNOSIS — M17.12 PRIMARY OSTEOARTHRITIS OF LEFT KNEE: Primary | ICD-10-CM

## 2023-03-20 RX ORDER — BUPIVACAINE HYDROCHLORIDE 2.5 MG/ML
2 INJECTION, SOLUTION INFILTRATION; PERINEURAL
Status: COMPLETED | OUTPATIENT
Start: 2023-03-20 | End: 2023-03-20

## 2023-03-20 RX ORDER — TRIAMCINOLONE ACETONIDE 40 MG/ML
40 INJECTION, SUSPENSION INTRA-ARTICULAR; INTRAMUSCULAR
Status: COMPLETED | OUTPATIENT
Start: 2023-03-20 | End: 2023-03-20

## 2023-03-20 RX ADMIN — TRIAMCINOLONE ACETONIDE 40 MG: 40 INJECTION, SUSPENSION INTRA-ARTICULAR; INTRAMUSCULAR at 13:20

## 2023-03-20 RX ADMIN — BUPIVACAINE HYDROCHLORIDE 2 ML: 2.5 INJECTION, SOLUTION INFILTRATION; PERINEURAL at 13:20

## 2023-03-20 NOTE — PROGRESS NOTES
Assessment:   Diagnosis ICD-10-CM Associated Orders   1  Chronic pain of left knee  M25 [id number] Ambulatory Referral to Orthopedic Surgery    G89 [id number]           Plan:  •     To do next visit:  Return for follow up 4-6 weeks left knee  The above stated was discussed in layman's terms and the patient expressed understanding  All questions were answered to the patient's satisfaction  Scribe Attestation    I,:  Lynda Diaz am acting as a scribe while in the presence of the attending physician :       I,:  Uvaldo Ashraf MD personally performed the services described in this documentation    as scribed in my presence :             Subjective:   Dewey Valencia  is a 67 y o  male who presents today for consultation for his left knee pain referred by his PCP, Dr Cyndi Green  He reports that the knee progressively getting painful over the last 2 weeks  He reports cracking with weight bearing  He reports he has dull aching pain int he medial aspect of the knee  He reports that 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 [employer]  He has tried Tylenol as needed  Review of systems negative unless otherwise specified in HPI  Review of Systems   Constitutional: Negative for chills, fever and unexpected weight change  HENT: Negative for hearing loss, nosebleeds and sore throat  Eyes: Negative for pain, redness and visual disturbance  Respiratory: Negative for cough, shortness of breath and wheezing  Cardiovascular: Negative for chest pain, palpitations and leg swelling  Gastrointestinal: Negative for abdominal pain, nausea and vomiting  Endocrine: Negative for polydipsia and polyuria  Genitourinary: Negative for dysuria and hematuria  Musculoskeletal: Positive for arthralgias  Skin: Negative for rash and wound  Neurological: Negative for dizziness, light-headedness and headaches     Psychiatric/Behavioral: Negative for decreased concentration, dysphoric mood and suicidal ideas  The patient is not nervous/anxious          Past Medical History:   Diagnosis Date   • Allergic rhinitis    • BPH (benign prostatic hyperplasia)    • GERD (gastroesophageal reflux disease)    • HL (hearing loss)     Wears hearing aids   • Hyperlipidemia    • Inguinal hernia of left side with obstruction    • Nephrolithiasis    • Tinnitus        Past Surgical History:   Procedure Laterality Date   • ADENOIDECTOMY     • INGUINAL HERNIA REPAIR     • UT RPR 1ST INGUN HRNA AGE 5 YRS/> REDUCIBLE Left 10/14/2021    Procedure: REPAIR HERNIA INGUINAL;  Surgeon: Igor Terry MD;  Location: [facility];  Service: General   • TONSILLECTOMY     • WISDOM TOOTH EXTRACTION         Family History   Problem Relation Age of Onset   • Skin cancer Father        Social History     Occupational History   • Not on file   Tobacco Use   • Smoking status: Never   • Smokeless tobacco: Never   Vaping Use   • Vaping Use: Never used   Substance and Sexual Activity   • Alcohol use: No   • Drug use: No   • Sexual activity: Not on file         Current Outpatient Medications:   •  Calcium Carb-Cholecalciferol 600-800 MG-UNIT TABS, Take by mouth daily , Disp: , Rfl:   •  ezetimibe (ZETIA) 10 mg tablet, Take 1 tablet (10 mg total) by mouth daily, Disp: 90 tablet, Rfl: 3  •  famotidine (PEPCID) 20 mg tablet, Take 1 tab  twice daily before meals, Disp: 60 tablet, Rfl: 5  •  fenofibrate (Lofibra) 160 MG tablet, Take 1 tablet (160 mg total) by mouth daily, Disp: 90 tablet, Rfl: 3  •  Glucosamine-Chondroitin--400-375 MG TABS, Take by mouth daily , Disp: , Rfl:   •  ipratropium (ATROVENT) 0 06 % nasal spray, Use 2 prays in each nostril twice daily, Disp: 15 mL, Rfl: 3  •  multivitamin (THERAGRAN) TABS, Take by mouth, Disp: , Rfl:     Allergies   Allergen Reactions   • Other Allergic Rhinitis     Environmental            Vitals:    03/20/23 1253   BP: 148/87   Pulse: 75       Objective:                    Left Knee Exam     Tenderness The patient is experiencing tenderness in the medial joint line and lateral joint line  Range of Motion   Extension: 0   Flexion: 120     Tests   Varus: negative Valgus: negative  Drawer:  Anterior - negative         Other   Erythema: absent  Sensation: normal  Pulse: present  Swelling: none  Effusion: no effusion present    Comments:  Calf is soft and nontender            Diagnostics, reviewed and taken today if performed as documented:    None performed      The attending physician has personally reviewed the pertinent films in PACS and interpretation is as follows:  X-rays of the left knee 3 views reviewed from 3/3/2023: Moderate medial compartment osteoarthritis    Procedures, if performed today:    Large joint arthrocentesis: L knee  Universal Protocol:  Consent: Verbal consent obtained  Risks and benefits: risks, benefits and alternatives were discussed  Consent given by: patient  Time out: Immediately prior to procedure a "time out" was called to verify the correct patient, procedure, equipment, support staff and site/side marked as required  Timeout called at: 3/20/2023 1:16 PM   Patient understanding: patient states understanding of the procedure being performed  Site marked: the operative site was marked  Patient identity confirmed: verbally with patient    Supporting Documentation  Indications: pain   Procedure Details  Location: knee - L knee  Preparation: Patient was prepped and draped in the usual sterile fashion  Needle size: 22 G  Ultrasound guidance: no  Approach: anterolateral  Medications administered: 2 mL bupivacaine 0 25 %; 40 mg triamcinolone acetonide 40 mg/mL    Patient tolerance: patient tolerated the procedure well with no immediate complications  Dressing:  Sterile dressing applied          Portions of the record may have been created with voice recognition software    Occasional wrong word or "sound a like" substitutions may have occurred due to the inherent limitations of voice recognition software  Read the chart carefully and recognize, using context, where substitutions have occurred

## 2023-03-22 ENCOUNTER — APPOINTMENT (OUTPATIENT)
Dept: LAB | Facility: CLINIC | Age: 73
End: 2023-03-22

## 2023-03-22 DIAGNOSIS — E83.52 HYPERCALCEMIA: ICD-10-CM

## 2023-03-22 DIAGNOSIS — R41.3 MEMORY DIFFICULTY: ICD-10-CM

## 2023-03-22 LAB
ANION GAP SERPL CALCULATED.3IONS-SCNC: 3 MMOL/L (ref 4–13)
B BURGDOR IGG+IGM SER-ACNC: 0.3 AI
BUN SERPL-MCNC: 23 MG/DL (ref 5–25)
CALCIUM SERPL-MCNC: 9.5 MG/DL (ref 8.3–10.1)
CHLORIDE SERPL-SCNC: 108 MMOL/L (ref 96–108)
CO2 SERPL-SCNC: 28 MMOL/L (ref 21–32)
CREAT SERPL-MCNC: 1.25 MG/DL (ref 0.6–1.3)
GFR SERPL CREATININE-BSD FRML MDRD: 57 ML/MIN/1.73SQ M
GLUCOSE SERPL-MCNC: 105 MG/DL (ref 65–140)
POTASSIUM SERPL-SCNC: 3.7 MMOL/L (ref 3.5–5.3)
PTH-INTACT SERPL-MCNC: 19.5 PG/ML (ref 18.4–80.1)
SODIUM SERPL-SCNC: 139 MMOL/L (ref 135–147)
TSH SERPL DL<=0.05 MIU/L-ACNC: 1.78 UIU/ML (ref 0.45–4.5)
VIT B12 SERPL-MCNC: 481 PG/ML (ref 100–900)

## 2023-03-30 DIAGNOSIS — K21.9 GASTROESOPHAGEAL REFLUX DISEASE, UNSPECIFIED WHETHER ESOPHAGITIS PRESENT: ICD-10-CM

## 2023-03-30 DIAGNOSIS — J31.0 CHRONIC RHINITIS: ICD-10-CM

## 2023-03-30 RX ORDER — FAMOTIDINE 20 MG/1
TABLET, FILM COATED ORAL
Qty: 180 TABLET | Refills: 1 | Status: SHIPPED | OUTPATIENT
Start: 2023-03-30

## 2023-03-30 RX ORDER — IPRATROPIUM BROMIDE 42 UG/1
SPRAY, METERED NASAL
Qty: 45 ML | Refills: 1 | Status: SHIPPED | OUTPATIENT
Start: 2023-03-30

## 2023-03-31 ENCOUNTER — HOSPITAL ENCOUNTER (OUTPATIENT)
Dept: RADIOLOGY | Facility: HOSPITAL | Age: 73
Discharge: HOME | End: 2023-03-31

## 2023-03-31 DIAGNOSIS — R41.3 MEMORY DIFFICULTY: ICD-10-CM

## 2023-04-14 ENCOUNTER — TELEPHONE (OUTPATIENT)
Dept: NEUROLOGY | Facility: CLINIC | Age: 73
End: 2023-04-14

## 2023-04-14 NOTE — TELEPHONE ENCOUNTER
Left detailed message (ok per consent on file) informing pt of Emani's review of MRI results. Advised pt to call if any questions.

## 2023-04-14 NOTE — TELEPHONE ENCOUNTER
----- Message from ANNITA Kruger sent at 4/5/2023  8:10 AM EDT -----  Please call and let Eddie know that his brain looks healthy! NeuroQuant analysis does not support neurodegeneration. The mild chronic microvascular ischemic change noted in the MRI report is just small vessel changes that occur as we age and with conditions such as hypertension, high cholesterol, etc. Nothing needed treatment wise, just healthy diet, exercise, and control of those conditions.

## 2023-04-24 ENCOUNTER — OFFICE VISIT (OUTPATIENT)
Dept: OBGYN CLINIC | Facility: HOSPITAL | Age: 73
End: 2023-04-24

## 2023-04-24 VITALS
HEIGHT: 66 IN | WEIGHT: 189.4 LBS | DIASTOLIC BLOOD PRESSURE: 85 MMHG | SYSTOLIC BLOOD PRESSURE: 128 MMHG | BODY MASS INDEX: 30.44 KG/M2 | HEART RATE: 82 BPM

## 2023-04-24 DIAGNOSIS — G89.29 CHRONIC PAIN OF LEFT KNEE: ICD-10-CM

## 2023-04-24 DIAGNOSIS — M17.12 PRIMARY OSTEOARTHRITIS OF LEFT KNEE: Primary | ICD-10-CM

## 2023-04-24 DIAGNOSIS — M81.0 AGE-RELATED OSTEOPOROSIS WITHOUT CURRENT PATHOLOGICAL FRACTURE: ICD-10-CM

## 2023-04-24 DIAGNOSIS — M25.562 CHRONIC PAIN OF LEFT KNEE: ICD-10-CM

## 2023-04-24 NOTE — PROGRESS NOTES
"Assessment:   Diagnosis ICD-10-CM Associated Orders   1  Primary osteoarthritis of left knee  M17 12 DXA bone density spine hip and pelvis     Injection Procedure Prior Authorization      2  Age-related osteoporosis without current pathological fracture  M81 0 DXA bone density spine hip and pelvis      3  Chronic pain of left knee  M25 562 Injection Procedure Prior Authorization    G89 29           Plan:  · Because the patient's CSI did not provide satisfactory pain relief, the patient dayan like to try a Visco series  Prior authorization was ordered at today's visit  · PT has failed conservative therapy? Yes  · NSAIDS? Yes  · Tylenol? Yes  · Home exercises/Physical Therapy? Home exercises  · Weight Loss/Assistive devices? Does attend to diet  · PT has failed or has contraindication to a corticosteroid injection therapy? Yes   · PT is symptomatic (pain that interferes with ADL's, sleep, crepitus, or knee stiffness? Yes  · PT pain score? Currently a 5/10 4 weeks s/p left knee CSI; was 8-9/10 prior to injection  · The patient is concerned about osteoporosis  DXA bone scan has been ordered  · The physician was consulted and was instrumental in the formulation of the plan  To do next visit:  Follow-up for Visco supplementation left knee  The above stated was discussed in layman's terms and the patient expressed understanding  All questions were answered to the patient's satisfaction  Subjective:   Ernesto Pulido  is a 67 y o  male who presents to the office today for a follow-up appointment for his left knee pain and osteoarthritis  He underwent CSI of the left knee approximately 1 month ago on 3/20/23  He states that the injection provided maybe \"75%\" improvement, but he does still have considerable pain and stiffness with first waking up and and after prolonged periods of sitting  He states his pain now is a 5/10, but was closer to an 8-9/10 prior to the CSI    OTC medications do not " provide adequate relief        Review of systems negative unless otherwise specified in HPI    Past Medical History:   Diagnosis Date   • Allergic rhinitis    • BPH (benign prostatic hyperplasia)    • GERD (gastroesophageal reflux disease)    • HL (hearing loss)     Wears hearing aids   • Hyperlipidemia    • Inguinal hernia of left side with obstruction    • Nephrolithiasis    • Tinnitus        Past Surgical History:   Procedure Laterality Date   • ADENOIDECTOMY     • INGUINAL HERNIA REPAIR     • [place] RPR 1ST INGUN HRNA AGE 5 YRS/> REDUCIBLE Left 10/14/2021    Procedure: REPAIR HERNIA INGUINAL;  Surgeon: Giovanna Raymond MD;  Location: [facility];  Service: General   • TONSILLECTOMY     • WISDOM TOOTH EXTRACTION         Family History   Problem Relation Age of Onset   • Skin cancer Father        Social History     Occupational History   • Not on file   Tobacco Use   • Smoking status: Never   • Smokeless tobacco: Never   Vaping Use   • Vaping Use: Never used   Substance and Sexual Activity   • Alcohol use: No   • Drug use: No   • Sexual activity: Not on file         Current Outpatient Medications:   •  Calcium Carb-Cholecalciferol 600-800 MG-UNIT TABS, Take by mouth daily , Disp: , Rfl:   •  ezetimibe (ZETIA) 10 mg tablet, Take 1 tablet (10 mg total) by mouth daily, Disp: 90 tablet, Rfl: 3  •  famotidine (PEPCID) 20 mg tablet, Take 1 tab  twice daily before meals, Disp: 180 tablet, Rfl: 1  •  fenofibrate (Lofibra) 160 MG tablet, Take 1 tablet (160 mg total) by mouth daily, Disp: 90 tablet, Rfl: 3  •  Glucosamine-Chondroitin--400-375 MG TABS, Take by mouth daily , Disp: , Rfl:   •  ipratropium (ATROVENT) 0 06 % nasal spray, Use 2 prays in each nostril twice daily, Disp: 45 mL, Rfl: 1  •  multivitamin (THERAGRAN) TABS, Take by mouth, Disp: , Rfl:     Allergies   Allergen Reactions   • Other Allergic Rhinitis     Environmental            Vitals:    04/24/23 1300   BP: 128/85   Pulse: 82 "      Objective:    General:  Patient is WDWN, alert and oriented, appears stated age, and is in no acute distress  Musculoskeletal:    Left Knee: Inspection:  The knee is without erythema or purulence indicative of infection  No significant edema  Range of Motion:  The patient is able to achieve full active extension and approximately 120 degrees of knee flexion  Palpation:  He is tender over the medial joint line  There is palpable crepitation over the patella  Special Tests:  Ballottement test negative for significant effusion  Diagnostics, reviewed and taken today if performed as documented:    None performed        Procedures, if performed today:    None performed      Portions of the record may have been created with voice recognition software  Occasional wrong word or \"sound a like\" substitutions may have occurred due to the inherent limitations of voice recognition software  Read the chart carefully and recognize, using context, where substitutions have occurred    "

## 2023-04-26 PROBLEM — Z00.00 MEDICARE ANNUAL WELLNESS VISIT, SUBSEQUENT: Status: RESOLVED | Noted: 2019-12-22 | Resolved: 2023-04-26

## 2023-05-01 ENCOUNTER — HOSPITAL ENCOUNTER (OUTPATIENT)
Dept: BONE DENSITY | Facility: MEDICAL CENTER | Age: 73
Discharge: HOME | End: 2023-05-01

## 2023-05-01 DIAGNOSIS — M17.12 PRIMARY OSTEOARTHRITIS OF LEFT KNEE: ICD-10-CM

## 2023-05-01 DIAGNOSIS — M81.0 AGE-RELATED OSTEOPOROSIS WITHOUT CURRENT PATHOLOGICAL FRACTURE: ICD-10-CM

## 2023-05-02 ENCOUNTER — PROCEDURE VISIT (OUTPATIENT)
Dept: OBGYN CLINIC | Facility: HOSPITAL | Age: 73
End: 2023-05-02

## 2023-05-02 VITALS
SYSTOLIC BLOOD PRESSURE: 143 MMHG | WEIGHT: 190.2 LBS | BODY MASS INDEX: 30.57 KG/M2 | HEART RATE: 54 BPM | DIASTOLIC BLOOD PRESSURE: 84 MMHG | HEIGHT: 66 IN

## 2023-05-02 DIAGNOSIS — M17.12 PRIMARY OSTEOARTHRITIS OF LEFT KNEE: Primary | ICD-10-CM

## 2023-05-02 NOTE — PROGRESS NOTES
Large joint arthrocentesis: L knee  Universal Protocol:  Consent: Verbal consent obtained    Consent given by: patient  Timeout called at: 5/2/2023 9:52 AM   Patient understanding: patient states understanding of the procedure being performed  Patient identity confirmed: verbally with patient    Supporting Documentation  Indications: pain   Procedure Details  Location: knee - L knee  Needle size: 22 G  Ultrasound guidance: no  Approach: anterolateral  Medications administered: 25 mg sodium hyaluronate 25 MG/2 5ML

## 2023-05-09 ENCOUNTER — PROCEDURE VISIT (OUTPATIENT)
Dept: OBGYN CLINIC | Facility: HOSPITAL | Age: 73
End: 2023-05-09

## 2023-05-09 VITALS
BODY MASS INDEX: 30.53 KG/M2 | WEIGHT: 190 LBS | DIASTOLIC BLOOD PRESSURE: 86 MMHG | HEART RATE: 66 BPM | SYSTOLIC BLOOD PRESSURE: 131 MMHG | HEIGHT: 66 IN

## 2023-05-09 DIAGNOSIS — M25.562 CHRONIC PAIN OF LEFT KNEE: ICD-10-CM

## 2023-05-09 DIAGNOSIS — G89.29 CHRONIC PAIN OF LEFT KNEE: ICD-10-CM

## 2023-05-09 DIAGNOSIS — M17.12 PRIMARY OSTEOARTHRITIS OF LEFT KNEE: Primary | ICD-10-CM

## 2023-05-09 NOTE — PROGRESS NOTES
Assessment:   Diagnosis ICD-10-CM Associated Orders   1  Primary osteoarthritis of left knee  M17 12 Large joint arthrocentesis: L knee      2  Chronic pain of left knee  M25 562 Large joint arthrocentesis: L knee    G89 29           Plan:  · The patient underwent his second in a 3-shot series of left knee Visco-3 injections  The procedure was tolerated well without any immediate complications  He was monitored for dizziness for a few minutes after the injection  · I advised that rest, ice, compression, and elevation of the left knee may be helpful for the next 24 hours  · He may do activities as tolerated  · He should return next week for his final left knee injection  · The physician was consulted and was in agreement with the plan  To do next visit:  Follow-up in 1 week for third and final left knee Visco-3 injection  The above stated was discussed in layman's terms and the patient expressed understanding  All questions were answered to the patient's satisfaction  Subjective:   Tavares Maldonado  is a 67 y o  male who presents to the office today for his second in a 3-shot series of left knee Visco-3 injections  Last week he had some dizziness after his shot but no other issues  He is ready for further injection at this time        Review of systems negative unless otherwise specified in HPI    Past Medical History:   Diagnosis Date   • Allergic rhinitis    • BPH (benign prostatic hyperplasia)    • GERD (gastroesophageal reflux disease)    • HL (hearing loss)     Wears hearing aids   • Hyperlipidemia    • Inguinal hernia of left side with obstruction    • Nephrolithiasis    • Tinnitus        Past Surgical History:   Procedure Laterality Date   • ADENOIDECTOMY     • INGUINAL HERNIA REPAIR     • [place] RPR 1ST INGUN HRNA AGE 5 YRS/> REDUCIBLE Left 10/14/2021    Procedure: REPAIR HERNIA INGUINAL;  Surgeon: Angela Dumont MD;  Location: [facility];  Service: General   • TONSILLECTOMY     • "WISDOM TOOTH EXTRACTION         Family History   Problem Relation Age of Onset   • Skin cancer Father        Social History     Occupational History   • Not on file   Tobacco Use   • Smoking status: Never   • Smokeless tobacco: Never   Vaping Use   • Vaping Use: Never used   Substance and Sexual Activity   • Alcohol use: No   • Drug use: No   • Sexual activity: Not on file         Current Outpatient Medications:   •  Calcium Carb-Cholecalciferol 600-800 MG-UNIT TABS, Take by mouth daily , Disp: , Rfl:   •  ezetimibe (ZETIA) 10 mg tablet, Take 1 tablet (10 mg total) by mouth daily, Disp: 90 tablet, Rfl: 3  •  famotidine (PEPCID) 20 mg tablet, Take 1 tab  twice daily before meals, Disp: 180 tablet, Rfl: 1  •  fenofibrate (Lofibra) 160 MG tablet, Take 1 tablet (160 mg total) by mouth daily, Disp: 90 tablet, Rfl: 3  •  Glucosamine-Chondroitin--400-375 MG TABS, Take by mouth daily , Disp: , Rfl:   •  ipratropium (ATROVENT) 0 06 % nasal spray, Use 2 prays in each nostril twice daily, Disp: 45 mL, Rfl: 1  •  multivitamin (THERAGRAN) TABS, Take by mouth, Disp: , Rfl:     Allergies   Allergen Reactions   • Other Allergic Rhinitis     Environmental            Vitals:    05/09/23 0948   BP: 131/86   Pulse: 66       Objective:    General:  Patient is WDWN, alert and oriented, appears stated age, and is in no acute distress  Musculoskeletal:    Left Knee: Inspection:  The knee is without erythema or purulence indicative of infection which would preclude the pateiie from receiving a joint injection at today's visit  Diagnostics, reviewed and taken today if performed as documented:    None performed        Procedures, if performed today:    Large joint arthrocentesis: L knee  Universal Protocol:  Consent given by: patient  Time out: Immediately prior to procedure a \"time out\" was called to verify the correct patient, procedure, equipment, support staff and site/side marked as required    Patient understanding: " "patient states understanding of the procedure being performed  Patient consent: the patient's understanding of the procedure matches consent given  Procedure consent: procedure consent matches procedure scheduled  Site marked: the operative site was marked  Patient identity confirmed: verbally with patient    Supporting Documentation  Indications: pain   Procedure Details  Location: knee - L knee  Preparation: Patient was prepped and draped in the usual sterile fashion  Needle size: 22 G  Ultrasound guidance: no  Approach: anteromedial  Medications administered: 25 mg sodium hyaluronate 25 MG/2 5ML    Patient tolerance: patient tolerated the procedure well with no immediate complications  Dressing:  Sterile dressing applied        Portions of the record may have been created with voice recognition software  Occasional wrong word or \"sound a like\" substitutions may have occurred due to the inherent limitations of voice recognition software  Read the chart carefully and recognize, using context, where substitutions have occurred    "

## 2023-05-16 ENCOUNTER — PROCEDURE VISIT (OUTPATIENT)
Dept: OBGYN CLINIC | Facility: HOSPITAL | Age: 73
End: 2023-05-16

## 2023-05-16 VITALS
HEART RATE: 75 BPM | SYSTOLIC BLOOD PRESSURE: 134 MMHG | WEIGHT: 190 LBS | BODY MASS INDEX: 30.53 KG/M2 | DIASTOLIC BLOOD PRESSURE: 91 MMHG | HEIGHT: 66 IN

## 2023-05-16 DIAGNOSIS — M25.562 CHRONIC PAIN OF LEFT KNEE: ICD-10-CM

## 2023-05-16 DIAGNOSIS — M17.12 PRIMARY OSTEOARTHRITIS OF LEFT KNEE: Primary | ICD-10-CM

## 2023-05-16 DIAGNOSIS — G89.29 CHRONIC PAIN OF LEFT KNEE: ICD-10-CM

## 2023-05-16 NOTE — PROGRESS NOTES
Assessment:   Diagnosis ICD-10-CM Associated Orders   1  Primary osteoarthritis of left knee  M17 12 Large joint arthrocentesis: L knee      2  Chronic pain of left knee  M25 562 Large joint arthrocentesis: L knee    G89 29           Plan:  • The patient underwent his third in a 3-shot series of left knee Visco-3 injections  The procedure was tolerated well without any immediate complications  He was monitored for dizziness for a few minutes after the injection  • I advised that rest, ice, compression, and elevation of the left knee may be helpful for the next 24 hours  • He may do activities as tolerated  • The results of his DXA bone scan were discussed at length with the patient  • We discussed knee replacement surgery  He would like to think about this  To do next visit:  Follow-up in 3 months for potential CSI  The above stated was discussed in layman's terms and the patient expressed understanding  All questions were answered to the patient's satisfaction  Subjective:   Tavares Maldonado  is a 67 y o  male who presents to the office today for his third in a 3-shot series of left knee Visco-3 injections  2 weeks ago, he had some dizziness after his shot but no other issues  He gets very nervous about needles  He would like to review his DXA bone scan  He is ready for further injection at this time        Review of systems negative unless otherwise specified in HPI    Past Medical History:   Diagnosis Date   • Allergic rhinitis    • BPH (benign prostatic hyperplasia)    • GERD (gastroesophageal reflux disease)    • HL (hearing loss)     Wears hearing aids   • Hyperlipidemia    • Inguinal hernia of left side with obstruction    • Nephrolithiasis    • Tinnitus        Past Surgical History:   Procedure Laterality Date   • ADENOIDECTOMY     • INGUINAL HERNIA REPAIR     • [place] RPR 1ST INGUN HRNA AGE 5 YRS/> REDUCIBLE Left 10/14/2021    Procedure: REPAIR HERNIA INGUINAL;  Surgeon: Dolores Anderson Irving Kathleen MD;  Location: [facility];  Service: General   • TONSILLECTOMY     • WISDOM TOOTH EXTRACTION         Family History   Problem Relation Age of Onset   • Skin cancer Father        Social History     Occupational History   • Not on file   Tobacco Use   • Smoking status: Never   • Smokeless tobacco: Never   Vaping Use   • Vaping Use: Never used   Substance and Sexual Activity   • Alcohol use: No   • Drug use: No   • Sexual activity: Not on file         Current Outpatient Medications:   •  Calcium Carb-Cholecalciferol 600-800 MG-UNIT TABS, Take by mouth daily , Disp: , Rfl:   •  ezetimibe (ZETIA) 10 mg tablet, Take 1 tablet (10 mg total) by mouth daily, Disp: 90 tablet, Rfl: 3  •  famotidine (PEPCID) 20 mg tablet, Take 1 tab  twice daily before meals, Disp: 180 tablet, Rfl: 1  •  fenofibrate (Lofibra) 160 MG tablet, Take 1 tablet (160 mg total) by mouth daily, Disp: 90 tablet, Rfl: 3  •  Glucosamine-Chondroitin--400-375 MG TABS, Take by mouth daily , Disp: , Rfl:   •  ipratropium (ATROVENT) 0 06 % nasal spray, Use 2 prays in each nostril twice daily, Disp: 45 mL, Rfl: 1  •  multivitamin (THERAGRAN) TABS, Take by mouth, Disp: , Rfl:     Allergies   Allergen Reactions   • Other Allergic Rhinitis     Environmental            Vitals:    05/16/23 1042   BP: 134/91   Pulse: 75       Objective:    General:  Patient is WDWN, alert and oriented, appears stated age, and is in no acute distress      Musculoskeletal:     Left Knee:     Inspection:  The knee is without erythema or purulence indicative of infection which would preclude the pateiie from receiving a joint injection at today's visit  Diagnostics, reviewed and taken today if performed as documented:    None performed        Procedures, if performed today:    Large joint arthrocentesis: L knee  Universal Protocol:  Consent: Verbal consent obtained    Consent given by: patient  Time out: Immediately prior to procedure a \"time out\" was called to " "verify the correct patient, procedure, equipment, support staff and site/side marked as required  Patient understanding: patient states understanding of the procedure being performed  Patient consent: the patient's understanding of the procedure matches consent given  Procedure consent: procedure consent matches procedure scheduled  Site marked: the operative site was marked  Patient identity confirmed: verbally with patient    Supporting Documentation  Indications: pain   Procedure Details  Location: knee - L knee  Preparation: Patient was prepped and draped in the usual sterile fashion  Needle size: 22 G  Ultrasound guidance: no  Approach: anterolateral  Medications administered: 25 mg sodium hyaluronate 25 MG/2 5ML    Patient tolerance: patient tolerated the procedure well with no immediate complications  Dressing:  Sterile dressing applied        Portions of the record may have been created with voice recognition software  Occasional wrong word or \"sound a like\" substitutions may have occurred due to the inherent limitations of voice recognition software  Read the chart carefully and recognize, using context, where substitutions have occurred    "

## 2023-08-21 ENCOUNTER — OFFICE VISIT (OUTPATIENT)
Dept: OBGYN CLINIC | Facility: HOSPITAL | Age: 73
End: 2023-08-21
Payer: MEDICARE

## 2023-08-21 VITALS
WEIGHT: 190 LBS | HEIGHT: 66 IN | HEART RATE: 79 BPM | BODY MASS INDEX: 30.53 KG/M2 | DIASTOLIC BLOOD PRESSURE: 85 MMHG | SYSTOLIC BLOOD PRESSURE: 127 MMHG

## 2023-08-21 DIAGNOSIS — G89.29 CHRONIC PAIN OF LEFT KNEE: Primary | ICD-10-CM

## 2023-08-21 DIAGNOSIS — M17.12 PRIMARY OSTEOARTHRITIS OF LEFT KNEE: ICD-10-CM

## 2023-08-21 DIAGNOSIS — M25.562 CHRONIC PAIN OF LEFT KNEE: Primary | ICD-10-CM

## 2023-08-21 PROCEDURE — 99212 OFFICE O/P EST SF 10 MIN: CPT | Performed by: ORTHOPAEDIC SURGERY

## 2023-08-21 NOTE — PROGRESS NOTES
Assessment:  1. Chronic pain of left knee        2. Primary osteoarthritis of left knee            Plan:  Left knee osteoarthritis. · Radiograph displays severe medial and patellofemoral arthritic changes  · Patient has tried both steroid and visco-supplement with limited benefit  · The idea of total knee arthroplasty was discussed as a reasonable future treatment option and this was discussed in detail with patient and wife  · Medial un- brace discussed as treatment option  · The patient would like to wait at this time to decide on his next step  · Follow up as needed    To do next visit:  Return if symptoms worsen or fail to improve. The above stated was discussed in layman's terms and the patient expressed understanding. All questions were answered to the patient's satisfaction. Scribe Attestation    I,:  José Antonio Angelo am acting as a scribe while in the presence of the attending physician.:       I,:  Rashida Fonseca MD personally performed the services described in this documentation    as scribed in my presence.:             Subjective:   Jazmin Kothari is a 67 y.o. male who presents for follow up of left knee. He is s/p left knee visco-supplement with limited short-lived benefit. Today he complains of left medial and anteromedial knee pain. He rates his pain at 1/10 and greater at times and quick 5/10 shooting. He denies medications for this issue. He has had steroid injection in past with limited benefit. He denies past knee surgery.         Review of systems negative unless otherwise specified in HPI    Past Medical History:   Diagnosis Date   • Allergic rhinitis    • BPH (benign prostatic hyperplasia)    • GERD (gastroesophageal reflux disease)    • HL (hearing loss)     Wears hearing aids   • Hyperlipidemia    • Inguinal hernia of left side with obstruction    • Nephrolithiasis    • Tinnitus        Past Surgical History:   Procedure Laterality Date   • ADENOIDECTOMY     • INGUINAL HERNIA REPAIR     • [place] RPR 1ST INGUN HRNA AGE 5 YRS/> REDUCIBLE Left 10/14/2021    Procedure: REPAIR HERNIA INGUINAL;  Surgeon: Trenton Wilson MD;  Location: [facility];  Service: General   • TONSILLECTOMY     • WISDOM TOOTH EXTRACTION         Family History   Problem Relation Age of Onset   • Skin cancer Father        Social History     Occupational History   • Not on file   Tobacco Use   • Smoking status: Never   • Smokeless tobacco: Never   Vaping Use   • Vaping Use: Never used   Substance and Sexual Activity   • Alcohol use: No   • Drug use: No   • Sexual activity: Not on file         Current Outpatient Medications:   •  Calcium Carb-Cholecalciferol 600-800 MG-UNIT TABS, Take by mouth daily , Disp: , Rfl:   •  ezetimibe (ZETIA) 10 mg tablet, Take 1 tablet (10 mg total) by mouth daily, Disp: 90 tablet, Rfl: 3  •  famotidine (PEPCID) 20 mg tablet, Take 1 tab. twice daily before meals, Disp: 180 tablet, Rfl: 1  •  Glucosamine-Chondroitin--400-375 MG TABS, Take by mouth daily , Disp: , Rfl:   •  ipratropium (ATROVENT) 0.06 % nasal spray, USE 2 SPRAYS IN EACH NOSTRIL TWICE DAILY, Disp: 75 mL, Rfl: 3  •  multivitamin (THERAGRAN) TABS, Take by mouth, Disp: , Rfl:   •  fenofibrate (Lofibra) 160 MG tablet, Take 1 tablet (160 mg total) by mouth daily, Disp: 90 tablet, Rfl: 3    Allergies   Allergen Reactions   • Other Allergic Rhinitis     Environmental            Vitals:    08/21/23 1331   BP: 127/85   Pulse: 79       Objective:  Physical exam  · General: Awake, Alert, Oriented  · Eyes: Pupils equal, round and reactive to light  · Heart: regular rate and rhythm  · Lungs: No audible wheezing  · Abdomen: soft                    Ortho Exam  Left knee:  Varus alignment  No erythema or ecchymosis  No effusion or swelling  Normal strength  Good ROM with crepitus   Calf compartments soft and supple  Sensation intact  Toes are warm sensate and mobile      Diagnostics, reviewed and taken today if performed as documented: The attending physician has personally reviewed the pertinent films in PACS and interpretation is as follows:  Left knee x-ray of 3/3/2023:  Severe medial and moderate patellofemoral arthritic changes with medial bone on bone apposition. Procedures, if performed today:    Procedures    None performed      Portions of the record may have been created with voice recognition software. Occasional wrong word or "sound a like" substitutions may have occurred due to the inherent limitations of voice recognition software. Read the chart carefully and recognize, using context, where substitutions have occurred.

## 2023-08-30 DIAGNOSIS — K21.9 GASTROESOPHAGEAL REFLUX DISEASE, UNSPECIFIED WHETHER ESOPHAGITIS PRESENT: ICD-10-CM

## 2023-08-30 RX ORDER — FAMOTIDINE 20 MG/1
TABLET, FILM COATED ORAL
Qty: 180 TABLET | Refills: 1 | Status: SHIPPED | OUTPATIENT
Start: 2023-08-30

## 2024-01-15 DIAGNOSIS — E78.2 MIXED HYPERLIPIDEMIA: ICD-10-CM

## 2024-01-15 RX ORDER — FENOFIBRATE 160 MG/1
160 TABLET ORAL DAILY
Qty: 90 TABLET | Refills: 3 | Status: SHIPPED | OUTPATIENT
Start: 2024-01-15

## 2024-01-15 RX ORDER — EZETIMIBE 10 MG/1
10 TABLET ORAL DAILY
Qty: 90 TABLET | Refills: 3 | Status: SHIPPED | OUTPATIENT
Start: 2024-01-15

## 2024-02-12 ENCOUNTER — OFFICE VISIT (OUTPATIENT)
Dept: OBGYN CLINIC | Facility: OTHER | Age: 74
End: 2024-02-12
Payer: MEDICARE

## 2024-02-12 ENCOUNTER — APPOINTMENT (OUTPATIENT)
Dept: RADIOLOGY | Facility: OTHER | Age: 74
End: 2024-02-12
Payer: MEDICARE

## 2024-02-12 VITALS
WEIGHT: 190 LBS | BODY MASS INDEX: 30.53 KG/M2 | HEART RATE: 61 BPM | HEIGHT: 66 IN | DIASTOLIC BLOOD PRESSURE: 87 MMHG | SYSTOLIC BLOOD PRESSURE: 154 MMHG

## 2024-02-12 DIAGNOSIS — M76.822 POSTERIOR TIBIAL TENDON DYSFUNCTION (PTTD) OF LEFT LOWER EXTREMITY: ICD-10-CM

## 2024-02-12 DIAGNOSIS — M25.572 ACUTE LEFT ANKLE PAIN: Primary | ICD-10-CM

## 2024-02-12 DIAGNOSIS — M25.572 ACUTE LEFT ANKLE PAIN: ICD-10-CM

## 2024-02-12 PROCEDURE — 73610 X-RAY EXAM OF ANKLE: CPT

## 2024-02-12 PROCEDURE — 99213 OFFICE O/P EST LOW 20 MIN: CPT | Performed by: PHYSICIAN ASSISTANT

## 2024-02-12 NOTE — PROGRESS NOTES
Orthopaedic Surgery - Office Note  Eddie Perez Jr. (73 y.o. male)   : 1950   MRN: [id number]  Encounter Date: 2024    Chief Complaint   Patient presents with    Left Ankle - Pain         Assessment/Plan  Diagnoses and all orders for this visit:    Acute left ankle pain  -     XR ankle 3+ vw left; Future  -     Durable Medical Equipment  -     Ambulatory Referral to Physical Therapy; Future  -     Ambulatory Referral to Podiatry; Future    Posterior tibial tendon dysfunction (PTTD) of left lower extremity  -     Durable Medical Equipment  -     Ambulatory Referral to Physical Therapy; Future  -     Ambulatory Referral to Podiatry; Future    The diagnosis as well as treatment options were reviewed with the patient in the office today.  I explained the posterior tibial tendon dysfunction.  I would recommend initial conservative treatment of short period of immobilization and protection in a high tide boot to be used when ambulating any length or distance.  I would recommend avoiding any type of barefoot walking.  I encouraged arch supports over-the-counter when he progresses to his normal shoes.  He will be started in formal physical therapy for posterior tibial tendon dysfunction and follow-up with podiatry in 2 to 3 weeks.  I reviewed with the patient I cannot rule out a tendon avulsion/rupture but is felt unlikely at this time with lack of trauma and no ecchymosis.  Patient will start Aleve 1 tablet twice daily with food stopping and calling if any stomach upset occurs for the next 10 days.     Return for Recheck in 2 to 3 weeks with podiatry.        History of Present Illness  This is a previous orthopedic patient here for new left ankle pain.  He reports a 3-day history of ankle pain with no known trauma.  Patient locates the pain along the course of the posterior tibial tendon.  He reports that is causing him to limp and have discomfort.  He reports associated meniscal pathology in the left  "knee for which she treats with Dr. Barbour.  He has had no treatment for the ankle pain.  He denies any calf pain or paresthesias.  He reports the ankle never got ecchymotic and there was no known trauma.  Stairs are very difficult for him.    Review of Systems  Pertinent items are noted in HPI.  All other systems were reviewed and are negative.    Physical Exam  /87 (BP Location: Right arm, Patient Position: Sitting, Cuff Size: Large)   Pulse 61   Ht 5' 6\" (1.676 m)   Wt 86.2 kg (190 lb)   BMI 30.67 kg/m²   Cons: Appears well.  No apparent distress.  Psych: Alert. Oriented x3.  Mood and affect normal.    On examination patient's left foot and ankle have no skin breakdown lesion or ecchymosis.  There are no signs of infection or DVT.  He is tender to palpation along the course of the posterior tibial tendon into the arch of his foot.  He has weakness to 4- out of 5 to plantarflexion and is unable to single-leg toe raise.  He is nontender to palpation at the fifth metatarsal, midfoot, medial malleolus, lateral malleolus, ATFL, CFL, deltoid, and peroneal tendon.  Patient has well-maintained left ankle dorsiflexion, plantarflexion, inversion, and eversion.  Dorsalis pedis and posterior tibial pulses are +2.  He is neurovascularly intact.  He has noted pes planus.  He is nontender at the plantar fascia and calcaneus.  He has no tenderness in the Achilles.  He has a negative calf pain and Homans.  Negative Michel's test.    Examination patient's left shoe shows abnormal wear parenting with complete loss of trading on the medial shoe and heel lift shoe only.        Studies Reviewed  X-ray performed in the office today 3 views of the left ankle show no acute fractures or dislocations.  Mild degenerative changes are noted laterally.  Mild degenerative changes are noted in the anterior ankle.  Mortise is intact.  X-rays were reviewed from orthopedic standpoint will await official radiologist " interpretation    Procedures  No procedures today.    Medical, Surgical, Family, and Social History  The patient's medical history, family history, and social history, were reviewed and updated as appropriate.    Past Medical History:   Diagnosis Date    Allergic rhinitis     BPH (benign prostatic hyperplasia)     GERD (gastroesophageal reflux disease)     HL (hearing loss)     Wears hearing aids    Hyperlipidemia     Inguinal hernia of left side with obstruction     Nephrolithiasis     Tinnitus        Past Surgical History:   Procedure Laterality Date    ADENOIDECTOMY      INGUINAL HERNIA REPAIR      [place] RPR 1ST INGUN HRNA AGE 5 YRS/> REDUCIBLE Left 10/14/2021    Procedure: REPAIR HERNIA INGUINAL;  Surgeon: Juan Pereira MD;  Location: [facility];  Service: General    TONSILLECTOMY      WISDOM TOOTH EXTRACTION         Family History   Problem Relation Age of Onset    Skin cancer Father        Social History     Occupational History    Not on file   Tobacco Use    Smoking status: Never    Smokeless tobacco: Never   Vaping Use    Vaping status: Never Used   Substance and Sexual Activity    Alcohol use: No    Drug use: No    Sexual activity: Not on file       Allergies   Allergen Reactions    Other Allergic Rhinitis     Environmental         Current Outpatient Medications:     Calcium Carb-Cholecalciferol 600-800 MG-UNIT TABS, Take by mouth daily , Disp: , Rfl:     ezetimibe (ZETIA) 10 mg tablet, TAKE 1 TABLET EVERY DAY, Disp: 90 tablet, Rfl: 3    famotidine (PEPCID) 20 mg tablet, TAKE 1 TABLET TWICE DAILY BEFORE MEALS, Disp: 180 tablet, Rfl: 1    fenofibrate 160 MG tablet, TAKE 1 TABLET EVERY DAY, Disp: 90 tablet, Rfl: 3    Glucosamine-Chondroitin--400-375 MG TABS, Take by mouth daily , Disp: , Rfl:     ipratropium (ATROVENT) 0.06 % nasal spray, USE 2 SPRAYS IN EACH NOSTRIL TWICE DAILY, Disp: 75 mL, Rfl: 3    multivitamin (THERAGRAN) TABS, Take by mouth, Disp: , Rfl:       Edwin Goodrich PA-C

## 2024-03-04 ENCOUNTER — OFFICE VISIT (OUTPATIENT)
Dept: PODIATRY | Facility: CLINIC | Age: 74
End: 2024-03-04
Payer: MEDICARE

## 2024-03-04 ENCOUNTER — TELEPHONE (OUTPATIENT)
Dept: PODIATRY | Facility: CLINIC | Age: 74
End: 2024-03-04

## 2024-03-04 VITALS
HEIGHT: 66 IN | SYSTOLIC BLOOD PRESSURE: 151 MMHG | HEART RATE: 89 BPM | BODY MASS INDEX: 30.31 KG/M2 | WEIGHT: 188.6 LBS | DIASTOLIC BLOOD PRESSURE: 84 MMHG

## 2024-03-04 DIAGNOSIS — M25.572 ACUTE LEFT ANKLE PAIN: ICD-10-CM

## 2024-03-04 DIAGNOSIS — M76.822 POSTERIOR TIBIAL TENDON DYSFUNCTION (PTTD) OF LEFT LOWER EXTREMITY: ICD-10-CM

## 2024-03-04 DIAGNOSIS — M19.072 DJD (DEGENERATIVE JOINT DISEASE), ANKLE AND FOOT, LEFT: Primary | ICD-10-CM

## 2024-03-04 PROCEDURE — 99213 OFFICE O/P EST LOW 20 MIN: CPT | Performed by: PODIATRIST

## 2024-03-04 NOTE — TELEPHONE ENCOUNTER
My recommendation for over-the-counter inserts for you are:    PowerStep [place] Insoles    These can be obtained directly from the  at [email or web address]    You can also find these online at [employer], [employer] and other retailers.    There are various types within this line of orthotics.  Unless I discussed a specific model with you, the full-length PowerStep [place] insoles for plantar fasciitis and heel pain relief will be good for you to try based on your foot exam.

## 2024-03-04 NOTE — PROGRESS NOTES
Patient was referred by orthopedic surgery for evaluation of left ankle pain.  Patient had 3-day history of ankle pain prior to his last visit.  Diagnosis included posterior tibial tendon dysfunction.  Patient was referred to physical therapy.        X-rays of the left ankle reviewed today which show mild osteoarthritic changes noted osteophytes medial ankle no acute fracture dislocations noted currently.

## 2024-03-04 NOTE — TELEPHONE ENCOUNTER
Caller: Eddie Perez    Doctor: Eduardo Fermin, DPM    Reason for call: At the appointment today, Dr. Fermin suggested a certain kind of insert for Eddie.  What is the name of the insert and what else does he need to know to order them from [employer]    Call back#: [phone]

## 2024-03-04 NOTE — PROGRESS NOTES
Podiatry Clinic Visit  Eddie Perez Jr. 73 y.o. male MRN: [id number]  Encounter: [id number]    Assessment/Plan        Diagnoses and all orders for this visit:    Acute left ankle pain  -     Ambulatory Referral to Podiatry    Posterior tibial tendon dysfunction (PTTD) of left lower extremity  -     Ambulatory Referral to Podiatry         Plan:  Patient was examined, evaluated, and treated with all questions and concerns addressed.  Prior left ankle xrays from 2/12/2024 were reviewed with the patient. mild osteoarthritic changes noted osteophytes medial ankle no acute fracture dislocations noted currently.  Patient was referred by orthopedic surgery for evaluation of left ankle pain.  Patient had 3-day history of ankle pain prior to his last visit.  Diagnosis included posterior tibial tendon dysfunction.    Discussed etiology of posterior tibial tendinitis with patient including treatment options consisting of OTC orthotics, topical anti inflammatory, RICE therapy.  Patient was amenable to this plan.   Recommend OTC inserts to aid in symptom relief and provide arch support while patient is ambulatory.    Should patient's symptoms persist, recommend formal physical therapy and/or advanced imaging.   Patient was advised to follow up as needed or to call the office for anything acute.     - Dr. Fermin  was available/present for entirety of patient encounter and present for all procedures.    History of Present Illness     HPI: Eddie Perez Jr. is a 73 y.o. male who presents with complaint of left foot and ankle pain. Patient reports that he previously saw orthopedics for the same injury about 3 weeks ago. He states he received ankle xrays but does not know the result of them. Patient reports that the pain in his left foot has decreased since his orthopedic visit. He reports he was given a boot but does not wear it as he felt like it was making the pain worse. However, he does report using an ACE wrap  "regularly and notices relief of it.     The patient has no further podiatric complaints at this time.    Review of Systems   Constitutional: Negative.    HENT: Negative.    Eyes: Negative.    Respiratory: Negative.    Cardiovascular: Negative.    Gastrointestinal: Negative.    Musculoskeletal: Left foot pain   Skin: Negative   Neurological: Negative.        Historical Information   Past Medical History:   Diagnosis Date    Allergic rhinitis     BPH (benign prostatic hyperplasia)     GERD (gastroesophageal reflux disease)     HL (hearing loss)     Wears hearing aids    Hyperlipidemia     Inguinal hernia of left side with obstruction     Nephrolithiasis     Tinnitus      Past Surgical History:   Procedure Laterality Date    ADENOIDECTOMY      INGUINAL HERNIA REPAIR      [place] RPR 1ST INGUN HRNA AGE 5 YRS/> REDUCIBLE Left 10/14/2021    Procedure: REPAIR HERNIA INGUINAL;  Surgeon: Juan Pereira MD;  Location: [facility];  Service: General    TONSILLECTOMY      WISDOM TOOTH EXTRACTION       Social History   Social History     Substance and Sexual Activity   Alcohol Use No     Social History     Substance and Sexual Activity   Drug Use No     Social History     Tobacco Use   Smoking Status Never   Smokeless Tobacco Never     Family History:   Family History   Problem Relation Age of Onset    Skin cancer Father        Meds/Allergies   (Not in a hospital admission)    Allergies   Allergen Reactions    Other Allergic Rhinitis     Environmental       Objective     Current Vitals:   Blood Pressure: 151/84 (03/04/24 1022)  Pulse: 89 (03/04/24 1022)  Height: 5' 6\" (167.6 cm) (03/04/24 1022)  Weight - Scale: 85.5 kg (188 lb 9.6 oz) (w/clothes and shoes) (03/04/24 1022)        /84   Pulse 89   Ht 5' 6\" (1.676 m)   Wt 85.5 kg (188 lb 9.6 oz) Comment: w/clothes and shoes  BMI 30.44 kg/m²       Lower Extremity Exam:    Foot Exam    Musculoskeletal:  MMT is 5/5 to all compartments of the LE B/L, +1/4 edema LLE, Mild Pain on " palpation of posterior tibial tendon insertion and proximally along tendon course, Equinus is present. No pain with passive ROM of pedal joints.    Biomechanical Exam of LE:  Hip ROM WNL Bilateral, external and internal rotation about equal at 45° b/l  Knee ROM WNL Bilateral, no hyperextension noted b/l, no genu varum/valgum noted b/l  Ankle dorsiflexion with knee extended is limited and unable to get pass vertical on right and limited and unable to get pass vertical on left  Ankle dorsiflexion with knee flexed is limited and unable to get pass vertical on right and limited and unable to get pass vertical on left  Malleolar positioning is WNL b/l  STJ ROM WNL b/l, heel inversion is approximately 20° on right and 20° on left; heel eversion is approximately 10° on right and 10° on left; neutral calcaneal stance position is 0°   1st ray ROM WNL b/l, able to dorsiflex/plantarflex b/l  Tibial varum is 0° b/l, Resting calcaneal stance position is valgus and approximately 5° on right and valgus and approximately 5° on left  Gait analysis: pronated  Gross deformity noted: bilateral pes planus      Vascular:   DP pulses are present bilaterally and PT pulses are present bilaterally., CFT< 3sec to all digits. Pedal hair is Present. Skin temperature warm to warm B/L.     Dermatological:  No open Lesions. Skin of the LE is normal texture, temperature, turgor B/L. Interdigital maceration is not present.        Neurologic:  Gross sensation is intact. Monofilament sensation is Intact. Sharp sensation is present. Negative Tinel's sign.

## 2024-03-05 ENCOUNTER — OFFICE VISIT (OUTPATIENT)
Dept: FAMILY MEDICINE CLINIC | Facility: CLINIC | Age: 74
End: 2024-03-05
Payer: MEDICARE

## 2024-03-05 VITALS
SYSTOLIC BLOOD PRESSURE: 134 MMHG | DIASTOLIC BLOOD PRESSURE: 80 MMHG | HEART RATE: 83 BPM | WEIGHT: 187.6 LBS | RESPIRATION RATE: 20 BRPM | HEIGHT: 66 IN | BODY MASS INDEX: 30.15 KG/M2 | TEMPERATURE: 98 F | OXYGEN SATURATION: 96 %

## 2024-03-05 DIAGNOSIS — R97.20 ELEVATED PSA: ICD-10-CM

## 2024-03-05 DIAGNOSIS — R41.3 MEMORY DIFFICULTY: ICD-10-CM

## 2024-03-05 DIAGNOSIS — M25.562 CHRONIC PAIN OF LEFT KNEE: ICD-10-CM

## 2024-03-05 DIAGNOSIS — G89.29 CHRONIC PAIN OF LEFT KNEE: ICD-10-CM

## 2024-03-05 DIAGNOSIS — N20.0 NEPHROLITHIASIS: ICD-10-CM

## 2024-03-05 DIAGNOSIS — R73.03 PREDIABETES: ICD-10-CM

## 2024-03-05 DIAGNOSIS — N18.31 STAGE 3A CHRONIC KIDNEY DISEASE (HCC): ICD-10-CM

## 2024-03-05 DIAGNOSIS — E78.2 MIXED HYPERLIPIDEMIA: Primary | ICD-10-CM

## 2024-03-05 DIAGNOSIS — Z00.00 MEDICARE ANNUAL WELLNESS VISIT, SUBSEQUENT: ICD-10-CM

## 2024-03-05 DIAGNOSIS — J31.0 CHRONIC RHINITIS: ICD-10-CM

## 2024-03-05 DIAGNOSIS — K21.9 GASTROESOPHAGEAL REFLUX DISEASE, UNSPECIFIED WHETHER ESOPHAGITIS PRESENT: ICD-10-CM

## 2024-03-05 PROBLEM — H25.9 AGE-RELATED CATARACT OF RIGHT EYE: Status: RESOLVED | Noted: 2022-06-28 | Resolved: 2024-03-05

## 2024-03-05 PROBLEM — H25.9 AGE-RELATED CATARACT OF LEFT EYE: Status: RESOLVED | Noted: 2022-06-28 | Resolved: 2024-03-05

## 2024-03-05 PROCEDURE — G0439 PPPS, SUBSEQ VISIT: HCPCS | Performed by: FAMILY MEDICINE

## 2024-03-05 PROCEDURE — 99214 OFFICE O/P EST MOD 30 MIN: CPT | Performed by: FAMILY MEDICINE

## 2024-03-05 RX ORDER — IPRATROPIUM BROMIDE 42 UG/1
SPRAY, METERED NASAL
Qty: 75 ML | Refills: 3 | Status: SHIPPED | OUTPATIENT
Start: 2024-03-05

## 2024-03-05 RX ORDER — EZETIMIBE 10 MG/1
10 TABLET ORAL DAILY
Qty: 90 TABLET | Refills: 3 | Status: SHIPPED | OUTPATIENT
Start: 2024-03-05

## 2024-03-05 RX ORDER — FAMOTIDINE 20 MG/1
TABLET, FILM COATED ORAL
Qty: 180 TABLET | Refills: 1 | Status: SHIPPED | OUTPATIENT
Start: 2024-03-05

## 2024-03-05 RX ORDER — FENOFIBRATE 160 MG/1
160 TABLET ORAL DAILY
Qty: 90 TABLET | Refills: 3 | Status: SHIPPED | OUTPATIENT
Start: 2024-03-05

## 2024-03-05 NOTE — ASSESSMENT & PLAN NOTE
Lab Results   Component Value Date    EGFR 57 03/22/2023    EGFR 55 03/06/2023    EGFR 62 10/04/2021    CREATININE 1.25 03/22/2023    CREATININE 1.28 03/06/2023    CREATININE 1.18 10/04/2021     Recommended to avoid NSAID's, nephrotoxins.    Stay well-hydrated.  Check creatinine, BUN.

## 2024-03-05 NOTE — ASSESSMENT & PLAN NOTE
Patient was seen by neurology CRNP in March 2023.    Reports no significant changes in memory since last year.    Recommended to engage in brain stimulating activities, stay physically and socially active.

## 2024-03-05 NOTE — PATIENT INSTRUCTIONS
Medicare Preventive Visit Patient Instructions  Thank you for completing your Welcome to Medicare Visit or Medicare Annual Wellness Visit today. Your next wellness visit will be due in one year (3/6/2025).  The screening/preventive services that you may require over the next 5-10 years are detailed below. Some tests may not apply to you based off risk factors and/or age. Screening tests ordered at today's visit but not completed yet may show as past due. Also, please note that scanned in results may not display below.  Preventive Screenings:  Service Recommendations Previous Testing/Comments   Colorectal Cancer Screening  Colonoscopy    Fecal Occult Blood Test (FOBT)/Fecal Immunochemical Test (FIT)  Fecal DNA/Cologuard Test  Flexible Sigmoidoscopy Age: 45-75 years old   Colonoscopy: every 10 years (May be performed more frequently if at higher risk)  OR  FOBT/FIT: every 1 year  OR  Cologuard: every 3 years  OR  Sigmoidoscopy: every 5 years  Screening may be recommended earlier than age 45 if at higher risk for colorectal cancer. Also, an individualized decision between you and your healthcare provider will decide whether screening between the ages of 76-85 would be appropriate. Colonoscopy: 05/22/2020  FOBT/FIT: Not on file  Cologuard: Not on file  Sigmoidoscopy: Not on file    Screening Current     Prostate Cancer Screening Individualized decision between patient and health care provider in men between ages of 55-69   Medicare will cover every 12 months beginning on the day after your 50th birthday PSA: 7.6 ng/mL     Screening Current     Hepatitis C Screening Once for adults born between 1945 and 1965  More frequently in patients at high risk for Hepatitis C Hep C Antibody: 01/09/2020    Screening Current   Diabetes Screening 1-2 times per year if you're at risk for diabetes or have pre-diabetes Fasting glucose: 90 mg/dL (3/6/2023)  A1C: 5.9 % (3/6/2023)  Screening Current   Cholesterol Screening Once every 5  years if you don't have a lipid disorder. May order more often based on risk factors. Lipid panel: 03/06/2023  Screening Not Indicated  History Lipid Disorder      Other Preventive Screenings Covered by Medicare:  Abdominal Aortic Aneurysm (AAA) Screening: covered once if your at risk. You're considered to be at risk if you have a family history of AAA or a male between the age of 65-75 who smoking at least 100 cigarettes in your lifetime.  Lung Cancer Screening: covers low dose CT scan once per year if you meet all of the following conditions: (1) Age 55-77; (2) No signs or symptoms of lung cancer; (3) Current smoker or have quit smoking within the last 15 years; (4) You have a tobacco smoking history of at least 20 pack years (packs per day x number of years you smoked); (5) You get a written order from a healthcare provider.  Glaucoma Screening: covered annually if you're considered high risk: (1) You have diabetes OR (2) Family history of glaucoma OR (3)  aged 50 and older OR (4)  American aged 65 and older  Osteoporosis Screening: covered every 2 years if you meet one of the following conditions: (1) Have a vertebral abnormality; (2) On glucocorticoid therapy for more than 3 months; (3) Have primary hyperparathyroidism; (4) On osteoporosis medications and need to assess response to drug therapy.  HIV Screening: covered annually if you're between the age of 15-65. Also covered annually if you are younger than 15 and older than 65 with risk factors for HIV infection. For pregnant patients, it is covered up to 3 times per pregnancy.    Immunizations:  Immunization Recommendations   Influenza Vaccine Annual influenza vaccination during flu season is recommended for all persons aged >= 6 months who do not have contraindications   Pneumococcal Vaccine   * Pneumococcal conjugate vaccine = PCV13 (Prevnar 13), PCV15 (Vaxneuvance), PCV20 (Prevnar 20)  * Pneumococcal polysaccharide vaccine = PPSV23  (Pneumovax) Adults 19-63 yo with certain risk factors or if 65+ yo  If never received any pneumonia vaccine: recommend Prevnar 20 (PCV20)  Give PCV20 if previously received 1 dose of PCV13 or PPSV23   Hepatitis B Vaccine 3 dose series if at intermediate or high risk (ex: diabetes, end stage renal disease, liver disease)   Respiratory syncytial virus (RSV) Vaccine - COVERED BY MEDICARE PART D  * RSVPreF3 (Arexvy) CDC recommends that adults 60 years of age and older may receive a single dose of RSV vaccine using shared clinical decision-making (SCDM)   Tetanus (Td) Vaccine - COST NOT COVERED BY MEDICARE PART B Following completion of primary series, a booster dose should be given every 10 years to maintain immunity against tetanus. Td may also be given as tetanus wound prophylaxis.   Tdap Vaccine - COST NOT COVERED BY MEDICARE PART B Recommended at least once for all adults. For pregnant patients, recommended with each pregnancy.   Shingles Vaccine (Shingrix) - COST NOT COVERED BY MEDICARE PART B  2 shot series recommended in those 19 years and older who have or will have weakened immune systems or those 50 years and older     Health Maintenance Due:      Topic Date Due   • Colorectal Cancer Screening  05/22/2023   • Hepatitis C Screening  Completed     Immunizations Due:      Topic Date Due   • Pneumococcal Vaccine: 65+ Years (1 of 1 - PCV) Never done   • Influenza Vaccine (1) Never done   • COVID-19 Vaccine (1 - 2023-24 season) Never done     Advance Directives   What are advance directives?  Advance directives are legal documents that state your wishes and plans for medical care. These plans are made ahead of time in case you lose your ability to make decisions for yourself. Advance directives can apply to any medical decision, such as the treatments you want, and if you want to donate organs.   What are the types of advance directives?  There are many types of advance directives, and each state has rules about how  to use them. You may choose a combination of any of the following:  Living will:  This is a written record of the treatment you want. You can also choose which treatments you do not want, which to limit, and which to stop at a certain time. This includes surgery, medicine, IV fluid, and tube feedings.   Durable power of  for healthcare (DPAHC):  This is a written record that states who you want to make healthcare choices for you when you are unable to make them for yourself. This person, called a proxy, is usually a family member or a friend. You may choose more than 1 proxy.  Do not resuscitate (DNR) order:  A DNR order is used in case your heart stops beating or you stop breathing. It is a request not to have certain forms of treatment, such as CPR. A DNR order may be included in other types of advance directives.  Medical directive:  This covers the care that you want if you are in a coma, near death, or unable to make decisions for yourself. You can list the treatments you want for each condition. Treatment may include pain medicine, surgery, blood transfusions, dialysis, IV or tube feedings, and a ventilator (breathing machine).  Values history:  This document has questions about your views, beliefs, and how you feel and think about life. This information can help others choose the care that you would choose.  Why are advance directives important?  An advance directive helps you control your care. Although spoken wishes may be used, it is better to have your wishes written down. Spoken wishes can be misunderstood, or not followed. Treatments may be given even if you do not want them. An advance directive may make it easier for your family to make difficult choices about your care.   Weight Management   Why it is important to manage your weight:  Being overweight increases your risk of health conditions such as heart disease, high blood pressure, type 2 diabetes, and certain types of cancer. It can also  increase your risk for osteoarthritis, sleep apnea, and other respiratory problems. Aim for a slow, steady weight loss. Even a small amount of weight loss can lower your risk of health problems.  How to lose weight safely:  A safe and healthy way to lose weight is to eat fewer calories and get regular exercise. You can lose up about 1 pound a week by decreasing the number of calories you eat by 500 calories each day.   Healthy meal plan for weight management:  A healthy meal plan includes a variety of foods, contains fewer calories, and helps you stay healthy. A healthy meal plan includes the following:  Eat whole-grain foods more often.  A healthy meal plan should contain fiber. Fiber is the part of grains, fruits, and vegetables that is not broken down by your body. Whole-grain foods are healthy and provide extra fiber in your diet. Some examples of whole-grain foods are whole-wheat breads and pastas, oatmeal, brown rice, and bulgur.  Eat a variety of vegetables every day.  Include dark, leafy greens such as spinach, kale, heidi greens, and mustard greens. Eat yellow and orange vegetables such as carrots, sweet potatoes, and winter squash.   Eat a variety of fruits every day.  Choose fresh or canned fruit (canned in its own juice or light syrup) instead of juice. Fruit juice has very little or no fiber.  Eat low-fat dairy foods.  Drink fat-free (skim) milk or 1% milk. Eat fat-free yogurt and low-fat cottage cheese. Try low-fat cheeses such as mozzarella and other reduced-fat cheeses.  Choose meat and other protein foods that are low in fat.  Choose beans or other legumes such as split peas or lentils. Choose fish, skinless poultry (chicken or turkey), or lean cuts of red meat (beef or pork). Before you cook meat or poultry, cut off any visible fat.   Use less fat and oil.  Try baking foods instead of frying them. Add less fat, such as margarine, sour cream, regular salad dressing and mayonnaise to foods. Eat  fewer high-fat foods. Some examples of high-fat foods include french fries, doughnuts, ice cream, and cakes.  Eat fewer sweets.  Limit foods and drinks that are high in sugar. This includes candy, cookies, regular soda, and sweetened drinks.  Exercise:  Exercise at least 30 minutes per day on most days of the week. Some examples of exercise include walking, biking, dancing, and swimming. You can also fit in more physical activity by taking the stairs instead of the elevator or parking farther away from stores. Ask your healthcare provider about the best exercise plan for you.      © Copyright [employer] 2018 Information is for End User's use only and may not be sold, redistributed or otherwise used for commercial purposes. All illustrations and images included in CareNotes® are the copyrighted property of [employer] Inc[employer] or [employer]

## 2024-03-05 NOTE — ASSESSMENT & PLAN NOTE
Recommended to avoid starchy foods, refined sugars.    Encouraged regular exercise, lose weight.    Check Hb A1C.

## 2024-03-05 NOTE — PROGRESS NOTES
Assessment and Plan:     Problem List Items Addressed This Visit          Digestive    GERD (gastroesophageal reflux disease)     Symptoms improved.  Continue Famotidine 20 mg 1 tablet twice daily before meals.         Relevant Medications    famotidine (PEPCID) 20 mg tablet       Genitourinary    Nephrolithiasis     Patient reports no back pain, blood in urine.  Recommended to stay well-hydrated.         Stage 3a chronic kidney disease (HCC)     Lab Results   Component Value Date    EGFR 57 03/22/2023    EGFR 55 03/06/2023    EGFR 62 10/04/2021    CREATININE 1.25 03/22/2023    CREATININE 1.28 03/06/2023    CREATININE 1.18 10/04/2021     Recommended to avoid NSAID's, nephrotoxins.    Stay well-hydrated.  Check creatinine, BUN.            Other    Mixed hyperlipidemia - Primary     Continue Zetia 10 mg daily, Fenofibrate 160 mg daily.  Check CMP, lipid panel.           Relevant Medications    ezetimibe (ZETIA) 10 mg tablet    fenofibrate 160 MG tablet    Other Relevant Orders    Comprehensive metabolic panel    Lipid panel    Prediabetes     Recommended to avoid starchy foods, refined sugars.    Encouraged regular exercise, lose weight.    Check Hb A1C.         Relevant Orders    Comprehensive metabolic panel    Hemoglobin A1C    Medicare annual wellness visit, subsequent    Elevated PSA     Check PSA level.    Recommended to schedule follow-up visit  with urology for prostate exam.         Relevant Orders    PSA Total, Diagnostic    Chronic pain of left knee     Take Tylenol as needed for pain.    Follow-up with orthopedic surgeon Dr. Barbour.           Memory difficulty     Patient was seen by neurology CRNP in March 2023.    Reports no significant changes in memory since last year.    Recommended to engage in brain stimulating activities, stay physically and socially active.          Other Visit Diagnoses       Chronic rhinitis        Relevant Medications    ipratropium (ATROVENT) 0.06 % nasal spray             Depression Screening and Follow-up Plan: Patient was screened for depression during today's encounter. They screened negative with a PHQ-2 score of 0.      Preventive health issues were discussed with patient, and age appropriate screening tests were ordered as noted in patient's After Visit Summary.  Personalized health advice and appropriate referrals for health education or preventive services given if needed, as noted in patient's After Visit Summary.    Schedule follow-up visit, Medicare AWV in 1 year.  Call office with any acute medical problems.     History of Present Illness:     Patient presents for a Medicare Wellness Visit    HPI     Patient presents for annual follow-up visit, Medicare AWV.    PMHx: Hyperlipidemia, Prediabetes, GERD, LPR, chronic rhinitis, kidney stones, CKD stage 3, BPH.     Current medications, last blood test results from March 2023.    Hyperlipidemia - currently taking Fenofibrate 160 mg daily, Zetia 10 mg daily.    Patient does not exercise on a regular basis.    GERD - symptoms controlled on Famotidine 20 mg twice daily before meals.    Patient has been followed by orthopedic surgeon Dr. Barbour for left knee OA, had Visco injections last year which did not provide any significant relief in pain.    Started taking collagen.    Colonoscopy performed in May 2020 by colorectal surgeon Dr. Jamil who recommended to repeat colonoscopy in 3 years due to history of colon polyps.    Patient refusing immunizations.      Denies family history of colon cancer, prostate cancer.    PSA level was elevated last year at 7.6.    Patient did not schedule visit with urology as discussed.    Cognitive status is stable.  Denies falls.     Patient Care Team:  Lanette Mendoza MD as PCP - General  MD Mu Callahan MD (Colon and Rectal Surgery)     Review of Systems:     Review of Systems   Constitutional:  Positive for fatigue. Negative for activity change,  appetite change, chills and fever.   HENT:  Negative for congestion, ear pain, hearing loss, sore throat and trouble swallowing.    Eyes:  Negative for pain, discharge, redness, itching and visual disturbance.        Wears glasses   Respiratory:  Negative for cough, chest tightness, shortness of breath and wheezing.    Cardiovascular:  Negative for chest pain, palpitations and leg swelling.   Gastrointestinal:  Negative for abdominal pain, blood in stool, constipation, diarrhea, nausea and vomiting.   Genitourinary:  Negative for difficulty urinating, dysuria and hematuria.        Nocturia x 2   Musculoskeletal:  Positive for arthralgias (left knee). Negative for back pain, gait problem and joint swelling.   Skin:  Negative for rash.   Neurological:  Negative for dizziness, syncope and headaches.   Hematological: Negative.    Psychiatric/Behavioral:  Negative for dysphoric mood and sleep disturbance. The patient is not nervous/anxious.         Problem List:     Patient Active Problem List   Diagnosis    Mixed hyperlipidemia    Prediabetes    Nephrolithiasis    BPH associated with nocturia    Medicare annual wellness visit, subsequent    Elevated PSA    GERD (gastroesophageal reflux disease)    Groin pain, left    Non-healing skin lesion of nose    Status post left inguinal hernia repair    Chronic pain of left knee    Memory difficulty    Acute left ankle pain    Posterior tibial tendon dysfunction (PTTD) of left lower extremity    Stage 3a chronic kidney disease (HCC)      Past Medical and Surgical History:     Past Medical History:   Diagnosis Date    Allergic rhinitis     BPH (benign prostatic hyperplasia)     GERD (gastroesophageal reflux disease)     HL (hearing loss)     Wears hearing aids    Hyperlipidemia     Inguinal hernia of left side with obstruction     Nephrolithiasis     Tinnitus      Past Surgical History:   Procedure Laterality Date    ADENOIDECTOMY      INGUINAL HERNIA REPAIR      [place] RPR 1ST INGUN  HRNA AGE 5 YRS/> REDUCIBLE Left 10/14/2021    Procedure: REPAIR HERNIA INGUINAL;  Surgeon: Juan Pereira MD;  Location: [facility];  Service: General    TONSILLECTOMY      WISDOM TOOTH EXTRACTION        Family History:     Family History   Problem Relation Age of Onset    Skin cancer Father       Social History:     Social History     Socioeconomic History    Marital status: /Civil Union     Spouse name: None    Number of children: None    Years of education: None    Highest education level: None   Occupational History    None   Tobacco Use    Smoking status: Never     Passive exposure: Never    Smokeless tobacco: Never   Vaping Use    Vaping status: Never Used   Substance and Sexual Activity    Alcohol use: No    Drug use: No    Sexual activity: None   Other Topics Concern    None   Social History Narrative    None     Social Determinants of Health     Financial Resource Strain: Patient Declined (3/5/2024)    Overall Financial Resource Strain (CARDIA)     Difficulty of Paying Living Expenses: Patient declined   Food Insecurity: Not on file   Transportation Needs: No Transportation Needs (3/5/2024)    PRAPARE - Transportation     Lack of Transportation (Medical): No     Lack of Transportation (Non-Medical): No   Physical Activity: Not on file   Stress: Not on file   Social Connections: Not on file   Intimate Partner Violence: Not on file   Housing Stability: Not on file      Medications and Allergies:     Current Outpatient Medications   Medication Sig Dispense Refill    Calcium Carb-Cholecalciferol 600-800 MG-UNIT TABS Take by mouth daily       ezetimibe (ZETIA) 10 mg tablet Take 1 tablet (10 mg total) by mouth daily 90 tablet 3    famotidine (PEPCID) 20 mg tablet TAKE 1 TABLET TWICE DAILY BEFORE MEALS 180 tablet 1    fenofibrate 160 MG tablet Take 1 tablet (160 mg total) by mouth daily 90 tablet 3    Glucosamine-Chondroitin--400-375 MG TABS Take by mouth daily       ipratropium (ATROVENT) 0.06 %  nasal spray USE 2 SPRAYS IN EACH NOSTRIL TWICE DAILY 75 mL 3    multivitamin (THERAGRAN) TABS Take by mouth       No current facility-administered medications for this visit.     Allergies   Allergen Reactions    Other Allergic Rhinitis     Environmental      Immunizations:     There is no immunization history for the selected administration types on file for this patient.   Health Maintenance:         Topic Date Due    Colorectal Cancer Screening  05/22/2023    Hepatitis C Screening  Completed         Topic Date Due    Pneumococcal Vaccine: 65+ Years (1 of 1 - PCV) Never done    Influenza Vaccine (1) Never done    COVID-19 Vaccine (1 - 2023-24 season) Never done      Medicare Screening Tests and Risk Assessments:     Eddie is here for his Subsequent Wellness visit.     Health Risk Assessment:   Patient rates overall health as good. Patient feels that their physical health rating is same. Patient is satisfied with their life. Eyesight was rated as same. Hearing was rated as same. Patient feels that their emotional and mental health rating is same. Patients states they are never, rarely angry. Patient states they are never, rarely unusually tired/fatigued. Pain experienced in the last 7 days has been some. Patient's pain rating has been 3/10. Patient states that he has experienced no weight loss or gain in last 6 months.     Depression Screening:   PHQ-2 Score: 0      Fall Risk Screening:   In the past year, patient has experienced: no history of falling in past year      Home Safety:  Patient does not have trouble with stairs inside or outside of their home. Patient has working smoke alarms and has working carbon monoxide detector. Home safety hazards include: none.     Nutrition:   Current diet is Regular and Low Carb.     Medications:   Patient is not currently taking any over-the-counter supplements. Patient is able to manage medications.     Activities of Daily Living (ADLs)/Instrumental Activities of Daily  Living (IADLs):   Walk and transfer into and out of bed and chair?: Yes  Dress and groom yourself?: Yes    Bathe or shower yourself?: Yes    Feed yourself? Yes  Do your laundry/housekeeping?: Yes  Manage your money, pay your bills and track your expenses?: Yes  Make your own meals?: Yes    Do your own shopping?: Yes    Previous Hospitalizations:   Any hospitalizations or ED visits within the last 12 months?: No      Advance Care Planning:   Living will: No    Durable POA for healthcare: No    Advanced directive: No    Advanced directive counseling given: Yes    ACP document given: Yes      Cognitive Screening:   Provider or family/friend/caregiver concerned regarding cognition?: No    PREVENTIVE SCREENINGS      Cardiovascular Screening:    General: Screening Not Indicated and History Lipid Disorder      Diabetes Screening:     General: Screening Current      Colorectal Cancer Screening:     General: Screening Current      Prostate Cancer Screening:    General: Screening Current    Due for: PSA      Osteoporosis Screening:    General: Screening Not Indicated and History Osteoporosis      Abdominal Aortic Aneurysm (AAA) Screening:    Risk factors include: age between 65-76 yo        General: Screening Not Indicated      Lung Cancer Screening:     General: Screening Not Indicated      Hepatitis C Screening:    General: Screening Current    Screening, Brief Intervention, and Referral to Treatment (SBIRT)    Screening  Typical number of drinks in a day: 0  Typical number of drinks in a week: 2  Interpretation: Low risk drinking behavior.    Single Item Drug Screening:  How often have you used an illegal drug (including marijuana) or a prescription medication for non-medical reasons in the past year? never    Single Item Drug Screen Score: 0  Interpretation: Negative screen for possible drug use disorder    Other Counseling Topics:   Car/seat belt/driving safety, skin self-exam and calcium and vitamin D intake and regular  "weightbearing exercise.     No results found.     Physical Exam:     /80 (BP Location: Left arm, Patient Position: Sitting, Cuff Size: Standard)   Pulse 83   Temp 98 °F (36.7 °C) (Tympanic)   Resp 20   Ht 5' 6\" (1.676 m)   Wt 85.1 kg (187 lb 9.6 oz)   SpO2 96%   BMI 30.28 kg/m²     Physical Exam  Vitals and nursing note reviewed.   Constitutional:       Appearance: Normal appearance. He is obese.   HENT:      Head: Normocephalic and atraumatic.   Eyes:      Conjunctiva/sclera: Conjunctivae normal.      Pupils: Pupils are equal, round, and reactive to light.   Neck:      Vascular: No carotid bruit.   Cardiovascular:      Rate and Rhythm: Normal rate and regular rhythm.      Heart sounds: No murmur heard.  Pulmonary:      Effort: Pulmonary effort is normal.      Breath sounds: Normal breath sounds.   Abdominal:      General: Bowel sounds are normal. There is no distension.      Palpations: Abdomen is soft.      Tenderness: There is no abdominal tenderness.   Musculoskeletal:         General: No swelling.      Cervical back: Normal range of motion and neck supple.      Right lower leg: No edema.      Left lower leg: No edema.   Skin:     General: Skin is warm and dry.      Findings: No rash.   Neurological:      General: No focal deficit present.      Mental Status: He is alert and oriented to person, place, and time.      Motor: No weakness.      Gait: Gait normal.   Psychiatric:         Mood and Affect: Mood normal.          Lanette Mendoza MD  "

## 2024-03-07 ENCOUNTER — APPOINTMENT (OUTPATIENT)
Dept: LAB | Facility: CLINIC | Age: 74
End: 2024-03-07
Payer: MEDICARE

## 2024-03-07 DIAGNOSIS — R73.03 PREDIABETES: ICD-10-CM

## 2024-03-07 DIAGNOSIS — R97.20 ELEVATED PSA: Primary | ICD-10-CM

## 2024-03-07 DIAGNOSIS — R97.20 ELEVATED PSA: ICD-10-CM

## 2024-03-07 DIAGNOSIS — E78.2 MIXED HYPERLIPIDEMIA: ICD-10-CM

## 2024-03-07 LAB
ALBUMIN SERPL BCP-MCNC: 4.4 G/DL (ref 3.5–5)
ALP SERPL-CCNC: 32 U/L (ref 34–104)
ALT SERPL W P-5'-P-CCNC: 28 U/L (ref 7–52)
ANION GAP SERPL CALCULATED.3IONS-SCNC: 6 MMOL/L
AST SERPL W P-5'-P-CCNC: 24 U/L (ref 13–39)
BILIRUB SERPL-MCNC: 0.46 MG/DL (ref 0.2–1)
BUN SERPL-MCNC: 21 MG/DL (ref 5–25)
CALCIUM SERPL-MCNC: 9.2 MG/DL (ref 8.4–10.2)
CHLORIDE SERPL-SCNC: 105 MMOL/L (ref 96–108)
CHOLEST SERPL-MCNC: 167 MG/DL
CO2 SERPL-SCNC: 31 MMOL/L (ref 21–32)
CREAT SERPL-MCNC: 1.16 MG/DL (ref 0.6–1.3)
EST. AVERAGE GLUCOSE BLD GHB EST-MCNC: 137 MG/DL
GFR SERPL CREATININE-BSD FRML MDRD: 62 ML/MIN/1.73SQ M
GLUCOSE P FAST SERPL-MCNC: 93 MG/DL (ref 65–99)
HBA1C MFR BLD: 6.4 %
HDLC SERPL-MCNC: 40 MG/DL
LDLC SERPL CALC-MCNC: 94 MG/DL (ref 0–100)
NONHDLC SERPL-MCNC: 127 MG/DL
POTASSIUM SERPL-SCNC: 3.8 MMOL/L (ref 3.5–5.3)
PROT SERPL-MCNC: 7.4 G/DL (ref 6.4–8.4)
PSA SERPL-MCNC: 6.57 NG/ML (ref 0–4)
SODIUM SERPL-SCNC: 142 MMOL/L (ref 135–147)
TRIGL SERPL-MCNC: 167 MG/DL

## 2024-03-07 PROCEDURE — 84153 ASSAY OF PSA TOTAL: CPT

## 2024-03-07 PROCEDURE — 80053 COMPREHEN METABOLIC PANEL: CPT

## 2024-03-07 PROCEDURE — 36415 COLL VENOUS BLD VENIPUNCTURE: CPT

## 2024-03-07 PROCEDURE — 83036 HEMOGLOBIN GLYCOSYLATED A1C: CPT

## 2024-03-07 PROCEDURE — 80061 LIPID PANEL: CPT

## 2024-05-01 PROBLEM — Z00.00 MEDICARE ANNUAL WELLNESS VISIT, SUBSEQUENT: Status: RESOLVED | Noted: 2019-12-22 | Resolved: 2024-05-01

## 2024-05-10 ENCOUNTER — CONSULT (OUTPATIENT)
Dept: UROLOGY | Facility: AMBULATORY SURGERY CENTER | Age: 74
End: 2024-05-10
Payer: MEDICARE

## 2024-05-10 VITALS
OXYGEN SATURATION: 98 % | SYSTOLIC BLOOD PRESSURE: 132 MMHG | HEIGHT: 66 IN | WEIGHT: 182 LBS | HEART RATE: 72 BPM | BODY MASS INDEX: 29.25 KG/M2 | DIASTOLIC BLOOD PRESSURE: 80 MMHG

## 2024-05-10 DIAGNOSIS — R97.20 ELEVATED PSA: ICD-10-CM

## 2024-05-10 PROCEDURE — 99204 OFFICE O/P NEW MOD 45 MIN: CPT | Performed by: UROLOGY

## 2024-05-10 NOTE — ASSESSMENT & PLAN NOTE
Elevated PSA 4.2 (1/9/20) to 7.6  (3/6/23) to 6.57 (3/7/24)  No contraindications for an MRI  - 3/7/24 kidney function stable: GFR 62, Cr 1.16    Plan  - given elevated PSA with downward trend, will order multiphasic MRI  - f/u with Urology to review MRI

## 2024-05-10 NOTE — PROGRESS NOTES
5/10/2024    Eddie Perez Jr.  1950  [id number]    1. Elevated PSA  Assessment & Plan:  Elevated PSA 4.2 (1/9/20) to 7.6  (3/6/23) to 6.57 (3/7/24)  No contraindications for an MRI  - 3/7/24 kidney function stable: GFR 62, Cr 1.16    Plan  - given elevated PSA with downward trend, will order multiphasic MRI  - f/u with Urology to review MRI      Orders:  -     Ambulatory Referral to Urology  -     MRI prostate multiparametric wo w contrast; Future; Expected date: 05/24/2024       Based on his overall trend in PSA the patient is agreeable with multiparametric MRI of the prostate.  He will return when this has been completed to see if an MRI fusion biopsy is indicated.  The patient and his wife are amenable with this plan.    History of Present Illness  73 y.o. male with a history of CKD stage 3, nephrolithiasis, prediabetes, and BPH with nocturia. Pt presents with his wife for elevated PSA. Pt reports frequent urination in the morning and nocturia about 1-2 times per night. He notes a weak stream with post-micturition dribble. These symptoms are tolerable and do not bother the patient. He has never taken a medication for BPH and is not interested in one at this time.     Patient denies hematuria, dysuria, pressure sensation, weight loss, spiny bone pain.   - No family history or prostate cancer  - never smoker    AUA Symptom Score      Past Medical History  Past Medical History:   Diagnosis Date   • Allergic rhinitis    • BPH (benign prostatic hyperplasia)    • GERD (gastroesophageal reflux disease)    • HL (hearing loss)     Wears hearing aids   • Hyperlipidemia    • Inguinal hernia of left side with obstruction    • Nephrolithiasis    • Tinnitus        Past Social History  Past Surgical History:   Procedure Laterality Date   • ADENOIDECTOMY     • INGUINAL HERNIA REPAIR     • [place] RPR 1ST INGUN HRNA AGE 5 YRS/> REDUCIBLE Left 10/14/2021    Procedure: REPAIR HERNIA INGUINAL;  Surgeon: Juan Pereira MD;   Location: [facility];  Service: General   • TONSILLECTOMY     • WISDOM TOOTH EXTRACTION         Past Family History  Family History   Problem Relation Age of Onset   • Skin cancer Father        Past Social history  Social History     Socioeconomic History   • Marital status: /Civil Union     Spouse name: Not on file   • Number of children: Not on file   • Years of education: Not on file   • Highest education level: Not on file   Occupational History   • Not on file   Tobacco Use   • Smoking status: Never     Passive exposure: Never   • Smokeless tobacco: Never   Vaping Use   • Vaping status: Never Used   Substance and Sexual Activity   • Alcohol use: No   • Drug use: No   • Sexual activity: Not on file   Other Topics Concern   • Not on file   Social History Narrative   • Not on file     Social Determinants of Health     Financial Resource Strain: Patient Declined (3/5/2024)    Overall Financial Resource Strain (CARDIA)    • Difficulty of Paying Living Expenses: Patient declined   Food Insecurity: Not on file   Transportation Needs: No Transportation Needs (3/5/2024)    PRAPARE - Transportation    • Lack of Transportation (Medical): No    • Lack of Transportation (Non-Medical): No   Physical Activity: Not on file   Stress: Not on file   Social Connections: Not on file   Intimate Partner Violence: Not on file   Housing Stability: Not on file       Current Medications  Current Outpatient Medications   Medication Sig Dispense Refill   • Calcium Carb-Cholecalciferol 600-800 MG-UNIT TABS Take by mouth daily      • ezetimibe (ZETIA) 10 mg tablet Take 1 tablet (10 mg total) by mouth daily 90 tablet 3   • famotidine (PEPCID) 20 mg tablet TAKE 1 TABLET TWICE DAILY BEFORE MEALS 180 tablet 1   • fenofibrate 160 MG tablet Take 1 tablet (160 mg total) by mouth daily 90 tablet 3   • Glucosamine-Chondroitin--400-375 MG TABS Take by mouth daily      • ipratropium (ATROVENT) 0.06 % nasal spray USE 2 SPRAYS IN EACH  "NOSTRIL TWICE DAILY 75 mL 3   • multivitamin (THERAGRAN) TABS Take by mouth       No current facility-administered medications for this visit.       Allergies  Allergies   Allergen Reactions   • Other Allergic Rhinitis     Environmental       Past Medical History, Social History, Family History, medications and allergies were reviewed.    Vitals  Vitals:    05/10/24 1339   BP: 132/80   BP Location: Left arm   Patient Position: Sitting   Cuff Size: Standard   Pulse: 72   SpO2: 98%   Weight: 82.6 kg (182 lb)   Height: 5' 6\" (1.676 m)       Physical Exam  Constitutional:       General: He is not in acute distress.     Appearance: He is not ill-appearing or toxic-appearing.   HENT:      Head: Normocephalic and atraumatic.      Right Ear: External ear normal.      Left Ear: External ear normal.   Eyes:      General: No scleral icterus.        Right eye: No discharge.         Left eye: No discharge.      Extraocular Movements: Extraocular movements intact.      Conjunctiva/sclera: Conjunctivae normal.   Cardiovascular:      Rate and Rhythm: Normal rate and regular rhythm.      Heart sounds: Normal heart sounds.   Pulmonary:      Effort: Pulmonary effort is normal. No respiratory distress.   Musculoskeletal:         General: No tenderness.      Right lower leg: No edema.      Left lower leg: No edema.   Skin:     General: Skin is warm and dry.   Neurological:      General: No focal deficit present.      Mental Status: He is alert.      Motor: No weakness.      Gait: Gait normal.   Psychiatric:         Mood and Affect: Mood normal.         Behavior: Behavior normal.         Thought Content: Thought content normal.         Judgment: Judgment normal.         Results  Lab Results   Component Value Date    PSA 6.57 (H) 03/07/2024    PSA 7.6 (H) 03/06/2023    PSA 4.2 (H) 01/09/2020     Lab Results   Component Value Date    GLUCOSE 127 12/24/2015    CALCIUM 9.2 03/07/2024     12/24/2015    K 3.8 03/07/2024    CO2 31 " 03/07/2024     03/07/2024    BUN 21 03/07/2024    CREATININE 1.16 03/07/2024     Lab Results   Component Value Date    WBC 6.44 03/06/2023    HGB 14.5 03/06/2023    HCT 44.4 03/06/2023    MCV 91 03/06/2023     03/06/2023       Office Urine Dip  No results found for this or any previous visit (from the past 1 hour(s)).]

## 2024-05-24 ENCOUNTER — HOSPITAL ENCOUNTER (OUTPATIENT)
Facility: MEDICAL CENTER | Age: 74
Discharge: HOME | End: 2024-05-24
Payer: MEDICARE

## 2024-05-24 DIAGNOSIS — R97.20 ELEVATED PSA: ICD-10-CM

## 2024-05-24 PROCEDURE — 72197 MRI PELVIS W/O & W/DYE: CPT

## 2024-05-24 PROCEDURE — A9585 GADOBUTROL INJECTION: HCPCS | Performed by: UROLOGY

## 2024-05-24 PROCEDURE — 76377 3D RENDER W/INTRP POSTPROCES: CPT

## 2024-05-24 RX ORDER — GADOBUTROL 604.72 MG/ML
8 INJECTION INTRAVENOUS
Status: COMPLETED | OUTPATIENT
Start: 2024-05-24 | End: 2024-05-24

## 2024-05-24 RX ADMIN — GADOBUTROL 8 ML: 604.72 INJECTION INTRAVENOUS at 11:05

## 2024-06-20 ENCOUNTER — OFFICE VISIT (OUTPATIENT)
Dept: UROLOGY | Facility: AMBULATORY SURGERY CENTER | Age: 74
End: 2024-06-20

## 2024-06-20 VITALS
SYSTOLIC BLOOD PRESSURE: 132 MMHG | DIASTOLIC BLOOD PRESSURE: 90 MMHG | OXYGEN SATURATION: 98 % | HEIGHT: 66 IN | WEIGHT: 187 LBS | HEART RATE: 81 BPM | BODY MASS INDEX: 30.05 KG/M2

## 2024-06-20 DIAGNOSIS — R97.20 ELEVATED PSA: Primary | ICD-10-CM

## 2024-06-20 NOTE — ASSESSMENT & PLAN NOTE
Last seen by Dr. Dean on 5/10/2024 for fluctuating and elevated PSAs  Multiparametric MRI of the prostate on 5/24/2024 demonstrated a PI-RADS 2 with no focal lesions or masses, prostate size calculated to be 72 g    Lab Results   Component Value Date    PSA 6.57 (H) 03/07/2024    PSA 7.6 (H) 03/06/2023    PSA 4.2 (H) 01/09/2020     I had a discussion with the patient regarding his very reassuring multiparametric MRI of the prostate.  We reviewed the PI-RADS scoring system as well as his prostate size.  His prostate size was calculated at 72 g and his most recent PSA was reported at 6.5 which strongly correlates with prostate density.  I discussed with him that there is no need for any further intervention or workup at this time.  I am recommending that he have a repeat PSA in 6 months and follow-up in the office at that time.  If his PSA ever fluctuates above his baseline I would recommend proceeding with a transperineal prostate biopsy.  He is understanding of this.  All questions and concerns were addressed at his follow-up appointment today.

## 2024-06-20 NOTE — PROGRESS NOTES
Assessment and plan:     Elevated PSA  Last seen by Dr. Dean on 5/10/2024 for fluctuating and elevated PSAs  Multiparametric MRI of the prostate on 5/24/2024 demonstrated a PI-RADS 2 with no focal lesions or masses, prostate size calculated to be 72 g    Lab Results   Component Value Date    PSA 6.57 (H) 03/07/2024    PSA 7.6 (H) 03/06/2023    PSA 4.2 (H) 01/09/2020     I had a discussion with the patient regarding his very reassuring multiparametric MRI of the prostate.  We reviewed the PI-RADS scoring system as well as his prostate size.  His prostate size was calculated at 72 g and his most recent PSA was reported at 6.5 which strongly correlates with prostate density.  I discussed with him that there is no need for any further intervention or workup at this time.  I am recommending that he have a repeat PSA in 6 months and follow-up in the office at that time.  If his PSA ever fluctuates above his baseline I would recommend proceeding with a transperineal prostate biopsy.  He is understanding of this.  All questions and concerns were addressed at his follow-up appointment today.    History of Present Illness     Eddie Perez Jr. is a 73 y.o. male who presents today to the office for follow-up and review of his most recent multiparametric MRI of the prostate.  He was last seen by Dr. Dean on 5/10/2024 for an elevated PSA.  Due to his fluctuating and elevated PSAs it was recommended that he obtain a multiparametric MRI of the prostate to assess for any potential underlying prostate cancer.  MRI of the prostate did reveal a PI-RADS 2 with no focal lesions or masses, and nonconcerning for clinically significant prostate cancer.  Prostate size was calculated to be 72 g.     At his last appointment he also had complaints of frequent urination and nocturia about 1-2 times per night.  He does endorse a weakened urinary stream with postvoid dribbling.  The patient stated that the symptoms were tolerable  "and were not overly bothersome.  He stated that he had never taken a medication for BPH and he was not interested in initiating any medical therapy at that time.    Today in the office we discussed his MRI of the prostate at length.  We discussed the PI-RADS scoring system and the results of his MRI.  We spoke about prostate density and how his PSA does not strongly correlate with his prostate size.  He is understanding of this.  Unfortunately, he is very upset today in the office due to not seeing a physician regarding his MRI results.  He wishes to only follow with a physician in the future.  He is here today in the office with his wife.    Laboratory     Lab Results   Component Value Date    BUN 21 03/07/2024    CREATININE 1.16 03/07/2024       No components found for: \"GFR\"    Lab Results   Component Value Date    GLUCOSE 127 12/24/2015    CALCIUM 9.2 03/07/2024     12/24/2015    K 3.8 03/07/2024    CO2 31 03/07/2024     03/07/2024       Lab Results   Component Value Date    WBC 6.44 03/06/2023    HGB 14.5 03/06/2023    HCT 44.4 03/06/2023    MCV 91 03/06/2023     03/06/2023       Lab Results   Component Value Date    PSA 6.57 (H) 03/07/2024    PSA 7.6 (H) 03/06/2023    PSA 4.2 (H) 01/09/2020       No results found for this or any previous visit (from the past 1 hour(s)).    Review of Systems     Review of Systems              Allergies     Allergies   Allergen Reactions    Other Allergic Rhinitis     Environmental       Physical Exam     Physical Exam    Vital Signs     Vitals:    06/20/24 0922   BP: 132/90   BP Location: Left arm   Patient Position: Sitting   Cuff Size: Adult   Pulse: 81   SpO2: 98%   Weight: 84.8 kg (187 lb)   Height: 5' 6\" (1.676 m)       Current Medications       Current Outpatient Medications:     Calcium Carb-Cholecalciferol 600-800 MG-UNIT TABS, Take by mouth daily , Disp: , Rfl:     ezetimibe (ZETIA) 10 mg tablet, Take 1 tablet (10 mg total) by mouth daily, Disp: 90 " tablet, Rfl: 3    famotidine (PEPCID) 20 mg tablet, TAKE 1 TABLET TWICE DAILY BEFORE MEALS, Disp: 180 tablet, Rfl: 1    fenofibrate 160 MG tablet, Take 1 tablet (160 mg total) by mouth daily, Disp: 90 tablet, Rfl: 3    Glucosamine-Chondroitin--400-375 MG TABS, Take by mouth daily , Disp: , Rfl:     ipratropium (ATROVENT) 0.06 % nasal spray, USE 2 SPRAYS IN EACH NOSTRIL TWICE DAILY, Disp: 75 mL, Rfl: 3    multivitamin (THERAGRAN) TABS, Take by mouth, Disp: , Rfl:     Active Problems     Patient Active Problem List   Diagnosis    Mixed hyperlipidemia    Prediabetes    Nephrolithiasis    BPH associated with nocturia    Elevated PSA    GERD (gastroesophageal reflux disease)    Groin pain, left    Non-healing skin lesion of nose    Status post left inguinal hernia repair    Chronic pain of left knee    Memory difficulty    Acute left ankle pain    Posterior tibial tendon dysfunction (PTTD) of left lower extremity    Stage 3a chronic kidney disease (HCC)       Past Medical History     Past Medical History:   Diagnosis Date    Allergic rhinitis     BPH (benign prostatic hyperplasia)     GERD (gastroesophageal reflux disease)     HL (hearing loss)     Wears hearing aids    Hyperlipidemia     Inguinal hernia of left side with obstruction     Nephrolithiasis     Tinnitus        Surgical History     Past Surgical History:   Procedure Laterality Date    ADENOIDECTOMY      INGUINAL HERNIA REPAIR      MN RPR 1ST INGUN HRNA AGE 5 YRS/> REDUCIBLE Left 10/14/2021    Procedure: REPAIR HERNIA INGUINAL;  Surgeon: Juan Pereira MD;  Location: [facility];  Service: General    TONSILLECTOMY      WISDOM TOOTH EXTRACTION         Family History     Family History   Problem Relation Age of Onset    Skin cancer Father        Social History     Social History     Social History     Tobacco Use   Smoking Status Never    Passive exposure: Never   Smokeless Tobacco Never       Past Surgical History:   Procedure Laterality Date     ADENOIDECTOMY      INGUINAL HERNIA REPAIR      CT RPR 1ST INGUN HRNA AGE 5 YRS/> REDUCIBLE Left 10/14/2021    Procedure: REPAIR HERNIA INGUINAL;  Surgeon: Juan Pereira MD;  Location: [facility];  Service: General    TONSILLECTOMY      WISDOM TOOTH EXTRACTION           The following portions of the patient's history were reviewed and updated as appropriate: allergies, current medications, past family history, past medical history, past social history, past surgical history and problem list    Please note :  Voice dictation software has been used to create this document.  There may be inadvertent transcription errors.    ANNITA Pratt

## 2024-07-02 ENCOUNTER — TELEPHONE (OUTPATIENT)
Age: 74
End: 2024-07-02

## 2024-07-02 NOTE — TELEPHONE ENCOUNTER
Anabell from [facility] called to set up a pre-op for patient. He will require EKG and Labs for this appt on 7/31/24.    thanks

## 2024-07-09 NOTE — PROGRESS NOTES
PT Evaluation     Today's date: 2024  Patient name: Eddie Perez Jr.  : 1950  MRN: [id number]  Referring provider: Guillermo Jacobo MD  Dx:   Encounter Diagnosis     ICD-10-CM    1. Primary osteoarthritis of left knee  M17.12 Ambulatory Referral to Physical Therapy      2. Pre-op testing  Z01.818           Start Time: 915  Stop Time: 1008  Total time in clinic (min): 53 minutes    Assessment  Impairments: abnormal coordination, abnormal gait, abnormal muscle firing, abnormal muscle tone, abnormal or restricted ROM, abnormal movement, activity intolerance, impaired balance, impaired physical strength, lacks appropriate home exercise program, pain with function, safety issue, scapular dyskinesis, weight-bearing intolerance, poor posture , poor body mechanics, unable to perform ADL, participation limitations, activity limitations and endurance  Symptom irritability: high    Assessment details: Eddie Perez Jr. is a pleasant 73 y.o. male who presents accompanied with spouse to outpatient physical therapy with a referral for pre-operative evaluation for LEFT TKA that they will be undergoing on 24 performed at [facility] via Guillermo Jacobo MD. Patient primary diagnosis is osteoarthritis of L knee. Patient presents with left knee pain when performing functional activities such as prolonged loading activities, kneeling and stair negotiation. Patient for the most part is very functional, however, notes pain continues to worsen with overuse/loading. Upon further clinical testing, patient demonstrated the following deficits: knee motor dysfunction, lack of TKE, decrease neuromuscular control, weight bearing intolerance, antalgic gait, and regressing in functional transfers (floor to stance, STS). Patient was provided with a Total Joint Pre-Op HEP which will be reviewed in the upcoming post-op session. Educated pt to stop any exercises causing pain increase, pt verbalizes understanding. Pt was educated  on expectations and post-op protocols with demonstrated verbal understanding. Pt would benefit from skilled OP physical therapy to address these, and the below impairments in order to optimize outcomes and promote return to functional baseline.       Patient verbalized understanding of POC.     Please contact me if you have any questions or recommendations. Thank you for the referral and the opportunity to share in [facility] care      Barriers to intervention: medical complexity  Understanding of Dx/Px/POC: good     Prognosis: good    Goals  Short Term Goals:  In 4-6 weeks, the patient will:  1. Pt will report at least a 25% reduction in subjective pain complaints/symptoms to better manage ADLs and household chores.   2. Pt will have improve atleast half a grade per LE MMT  3. Pt independent with initial HEP, rationale, technique and frequency, for ROM and pain control.  4. Pt will have gain 50% improvement in knee AROM  5. Pt will be ambulating with least restrictive AD for household distances        Long Term Goals:  In 12 weeks, the patient will:  1. Pt will have atleast 4/5 graded MMT and WNL knee AROM expected post-operatively  2. FOTO to greater than predicted value.  3. Independent with comprehensive HEP upon discharge.  4. Pt will be able to perform ADLs, iADLS, and household duties with minimal restriction indicating return to PLOF.  5. Pt independent with rationale, technique and plan for performance of advanced HEP to ensure independent self-management of symptoms upon discharge.      Plan  Patient would benefit from: skilled physical therapy and PT eval  Referral necessary: No  Planned modality interventions: cryotherapy, biofeedback and TENS    Planned therapy interventions: activity modification, ADL retraining, joint mobilization, manual therapy, massage, balance, balance/weight bearing training, behavior modification, body mechanics training, muscle pump exercises, motor coordination training, nerve  "gliding, neuromuscular re-education, patient education, postural training, community reintegration, self care, sensory integrative techniques, strengthening, stretching, therapeutic activities, therapeutic exercise, therapeutic training, home exercise program, graded motor, graded exercise, graded activity, functional ROM exercises, gait training, IASTM, orthotic management and training and patient/caregiver education    Frequency: 2x week  Plan of Care beginning date: 2024  Plan of Care expiration date: 11/15/2024  Treatment plan discussed with: patient and family        Subjective Evaluation    History of Present Illness  Mechanism of injury: surgery  Mechanism of injury: Patient presents accompanied with spouse to outpatient physical therapy with a referral for pre-operative evaluation for LEFT TKA that they will be undergoing on 24 performed at [facility] via Guillermo Jacobo MD. Patient notes he is functional \"ok\" however pain worsens with functional transfers or repetitive loading such as stairs, kneeling, and floor transfer.     Goals: pain-free           Not a recurrent problem   Quality of life: good    Patient Goals  Patient goals for therapy: decreased pain, improved balance, increased motion, return to sport/leisure activities, independence with ADLs/IADLs, increased strength and decreased edema    Pain  Current pain ratin  At best pain ratin  At worst pain ratin  Location: L Knee Pain  Quality: dull ache, discomfort, sharp and squeezing  Relieving factors: relaxation, rest and support  Aggravating factors: stair climbing, walking and lifting    Social Support  Steps to enter house: yes  3  Lives in: one-story house  Lives with: spouse    Employment status: not working    Diagnostic Tests  No diagnostic tests performed  Treatments  Previous treatment: medication and injection treatment  Current treatment: physical therapy        Objective      Range of Motion: Goniometric revealed the " "following findings (in degrees).  Joint Motion Right: 7/11/2024 Left: 7/11/2024   Knee Extension WNL -10 P!   Knee Flexion WNL WNL P!     Strength: MMT revealed the following findings.  Joint Motion Right: 7/11/2024 Left: 7/11/2024   Knee Extension 5/5 5/5 P!   Knee Flexion 5/5 5/5 P!   Ankle Plantarflexion 5/5 5/5   Ankle Dorsiflexion 5/5 5/5     Additional Assessments:  Gait: Fair  Palpation: slight tenderness to palpation along L knee joint line, increases with end-range ROM   Balance:   Functional Capacity Tests Scores  Pre-Op   5xSTS 17.18\"   TUG                 10.25\"       Risk Assessment and Prediction Tool results reviewed. Patient reported functional outcome scores reviewed. Discussed DOS and patient's questions were answered to patient's satisfaction. Mobility/ROM results per above. Strength results per above. Balance/Gait (including Timed Up & Go) per above. Virtual Home Assessment was reviewed with patient. Home Preparation Checklist was reviewed with patient including identification of care partner and encouragement of single level set-up. Post-operative pain management expectations discussed to the patient's satisfaction. Post-operative gait training for level ground, stairs, and car transfers was performed. Patient demonstrated competence with immediate post-operative home exercise program.            Precautions: GERD, Stage 3a CKD, Memory Difficulty, Prediabetes, HL (hearing loss)     POC expires Unit limit Auth Expiration date PT/OT + Visit Limit?   11/15/24 BOMN 12/31/24 BOMN         Visit/Unit Tracking  AUTH Status:  Date 7/11        RE every 10 visits Used 1         Remaining  9           Pertinent Findings:      POC End Date: 11/15/24                                                                                          Test / Measure  7/11/24  Pre-Op   FOTO (Predicted ) At Post-Op   Knee AROM -10 to WNL   L Knee MMT 5/5   5xSTS 17.18\"   TUG  10.25\"       Access Code: [email or web address]  URL: " [email or web address]  Date: 07/11/2024  Prepared by: Radha Huynh    Exercises  - Supine Gluteal Sets  - 1 x daily - 7 x weekly - 2-3 sets - 10 reps - 10 seconds hold  - Supine Quad Set  - 3-4 x daily - 7 x weekly - 1-2 sets - 10 reps - 5-10 second hold  - Supine Heel Slide with Strap  - 3-4 x daily - 7 x weekly - 3 sets - 10 reps  - Active Straight Leg Raise with Quad Set  - 1-2 x daily - 7 x weekly - 3 sets - 10 reps  - Supine Bridge  - 1 x daily - 7 x weekly - 3 sets - 10 reps  - Seated Long Arc Quad  - 1 x daily - 7 x weekly - 3 sets - 10 reps  - Seated Ankle Pumps on Table  - 1 x daily - 7 x weekly - 3 sets - 10 reps  - Heel Raises with Counter Support  - 1 x daily - 7 x weekly - 3 sets - 10 reps      Manuals 7/11           L Knee PROM             Left Patella Mobilization             L Tibiofemoral Posterior Mob                           Neuro Re-Ed             Quadset             SLR             Eccentric Step-Downs             SL Balance             Erwin Negotiation                            Ther Ex             HEP review / edu/ Patient education/expectations AR 38'           NuStep or RB             Heel Slides + Strap Assist             Heel Raises/Toe Raises                                                                                   Ther Activity              FWD Step-Ups              LAT Step-Ups             Gait Training                                         Modalities

## 2024-07-11 ENCOUNTER — EVALUATION (OUTPATIENT)
Dept: PHYSICAL THERAPY | Facility: REHABILITATION | Age: 74
End: 2024-07-11
Payer: MEDICARE

## 2024-07-11 DIAGNOSIS — M17.12 PRIMARY OSTEOARTHRITIS OF LEFT KNEE: Primary | ICD-10-CM

## 2024-07-11 DIAGNOSIS — Z01.818 PRE-OP TESTING: ICD-10-CM

## 2024-07-11 PROCEDURE — 97110 THERAPEUTIC EXERCISES: CPT

## 2024-07-11 PROCEDURE — 97161 PT EVAL LOW COMPLEX 20 MIN: CPT

## 2024-07-11 NOTE — LETTER
2024    Guillermo Jacobo MD  [address] [place] [address]    Patient: Eddie Perez Jr.   YOB: 1950   Date of Visit: 2024     Encounter Diagnosis     ICD-10-CM    1. Primary osteoarthritis of left knee  M17.12 Ambulatory Referral to Physical Therapy      2. Pre-op testing  Z01.818           Dear Dr. Jacobo:    Thank you for your recent referral of Eddie Perez Jr.. Please review the attached evaluation summary from Eddie's recent visit.     Please verify that you agree with the plan of care by signing the attached order.     If you have any questions or concerns, please do not hesitate to call.     I sincerely appreciate the opportunity to share in the care of one of your patients and hope to have another opportunity to work with you in the near future.       Sincerely,    Radha Huynh, PT      Referring Provider:      I certify that I have read the below Plan of Care and certify the need for these services furnished under this plan of treatment while under my care.                    Guillermo Jacobo MD  [address] [place] [address]  Via Fax: [phone]          PT Evaluation     Today's date: 2024  Patient name: Eddie Perez Jr.  : 1950  MRN: [id number]  Referring provider: Guillermo Jacobo MD  Dx:   Encounter Diagnosis     ICD-10-CM    1. Primary osteoarthritis of left knee  M17.12 Ambulatory Referral to Physical Therapy      2. Pre-op testing  Z01.818           Start Time: 0915  Stop Time: 1008  Total time in clinic (min): 53 minutes    Assessment  Impairments: abnormal coordination, abnormal gait, abnormal muscle firing, abnormal muscle tone, abnormal or restricted ROM, abnormal movement, activity intolerance, impaired balance, impaired physical strength, lacks appropriate home exercise program, pain with function, safety issue, scapular dyskinesis, weight-bearing intolerance, poor posture , poor body mechanics, unable to perform ADL,  participation limitations, activity limitations and endurance  Symptom irritability: high    Assessment details: Eddie Perez Jr. is a pleasant 73 y.o. male who presents accompanied with spouse to outpatient physical therapy with a referral for pre-operative evaluation for LEFT TKA that they will be undergoing on 8/5/24 performed at [facility] via Guillemro Jacobo MD. Patient primary diagnosis is osteoarthritis of L knee. Patient presents with left knee pain when performing functional activities such as prolonged loading activities, kneeling and stair negotiation. Patient for the most part is very functional, however, notes pain continues to worsen with overuse/loading. Upon further clinical testing, patient demonstrated the following deficits: knee motor dysfunction, lack of TKE, decrease neuromuscular control, weight bearing intolerance, antalgic gait, and regressing in functional transfers (floor to stance, STS). Patient was provided with a Total Joint Pre-Op HEP which will be reviewed in the upcoming post-op session. Educated pt to stop any exercises causing pain increase, pt verbalizes understanding. Pt was educated on expectations and post-op protocols with demonstrated verbal understanding. Pt would benefit from skilled OP physical therapy to address these, and the below impairments in order to optimize outcomes and promote return to functional baseline.       Patient verbalized understanding of POC.     Please contact me if you have any questions or recommendations. Thank you for the referral and the opportunity to share in Eddie's care      Barriers to intervention: medical complexity  Understanding of Dx/Px/POC: good     Prognosis: good    Goals  Short Term Goals:  In 4-6 weeks, the patient will:  1. Pt will report at least a 25% reduction in subjective pain complaints/symptoms to better manage ADLs and household chores.   2. Pt will have improve atleast half a grade per LE MMT  3. Pt independent with  initial HEP, rationale, technique and frequency, for ROM and pain control.  4. Pt will have gain 50% improvement in knee AROM  5. Pt will be ambulating with least restrictive AD for household distances        Long Term Goals:  In 12 weeks, the patient will:  1. Pt will have atleast 4/5 graded MMT and WNL knee AROM expected post-operatively  2. FOTO to greater than predicted value.  3. Independent with comprehensive HEP upon discharge.  4. Pt will be able to perform ADLs, iADLS, and household duties with minimal restriction indicating return to PLOF.  5. Pt independent with rationale, technique and plan for performance of advanced HEP to ensure independent self-management of symptoms upon discharge.      Plan  Patient would benefit from: skilled physical therapy and PT eval  Referral necessary: No  Planned modality interventions: cryotherapy, biofeedback and TENS    Planned therapy interventions: activity modification, ADL retraining, joint mobilization, manual therapy, massage, balance, balance/weight bearing training, behavior modification, body mechanics training, muscle pump exercises, motor coordination training, nerve gliding, neuromuscular re-education, patient education, postural training, community reintegration, self care, sensory integrative techniques, strengthening, stretching, therapeutic activities, therapeutic exercise, therapeutic training, home exercise program, graded motor, graded exercise, graded activity, functional ROM exercises, gait training, IASTM, orthotic management and training and patient/caregiver education    Frequency: 2x week  Plan of Care beginning date: 8/9/2024  Plan of Care expiration date: 11/15/2024  Treatment plan discussed with: patient and family        Subjective Evaluation    History of Present Illness  Mechanism of injury: surgery  Mechanism of injury: Patient presents accompanied with spouse to outpatient physical therapy with a referral for pre-operative evaluation for  "LEFT TKA that they will be undergoing on 24 performed at [facility] via Guillermo Jacobo MD. Patient notes he is functional \"ok\" however pain worsens with functional transfers or repetitive loading such as stairs, kneeling, and floor transfer.     Goals: pain-free           Not a recurrent problem   Quality of life: good    Patient Goals  Patient goals for therapy: decreased pain, improved balance, increased motion, return to sport/leisure activities, independence with ADLs/IADLs, increased strength and decreased edema    Pain  Current pain ratin  At best pain ratin  At worst pain ratin  Location: L Knee Pain  Quality: dull ache, discomfort, sharp and squeezing  Relieving factors: relaxation, rest and support  Aggravating factors: stair climbing, walking and lifting    Social Support  Steps to enter house: yes  3  Lives in: one-story house  Lives with: spouse    Employment status: not working    Diagnostic Tests  No diagnostic tests performed  Treatments  Previous treatment: medication and injection treatment  Current treatment: physical therapy        Objective      Range of Motion: Goniometric revealed the following findings (in degrees).  Joint Motion Right: 2024 Left: 2024   Knee Extension WNL -10 P!   Knee Flexion WNL WNL P!     Strength: MMT revealed the following findings.  Joint Motion Right: 2024 Left: 2024   Knee Extension 5/5 5/5 P!   Knee Flexion 5/5 5/5 P!   Ankle Plantarflexion 5/5 5/5   Ankle Dorsiflexion 5/5 5/5     Additional Assessments:  Gait: Fair  Palpation: slight tenderness to palpation along L knee joint line, increases with end-range ROM   Balance:   Functional Capacity Tests Scores  Pre-Op   5xSTS 17.18\"   TUG                 10.25\"       Risk Assessment and Prediction Tool results reviewed. Patient reported functional outcome scores reviewed. Discussed DOS and patient's questions were answered to patient's satisfaction. Mobility/ROM results per above. Strength " "results per above. Balance/Gait (including Timed Up & Go) per above. Virtual Home Assessment was reviewed with patient. Home Preparation Checklist was reviewed with patient including identification of care partner and encouragement of single level set-up. Post-operative pain management expectations discussed to the patient's satisfaction. Post-operative gait training for level ground, stairs, and car transfers was performed. Patient demonstrated competence with immediate post-operative home exercise program.            Precautions: GERD, Stage 3a CKD, Memory Difficulty, Prediabetes, HL (hearing loss)     POC expires Unit limit Auth Expiration date PT/OT + Visit Limit?   11/15/24 BOMN 12/31/24 BOMN         Visit/Unit Tracking  AUTH Status:  Date 7/11        RE every 10 visits Used 1         Remaining  9           Pertinent Findings:      POC End Date: 11/15/24                                                                                          Test / Measure  7/11/24  Pre-Op   FOTO (Predicted ) At Post-Op   Knee AROM -10 to WNL   L Knee MMT 5/5   5xSTS 17.18\"   TUG  10.25\"       Access Code: [email or web address]  URL: [email or web address]  Date: 07/11/2024  Prepared by: Radha Huynh    Exercises  - Supine Gluteal Sets  - 1 x daily - 7 x weekly - 2-3 sets - 10 reps - 10 seconds hold  - Supine Quad Set  - 3-4 x daily - 7 x weekly - 1-2 sets - 10 reps - 5-10 second hold  - Supine Heel Slide with Strap  - 3-4 x daily - 7 x weekly - 3 sets - 10 reps  - Active Straight Leg Raise with Quad Set  - 1-2 x daily - 7 x weekly - 3 sets - 10 reps  - Supine Bridge  - 1 x daily - 7 x weekly - 3 sets - 10 reps  - Seated Long Arc Quad  - 1 x daily - 7 x weekly - 3 sets - 10 reps  - Seated Ankle Pumps on Table  - 1 x daily - 7 x weekly - 3 sets - 10 reps  - Heel Raises with Counter Support  - 1 x daily - 7 x weekly - 3 sets - 10 reps      Manuals 7/11           L Knee PROM             Left Patella Mobilization             L " Tibiofemoral Posterior Mob                           Neuro Re-Ed             Quadset             SLR             Eccentric Step-Downs             SL Balance             Erwin Negotiation                            Ther Ex             HEP review / edu/ Patient education/expectations AR 38'           NuStep or RB             Heel Slides + Strap Assist             Heel Raises/Toe Raises                                                                                   Ther Activity              FWD Step-Ups              LAT Step-Ups             Gait Training                                         Modalities

## 2024-07-15 ENCOUNTER — APPOINTMENT (OUTPATIENT)
Dept: LAB | Facility: CLINIC | Age: 74
End: 2024-07-15
Payer: MEDICARE

## 2024-07-15 DIAGNOSIS — Z01.89 ENCOUNTER FOR OTHER SPECIFIED SPECIAL EXAMINATIONS: ICD-10-CM

## 2024-07-15 DIAGNOSIS — Z01.818 ENCOUNTER FOR PRE-OPERATIVE EXAMINATION: ICD-10-CM

## 2024-07-15 LAB
ALBUMIN SERPL BCG-MCNC: 4.4 G/DL (ref 3.5–5)
ALP SERPL-CCNC: 32 U/L (ref 34–104)
ALT SERPL W P-5'-P-CCNC: 35 U/L (ref 7–52)
ANION GAP SERPL CALCULATED.3IONS-SCNC: 12 MMOL/L (ref 4–13)
AST SERPL W P-5'-P-CCNC: 29 U/L (ref 13–39)
BASOPHILS # BLD AUTO: 0.1 THOUSANDS/ÂΜL (ref 0–0.1)
BASOPHILS NFR BLD AUTO: 1 % (ref 0–1)
BILIRUB SERPL-MCNC: 0.35 MG/DL (ref 0.2–1)
BUN SERPL-MCNC: 18 MG/DL (ref 5–25)
CALCIUM SERPL-MCNC: 9.6 MG/DL (ref 8.4–10.2)
CHLORIDE SERPL-SCNC: 103 MMOL/L (ref 96–108)
CO2 SERPL-SCNC: 27 MMOL/L (ref 21–32)
CREAT SERPL-MCNC: 1.16 MG/DL (ref 0.6–1.3)
EOSINOPHIL # BLD AUTO: 0.24 THOUSAND/ÂΜL (ref 0–0.61)
EOSINOPHIL NFR BLD AUTO: 3 % (ref 0–6)
ERYTHROCYTE [DISTWIDTH] IN BLOOD BY AUTOMATED COUNT: 13.8 % (ref 11.6–15.1)
GFR SERPL CREATININE-BSD FRML MDRD: 62 ML/MIN/1.73SQ M
GLUCOSE P FAST SERPL-MCNC: 90 MG/DL (ref 65–99)
HCT VFR BLD AUTO: 45.4 % (ref 36.5–49.3)
HGB BLD-MCNC: 14.9 G/DL (ref 12–17)
IMM GRANULOCYTES # BLD AUTO: 0.02 THOUSAND/UL (ref 0–0.2)
IMM GRANULOCYTES NFR BLD AUTO: 0 % (ref 0–2)
LYMPHOCYTES # BLD AUTO: 2.88 THOUSANDS/ÂΜL (ref 0.6–4.47)
LYMPHOCYTES NFR BLD AUTO: 38 % (ref 14–44)
MCH RBC QN AUTO: 30.1 PG (ref 26.8–34.3)
MCHC RBC AUTO-ENTMCNC: 32.8 G/DL (ref 31.4–37.4)
MCV RBC AUTO: 92 FL (ref 82–98)
MONOCYTES # BLD AUTO: 0.79 THOUSAND/ÂΜL (ref 0.17–1.22)
MONOCYTES NFR BLD AUTO: 10 % (ref 4–12)
NEUTROPHILS # BLD AUTO: 3.54 THOUSANDS/ÂΜL (ref 1.85–7.62)
NEUTS SEG NFR BLD AUTO: 48 % (ref 43–75)
NRBC BLD AUTO-RTO: 0 /100 WBCS
PLATELET # BLD AUTO: 245 THOUSANDS/UL (ref 149–390)
PMV BLD AUTO: 12.3 FL (ref 8.9–12.7)
POTASSIUM SERPL-SCNC: 3.7 MMOL/L (ref 3.5–5.3)
PROT SERPL-MCNC: 7.4 G/DL (ref 6.4–8.4)
RBC # BLD AUTO: 4.95 MILLION/UL (ref 3.88–5.62)
SODIUM SERPL-SCNC: 142 MMOL/L (ref 135–147)
WBC # BLD AUTO: 7.57 THOUSAND/UL (ref 4.31–10.16)

## 2024-07-15 PROCEDURE — 36415 COLL VENOUS BLD VENIPUNCTURE: CPT

## 2024-07-15 PROCEDURE — 80053 COMPREHEN METABOLIC PANEL: CPT

## 2024-07-15 PROCEDURE — 85025 COMPLETE CBC W/AUTO DIFF WBC: CPT

## 2024-07-23 NOTE — PROGRESS NOTES
"Daily Note     Today's date: 2024  Patient name: Eddie Perez Jr.  : 1950  MRN: [id number]  Referring provider: Edwin Goodrich PA*  Dx:   Encounter Diagnosis     ICD-10-CM    1. Primary osteoarthritis of left knee  M17.12       2. Pre-op testing  Z01.818           Start Time: 1000  Stop Time: 1026  Total time in clinic (min): 26 minutes    Subjective: Patient brings questions regarding HEP and pre-TKA prep.      Objective: See treatment diary below      Assessment: Patient tolerated treatment session well with reviewing post-op TKA. Answered all questions from patient and spouse regarding post-op prep for TKA.       Plan:  Re-Evaluation s/p TKA     Precautions: GERD, Stage 3a CKD, Memory Difficulty, Prediabetes, HL (hearing loss)     POC expires Unit limit Auth Expiration date PT/OT + Visit Limit?   11/15/24 BOMN 24 BOMN         Visit/Unit Tracking  AUTH Status:  Date        RE every 10 visits Used 1 2        Remaining  9 8          Pertinent Findings:      POC End Date: 11/15/24                                                                                          Test / Measure  24  Pre-Op   FOTO (Predicted ) At Post-Op   Knee AROM -10 to WNL   L Knee MMT 5/5   5xSTS 17.18\"   TUG  10.25\"       Access Code: [email or web address]  URL: [email or web address]  Date: 2024  Prepared by: Radha Huynh    Exercises  - Supine Gluteal Sets  - 1 x daily - 7 x weekly - 2-3 sets - 10 reps - 10 seconds hold  - Supine Quad Set  - 3-4 x daily - 7 x weekly - 1-2 sets - 10 reps - 5-10 second hold  - Supine Heel Slide with Strap  - 3-4 x daily - 7 x weekly - 3 sets - 10 reps  - Active Straight Leg Raise with Quad Set  - 1-2 x daily - 7 x weekly - 3 sets - 10 reps  - Supine Bridge  - 1 x daily - 7 x weekly - 3 sets - 10 reps  - Seated Long Arc Quad  - 1 x daily - 7 x weekly - 3 sets - 10 reps  - Seated Ankle Pumps on Table  - 1 x daily - 7 x weekly - 3 sets - 10 reps  - Heel Raises " with Counter Support  - 1 x daily - 7 x weekly - 3 sets - 10 reps      Manuals 7/11 7/26         L Knee PROM             Left Patella Mobilization             L Tibiofemoral Posterior Mob                           Neuro Re-Ed             Quadset             SLR             Eccentric Step-Downs             SL Balance             Erwin Negotiation                            Ther Ex             HEP review / edu/ Patient education/expectations AR 38' AR 26'         NuStep or RB             Heel Slides + Strap Assist             Heel Raises/Toe Raises                                                                                   Ther Activity              FWD Step-Ups              LAT Step-Ups             Gait Training                                         Modalities

## 2024-07-26 ENCOUNTER — OFFICE VISIT (OUTPATIENT)
Dept: PHYSICAL THERAPY | Facility: REHABILITATION | Age: 74
End: 2024-07-26
Payer: MEDICARE

## 2024-07-26 DIAGNOSIS — Z01.818 PRE-OP TESTING: ICD-10-CM

## 2024-07-26 DIAGNOSIS — M17.12 PRIMARY OSTEOARTHRITIS OF LEFT KNEE: Primary | ICD-10-CM

## 2024-07-26 PROCEDURE — 97110 THERAPEUTIC EXERCISES: CPT

## 2024-07-31 ENCOUNTER — CONSULT (OUTPATIENT)
Dept: FAMILY MEDICINE CLINIC | Facility: CLINIC | Age: 74
End: 2024-07-31
Payer: MEDICARE

## 2024-07-31 ENCOUNTER — TELEPHONE (OUTPATIENT)
Age: 74
End: 2024-07-31

## 2024-07-31 VITALS
SYSTOLIC BLOOD PRESSURE: 140 MMHG | OXYGEN SATURATION: 96 % | BODY MASS INDEX: 30.05 KG/M2 | DIASTOLIC BLOOD PRESSURE: 82 MMHG | HEIGHT: 66 IN | HEART RATE: 84 BPM | TEMPERATURE: 97.3 F | RESPIRATION RATE: 18 BRPM | WEIGHT: 187 LBS

## 2024-07-31 DIAGNOSIS — M25.562 CHRONIC PAIN OF LEFT KNEE: ICD-10-CM

## 2024-07-31 DIAGNOSIS — E78.2 MIXED HYPERLIPIDEMIA: ICD-10-CM

## 2024-07-31 DIAGNOSIS — G89.29 CHRONIC PAIN OF LEFT KNEE: ICD-10-CM

## 2024-07-31 DIAGNOSIS — Z01.818 PREOP EXAMINATION: Primary | ICD-10-CM

## 2024-07-31 DIAGNOSIS — K21.9 GASTROESOPHAGEAL REFLUX DISEASE, UNSPECIFIED WHETHER ESOPHAGITIS PRESENT: ICD-10-CM

## 2024-07-31 PROCEDURE — 93000 ELECTROCARDIOGRAM COMPLETE: CPT | Performed by: FAMILY MEDICINE

## 2024-07-31 PROCEDURE — 99214 OFFICE O/P EST MOD 30 MIN: CPT | Performed by: FAMILY MEDICINE

## 2024-07-31 RX ORDER — AZELASTINE 1 MG/ML
1 SPRAY, METERED NASAL 2 TIMES DAILY
COMMUNITY

## 2024-07-31 RX ORDER — CELECOXIB 200 MG/1
CAPSULE ORAL
COMMUNITY
Start: 2024-07-02

## 2024-07-31 NOTE — PROGRESS NOTES
FAMILY MEDICINE PRE-OPERATIVE EVALUATION  [facility]    NAME: Eddie Perez Jr.  AGE: 73 y.o. SEX: male  : 1950     DATE: 2024     Internal Medicine Pre-Operative Evaluation:     Chief Complaint: Pre-operative Evaluation     Surgery: Left knee replacement  Anticipated Date of Surgery: 2024  Referring Provider: Dr. Jacobo       History of Present Illness:     Eddie Perez Jr. is a 73 y.o. male who presents to the office today for a preoperative consultation at the request of surgeon, Dr. Jacobo , who plans on performing left knee replacement on 2024. Planned anesthesia is general. Patient has a bleeding risk of: no recent abnormal bleeding. Patient does not have objections to receiving blood products if needed. Current anti-platelet/anti-coagulation medications that the patient is prescribed includes:  none .      GERD----He is on pepcid 20mg QD.     Hyperlipidemia---He is on fenofibrate and zetia.     No smoking.   No alcohol.       Assessment of Chronic Conditions:   - see above     Assessment of Cardiac Risk:  Denies unstable or severe angina or MI in the last 6 weeks or history of stent placement in the last year   Denies decompensated heart failure (e.g. New onset heart failure, NYHA functional class IV heart failure, or worsening existing heart failure)  Denies significant arrhythmias such as high grade AV block, symptomatic ventricular arrhythmia, newly recognized ventricular tachycardia, supraventricular tachycardia with resting heart rate >100, or symptomatic bradycardia  Denies severe heart valve disease including aortic stenosis or symptomatic mitral stenosis     Exercise Capacity:  Able to walk 4 blocks without symptoms?: Yes  Able to walk 2 flights without symptoms?: Yes    Prior Anesthesia Reactions: No     Personal history of venous thromboembolic disease? No    History of steroid use for >2 weeks within last year?  No    STOP-BANG Sleep Apnea Screening Questionnaire:      Do you SNORE loudly (louder than talking or loud enough to be heard through closed doors)? Yes = 1 point   Do you often feel TIRED, fatigued, or sleepy during daytime? No = 0 point   Has anyone OBSERVED you stop breathing during your sleep? No = 0 point   Do you have or are you being treated for high blood pressure? No = 0 point   BMI more than 35 kg/m2? No = 0 point   AGE over 50 years old? Yes = 1 point   NECK circumference > 16 inches (40 cm)? No = 0 point   Male GENDER? Yes = 1 point   TOTAL SCORE 3 = INTERMEDIATE risk of NOEMÍ       Review of Systems:     Review of Systems   Constitutional:  Negative for appetite change, chills and fever.   HENT:  Negative for congestion, ear pain, sinus pain and sore throat.    Eyes:  Negative for discharge and itching.   Respiratory:  Negative for apnea, cough, chest tightness, shortness of breath and wheezing.    Cardiovascular:  Negative for chest pain, palpitations and leg swelling.   Gastrointestinal:  Negative for abdominal pain, anal bleeding, constipation, diarrhea, nausea and vomiting.   Endocrine: Negative for cold intolerance, heat intolerance and polyuria.   Genitourinary:  Negative for difficulty urinating and dysuria.   Musculoskeletal:  Positive for arthralgias. Negative for back pain and myalgias.   Skin:  Negative for rash.   Neurological:  Negative for dizziness and headaches.   Psychiatric/Behavioral:  Negative for agitation.         Problem List:     Patient Active Problem List   Diagnosis    Mixed hyperlipidemia    Prediabetes    Nephrolithiasis    BPH associated with nocturia    Elevated PSA    GERD (gastroesophageal reflux disease)    Groin pain, left    Non-healing skin lesion of nose    Status post left inguinal hernia repair    Chronic pain of left knee    Memory difficulty    Acute left ankle pain    Posterior tibial tendon dysfunction (PTTD) of left lower extremity    Stage 3a chronic kidney  disease (HCC)        Allergies:     Allergies   Allergen Reactions    Other Allergic Rhinitis     Environmental        Current Medications:       Current Outpatient Medications:     azelastine (ASTELIN) 0.1 % nasal spray, 1 spray into each nostril 2 (two) times a day Use in each nostril as directed, Disp: , Rfl:     Calcium Carb-Cholecalciferol 600-800 MG-UNIT TABS, Take by mouth daily , Disp: , Rfl:     celecoxib (CeleBREX) 200 mg capsule, PLEASE SEE ATTACHED FOR DETAILED DIRECTIONS, Disp: , Rfl:     ezetimibe (ZETIA) 10 mg tablet, Take 1 tablet (10 mg total) by mouth daily, Disp: 90 tablet, Rfl: 3    famotidine (PEPCID) 20 mg tablet, TAKE 1 TABLET TWICE DAILY BEFORE MEALS, Disp: 180 tablet, Rfl: 1    fenofibrate 160 MG tablet, Take 1 tablet (160 mg total) by mouth daily, Disp: 90 tablet, Rfl: 3    Glucosamine-Chondroitin--400-375 MG TABS, Take by mouth daily , Disp: , Rfl:     multivitamin (THERAGRAN) TABS, Take by mouth, Disp: , Rfl:     ipratropium (ATROVENT) 0.06 % nasal spray, USE 2 SPRAYS IN EACH NOSTRIL TWICE DAILY (Patient not taking: Reported on 7/31/2024), Disp: 75 mL, Rfl: 3     Past History:     Past Medical History:   Diagnosis Date    Allergic rhinitis     BPH (benign prostatic hyperplasia)     GERD (gastroesophageal reflux disease)     HL (hearing loss)     Wears hearing aids    Hyperlipidemia     Inguinal hernia of left side with obstruction     Nephrolithiasis     Tinnitus         Past Surgical History:   Procedure Laterality Date    ADENOIDECTOMY      INGUINAL HERNIA REPAIR      AR RPR 1ST INGUN HRNA AGE 5 YRS/> REDUCIBLE Left 10/14/2021    Procedure: REPAIR HERNIA INGUINAL;  Surgeon: Juan Pereira MD;  Location: [facility];  Service: General    TONSILLECTOMY      WISDOM TOOTH EXTRACTION          Family History   Problem Relation Age of Onset    Skin cancer Father         Social History     Socioeconomic History    Marital status: /Civil Union     Spouse name: Not on file     "Number of children: Not on file    Years of education: Not on file    Highest education level: Not on file   Occupational History    Not on file   Tobacco Use    Smoking status: Never     Passive exposure: Never    Smokeless tobacco: Never   Vaping Use    Vaping status: Never Used   Substance and Sexual Activity    Alcohol use: No    Drug use: No    Sexual activity: Not Currently   Other Topics Concern    Not on file   Social History Narrative    Not on file     Social Determinants of Health     Financial Resource Strain: Patient Declined (3/5/2024)    Overall Financial Resource Strain (CARDIA)     Difficulty of Paying Living Expenses: Patient declined   Food Insecurity: Not on file   Transportation Needs: No Transportation Needs (3/5/2024)    PRAPARE - Transportation     Lack of Transportation (Medical): No     Lack of Transportation (Non-Medical): No   Physical Activity: Not on file   Stress: Not on file   Social Connections: Not on file   Intimate Partner Violence: Not on file   Housing Stability: Not on file        Physical Exam:      /82   Pulse 84   Temp (!) 97.3 °F (36.3 °C) (Temporal)   Resp 18   Ht 5' 6\" (1.676 m)   Wt 84.8 kg (187 lb)   SpO2 96%   BMI 30.18 kg/m²     Physical Exam  Constitutional:       Appearance: He is well-developed.   HENT:      Head: Normocephalic and atraumatic.   Eyes:      General:         Right eye: No discharge.         Left eye: No discharge.      Conjunctiva/sclera: Conjunctivae normal.   Cardiovascular:      Rate and Rhythm: Normal rate and regular rhythm.      Heart sounds: Normal heart sounds. No murmur heard.     No friction rub. No gallop.   Pulmonary:      Effort: Pulmonary effort is normal. No respiratory distress.      Breath sounds: Normal breath sounds. No wheezing or rales.   Abdominal:      General: Bowel sounds are normal. There is no distension.      Palpations: Abdomen is soft.      Tenderness: There is no abdominal tenderness. There is no guarding. "   Musculoskeletal:         General: Normal range of motion.      Cervical back: Normal range of motion and neck supple.      Right lower leg: No edema.      Left lower leg: No edema.   Neurological:      Mental Status: He is alert.           Data:     Pre-operative work-up    Laboratory Results: I have personally reviewed the pertinent laboratory results/reports     7/15/2024 CBC and CMP normal    EKG: I have personally reviewed pertinent reports.    EKG today showed sinus rhythm, no acute ST-T change. Compare to EKG on 20/4/2021, no significant change.     Chest x-ray: I have personally reviewed pertinent reports.    7/2/2024 CXR normal         Assessment:     1. Preop examination  POCT ECG      2. Chronic pain of left knee        3. Gastroesophageal reflux disease, unspecified whether esophagitis present        4. Mixed hyperlipidemia             Plan:     73 y.o. male with planned surgery: left knee replacement.      Cardiac Risk Estimation: per the Revised Cardiac Risk Index (Circ. 100:1043, 1999), the patient's risk factors for cardiac complications include  none , putting him in: RCI RISK CLASS I (0 risk factors, risk of major cardiac compl. appr. 0.5%).    1. Further preoperative workup as follows:   - None; no further preoperative work-up is required    2. Medication Management/Recommendations:   - Patient has been instructed to avoid aspirin containing medications or non-steroidal anti-inflammatory drugs for the week preceding surgery.  Hold celebrex 1 week prior to surgery.     3. Prophylaxis for cardiac events with perioperative beta-blockers: not indicated.    4. Patient requires further consultation with: None    Clearance  Patient is CLEARED for surgery without any additional cardiac testing.     Bogdan Solis MD  [address] [address]  Phone#  [phone]  Fax#  [phone]

## 2024-07-31 NOTE — TELEPHONE ENCOUNTER
Orthopedic Surgery calling requesting EKG lead to be faxed over to office did not receive copy. Not scanned into chart.      Fax # [phone] ATTN: Anabell    Please review.  Thank you

## 2024-07-31 NOTE — LETTER
2024     Guillermo Jacobo MD  [address] [facility] [address]    Patient: Eddie Perez Jr.   YOB: 1950   Date of Visit: 2024       Dear Dr. Jacobo:    Thank you for referring Eddie Perez to me for evaluation. Below are my notes for this consultation.    If you have questions, please do not hesitate to call me. I look forward to following your patient along with you.         Sincerely,        Bogdan Solis MD        CC: No Recipients    Bogdan Solis MD  2024  3:02 PM  Sign when Signing Visit  FAMILY MEDICINE PRE-OPERATIVE EVALUATION  [facility]    NAME: Eddie Perez Jr.  AGE: 73 y.o. SEX: male  : 1950     DATE: 2024     Internal Medicine Pre-Operative Evaluation:     Chief Complaint: Pre-operative Evaluation     Surgery: Left knee replacement  Anticipated Date of Surgery: 2024  Referring Provider: Dr. Jacobo       History of Present Illness:     Eddie Perez Jr. is a 73 y.o. male who presents to the office today for a preoperative consultation at the request of surgeon, Dr. Jacobo , who plans on performing left knee replacement on 2024. Planned anesthesia is general. Patient has a bleeding risk of: no recent abnormal bleeding. Patient does not have objections to receiving blood products if needed. Current anti-platelet/anti-coagulation medications that the patient is prescribed includes:  none .      GERD----He is on pepcid 20mg QD.     Hyperlipidemia---He is on fenofibrate and zetia.     No smoking.   No alcohol.       Assessment of Chronic Conditions:   - see above     Assessment of Cardiac Risk:  Denies unstable or severe angina or MI in the last 6 weeks or history of stent placement in the last year   Denies decompensated heart failure (e.g. New onset heart failure, NYHA functional class IV heart failure, or worsening existing heart failure)  Denies significant arrhythmias such as  high grade AV block, symptomatic ventricular arrhythmia, newly recognized ventricular tachycardia, supraventricular tachycardia with resting heart rate >100, or symptomatic bradycardia  Denies severe heart valve disease including aortic stenosis or symptomatic mitral stenosis     Exercise Capacity:  Able to walk 4 blocks without symptoms?: Yes  Able to walk 2 flights without symptoms?: Yes    Prior Anesthesia Reactions: No     Personal history of venous thromboembolic disease? No    History of steroid use for >2 weeks within last year? No    STOP-BANG Sleep Apnea Screening Questionnaire:      Do you SNORE loudly (louder than talking or loud enough to be heard through closed doors)? Yes = 1 point   Do you often feel TIRED, fatigued, or sleepy during daytime? No = 0 point   Has anyone OBSERVED you stop breathing during your sleep? No = 0 point   Do you have or are you being treated for high blood pressure? No = 0 point   BMI more than 35 kg/m2? No = 0 point   AGE over 50 years old? Yes = 1 point   NECK circumference > 16 inches (40 cm)? No = 0 point   Male GENDER? Yes = 1 point   TOTAL SCORE 3 = INTERMEDIATE risk of NOEMÍ       Review of Systems:     Review of Systems   Constitutional:  Negative for appetite change, chills and fever.   HENT:  Negative for congestion, ear pain, sinus pain and sore throat.    Eyes:  Negative for discharge and itching.   Respiratory:  Negative for apnea, cough, chest tightness, shortness of breath and wheezing.    Cardiovascular:  Negative for chest pain, palpitations and leg swelling.   Gastrointestinal:  Negative for abdominal pain, anal bleeding, constipation, diarrhea, nausea and vomiting.   Endocrine: Negative for cold intolerance, heat intolerance and polyuria.   Genitourinary:  Negative for difficulty urinating and dysuria.   Musculoskeletal:  Positive for arthralgias. Negative for back pain and myalgias.   Skin:  Negative for rash.   Neurological:  Negative for dizziness and  headaches.   Psychiatric/Behavioral:  Negative for agitation.         Problem List:     Patient Active Problem List   Diagnosis   • Mixed hyperlipidemia   • Prediabetes   • Nephrolithiasis   • BPH associated with nocturia   • Elevated PSA   • GERD (gastroesophageal reflux disease)   • Groin pain, left   • Non-healing skin lesion of nose   • Status post left inguinal hernia repair   • Chronic pain of left knee   • Memory difficulty   • Acute left ankle pain   • Posterior tibial tendon dysfunction (PTTD) of left lower extremity   • Stage 3a chronic kidney disease (HCC)        Allergies:     Allergies   Allergen Reactions   • Other Allergic Rhinitis     Environmental        Current Medications:       Current Outpatient Medications:   •  azelastine (ASTELIN) 0.1 % nasal spray, 1 spray into each nostril 2 (two) times a day Use in each nostril as directed, Disp: , Rfl:   •  Calcium Carb-Cholecalciferol 600-800 MG-UNIT TABS, Take by mouth daily , Disp: , Rfl:   •  celecoxib (CeleBREX) 200 mg capsule, PLEASE SEE ATTACHED FOR DETAILED DIRECTIONS, Disp: , Rfl:   •  ezetimibe (ZETIA) 10 mg tablet, Take 1 tablet (10 mg total) by mouth daily, Disp: 90 tablet, Rfl: 3  •  famotidine (PEPCID) 20 mg tablet, TAKE 1 TABLET TWICE DAILY BEFORE MEALS, Disp: 180 tablet, Rfl: 1  •  fenofibrate 160 MG tablet, Take 1 tablet (160 mg total) by mouth daily, Disp: 90 tablet, Rfl: 3  •  Glucosamine-Chondroitin--400-375 MG TABS, Take by mouth daily , Disp: , Rfl:   •  multivitamin (THERAGRAN) TABS, Take by mouth, Disp: , Rfl:   •  ipratropium (ATROVENT) 0.06 % nasal spray, USE 2 SPRAYS IN EACH NOSTRIL TWICE DAILY (Patient not taking: Reported on 7/31/2024), Disp: 75 mL, Rfl: 3     Past History:     Past Medical History:   Diagnosis Date   • Allergic rhinitis    • BPH (benign prostatic hyperplasia)    • GERD (gastroesophageal reflux disease)    • HL (hearing loss)     Wears hearing aids   • Hyperlipidemia    • Inguinal hernia of left side  "with obstruction    • Nephrolithiasis    • Tinnitus         Past Surgical History:   Procedure Laterality Date   • ADENOIDECTOMY     • INGUINAL HERNIA REPAIR     • ND RPR 1ST INGUN HRNA AGE 5 YRS/> REDUCIBLE Left 10/14/2021    Procedure: REPAIR HERNIA INGUINAL;  Surgeon: Juan Pereira MD;  Location: [facility];  Service: General   • TONSILLECTOMY     • WISDOM TOOTH EXTRACTION          Family History   Problem Relation Age of Onset   • Skin cancer Father         Social History     Socioeconomic History   • Marital status: /Civil Union     Spouse name: Not on file   • Number of children: Not on file   • Years of education: Not on file   • Highest education level: Not on file   Occupational History   • Not on file   Tobacco Use   • Smoking status: Never     Passive exposure: Never   • Smokeless tobacco: Never   Vaping Use   • Vaping status: Never Used   Substance and Sexual Activity   • Alcohol use: No   • Drug use: No   • Sexual activity: Not Currently   Other Topics Concern   • Not on file   Social History Narrative   • Not on file     Social Determinants of Health     Financial Resource Strain: Patient Declined (3/5/2024)    Overall Financial Resource Strain (CARDIA)    • Difficulty of Paying Living Expenses: Patient declined   Food Insecurity: Not on file   Transportation Needs: No Transportation Needs (3/5/2024)    PRAPARE - Transportation    • Lack of Transportation (Medical): No    • Lack of Transportation (Non-Medical): No   Physical Activity: Not on file   Stress: Not on file   Social Connections: Not on file   Intimate Partner Violence: Not on file   Housing Stability: Not on file        Physical Exam:      /82   Pulse 84   Temp (!) 97.3 °F (36.3 °C) (Temporal)   Resp 18   Ht 5' 6\" (1.676 m)   Wt 84.8 kg (187 lb)   SpO2 96%   BMI 30.18 kg/m²     Physical Exam  Constitutional:       Appearance: He is well-developed.   HENT:      Head: Normocephalic and atraumatic.   Eyes:      General:   "       Right eye: No discharge.         Left eye: No discharge.      Conjunctiva/sclera: Conjunctivae normal.   Cardiovascular:      Rate and Rhythm: Normal rate and regular rhythm.      Heart sounds: Normal heart sounds. No murmur heard.     No friction rub. No gallop.   Pulmonary:      Effort: Pulmonary effort is normal. No respiratory distress.      Breath sounds: Normal breath sounds. No wheezing or rales.   Abdominal:      General: Bowel sounds are normal. There is no distension.      Palpations: Abdomen is soft.      Tenderness: There is no abdominal tenderness. There is no guarding.   Musculoskeletal:         General: Normal range of motion.      Cervical back: Normal range of motion and neck supple.      Right lower leg: No edema.      Left lower leg: No edema.   Neurological:      Mental Status: He is alert.           Data:     Pre-operative work-up    Laboratory Results: I have personally reviewed the pertinent laboratory results/reports     7/15/2024 CBC and CMP normal    EKG: I have personally reviewed pertinent reports.    EKG today showed sinus rhythm, no acute ST-T change. Compare to EKG on 20/4/2021, no significant change.     Chest x-ray: I have personally reviewed pertinent reports.    7/2/2024 CXR normal         Assessment:     1. Preop examination  POCT ECG      2. Chronic pain of left knee        3. Gastroesophageal reflux disease, unspecified whether esophagitis present        4. Mixed hyperlipidemia             Plan:     73 y.o. male with planned surgery: left knee replacement.      Cardiac Risk Estimation: per the Revised Cardiac Risk Index (Circ. 100:1043, 1999), the patient's risk factors for cardiac complications include  none , putting him in: RCI RISK CLASS I (0 risk factors, risk of major cardiac compl. appr. 0.5%).    1. Further preoperative workup as follows:   - None; no further preoperative work-up is required    2. Medication Management/Recommendations:   - Patient has been  instructed to avoid aspirin containing medications or non-steroidal anti-inflammatory drugs for the week preceding surgery.  Hold celebrex 1 week prior to surgery.     3. Prophylaxis for cardiac events with perioperative beta-blockers: not indicated.    4. Patient requires further consultation with: None    Clearance  Patient is CLEARED for surgery without any additional cardiac testing.     Bogdan Solis MD  [address] [address]  Phone#  [phone]  Fax#  [phone]

## 2024-07-31 NOTE — PROGRESS NOTES
"Ambulatory Visit  Name: Eddie Perez Jr.      : 1950      MRN: [id number]  Encounter Provider: Bogdan Solis MD  Encounter Date: 2024   Encounter department: [facility]  Chief Complaint   Patient presents with    Pre-op Exam     Dr. Guillermo Jacobo, Left total knee replacement, EKG/labs-yes        Health Maintenance   Topic Date Due    Zoster Vaccine (1 of 2) Never done    RSV Vaccine Age 60+ Years (1 - 1-dose 60+ series) Never done    Pneumococcal Vaccine: 65+ Years (1 of 1 - PCV) Never done    Colorectal Cancer Screening  2023    COVID-19 Vaccine (1 - -24 season) Never done    Influenza Vaccine (1) 2024    PT PLAN OF CARE  08/10/2024    Depression Screening  2025    Medicare Annual Wellness Visit (AWV)  2025    Fall Risk  2025    Hepatitis C Screening  Completed    RSV Vaccine age 0-20 Months  Aged Out    HIB Vaccine  Aged Out    IPV Vaccine  Aged Out    Hepatitis A Vaccine  Aged Out    Meningococcal ACWY Vaccine  Aged Out    HPV Vaccine  Aged Out     Assessment & Plan   1. Screening for colorectal cancer  2. Encounter for immunization       History of Present Illness   {Disappearing Hyperlinks I Encounters * My Last Note * Since Last Visit * History :76018}  HPI    Review of Systems  {Select to Display PM (Optional):11414}  Objective   {Disappearing Hyperlinks   Review Vitals * Enter New Vitals * Results Review * Labs * Imaging * Cardiology * Procedures * Lung Cancer Screening :88298}  /88   Pulse 84   Temp (!) 97.3 °F (36.3 °C) (Temporal)   Resp 18   Ht 5' 6\" (1.676 m)   Wt 84.8 kg (187 lb)   SpO2 96%   BMI 30.18 kg/m²     Physical Exam  Administrative Statements {Disappearing Hyperlinks I  Level of Service * PCMH/PCSP:84249}  {Time Spent Statement (Optional):52128}        "

## 2024-08-07 NOTE — PROGRESS NOTES
Post-Op LEFT TKA Re-Evaluation    Today's date: 2024  Patient name: Eddie Perez Jr.  : 1950  MRN: [id number]  Referring provider: Edwin Goodrich PA*  Dx:   Encounter Diagnosis     ICD-10-CM    1. Status post total knee replacement, left  Z96.652       2. Primary osteoarthritis of left knee  M17.12             Start Time: 1030  Stop Time: 1130  Total time in clinic (min): 60 minutes    Assessment  Impairments: abnormal coordination, abnormal gait, abnormal muscle firing, abnormal muscle tone, abnormal or restricted ROM, abnormal movement, activity intolerance, impaired balance, impaired physical strength, lacks appropriate home exercise program, pain with function, safety issue, weight-bearing intolerance, poor posture , poor body mechanics, unable to perform ADL, participation limitations, activity limitations and endurance  Symptom irritability: high    Assessment details: Eddie Perez Jr. is a pleasant 73 y.o. male who presents s/p 4 days LEFT TKA underwent on 24 performed at [facility] via Guillermo Jacobo MD. Upon clinical post-op testing, patient demonstrated expected decrease in left knee active and passive motor function and strength. Patient had significant trouble with quad contraction, mostly likely due to recovering from nerve block. Incision was appropriately dressed/covered, no signs of discharge. Patient did fairly well with functional capacity testing evident by his TUG and 5xSTS performance. Patient is ambulating fair with SPC, was given reminder to have SPC on right-side for pain-relief/WB assistance. Patient was provided with a Total Joint Post-Op HEP at last visit. Educated pt to stop any exercises causing significant pain increase,  but it is expected. Pt was educated on expectations and post-op protocols with demonstrated verbal understanding. Pt would benefit from skilled OP physical therapy to address these, and the below impairments in order to optimize outcomes  and promote return to functional baseline.       Patient verbalized understanding of POC.     Please contact me if you have any questions or recommendations. Thank you for the referral and the opportunity to share in [facility]      Barriers to intervention: medical complexity  Understanding of Dx/Px/POC: good     Prognosis: good    Goals  Short Term Goals:  In 4-6 weeks, the patient will:  1. Pt will report at least a 25% reduction in subjective pain complaints/symptoms to better manage ADLs and household chores.   2. Pt will have improve atleast half a grade per LE MMT  3. Pt independent with initial HEP, rationale, technique and frequency, for ROM and pain control.  4. Pt will have gain 50% improvement in knee AROM  5. Pt will be ambulating with least restrictive AD for household distances        Long Term Goals:  In 12 weeks, the patient will:  1. Pt will have atleast 4/5 graded MMT and WNL knee AROM expected post-operatively  2. FOTO to greater than predicted value.  3. Independent with comprehensive HEP upon discharge.  4. Pt will be able to perform ADLs, iADLS, and household duties with minimal restriction indicating return to PLOF.  5. Pt independent with rationale, technique and plan for performance of advanced HEP to ensure independent self-management of symptoms upon discharge.      Plan  Patient would benefit from: skilled physical therapy and PT eval  Referral necessary: No  Planned modality interventions: cryotherapy, biofeedback and TENS    Planned therapy interventions: activity modification, ADL retraining, joint mobilization, manual therapy, massage, balance, balance/weight bearing training, behavior modification, body mechanics training, muscle pump exercises, motor coordination training, nerve gliding, neuromuscular re-education, patient education, postural training, community reintegration, self care, sensory integrative techniques, strengthening, stretching, therapeutic activities,  therapeutic exercise, therapeutic training, home exercise program, graded motor, graded exercise, graded activity, functional ROM exercises, gait training, IASTM, orthotic management and training, patient/caregiver education and transfer training    Frequency: 2x week  Plan of Care beginning date: 2024  Plan of Care expiration date: 11/15/2024  Treatment plan discussed with: patient and family      Subjective Evaluation    History of Present Illness  Mechanism of injury: surgery  Mechanism of injury: Patient presents accompanied with spouse to outpatient physical therapy s/p 4 days LEFT TKA underwent on 24 performed at [facility] via Guillermo Jacobo MD. Patient notes pain continues to be high but is doing well. [place] [facility], [place] it on the [place]. Patient has incision covered appropriately. Patient has most difficulty with bending knee, extending is progressing.           Not a recurrent problem   Quality of life: good    Patient Goals  Patient goals for therapy: decreased pain, improved balance, increased motion, return to sport/leisure activities, independence with ADLs/IADLs, increased strength and decreased edema    Pain  Current pain rating: 3  At best pain ratin  At worst pain rating: 10  Location: L Knee Pain  Quality: dull ache, discomfort, sharp and squeezing  Relieving factors: relaxation, rest and support  Aggravating factors: stair climbing, walking and lifting    Social Support  Steps to enter house: yes  3  Lives in: one-story house  Lives with: spouse    Employment status: not working    Diagnostic Tests  No diagnostic tests performed  Treatments  Previous treatment: medication and injection treatment  Current treatment: physical therapy      Objective      Range of Motion: Goniometric revealed the following findings (in degrees).  Joint Motion Left: 24 Left: 24   Knee Extension -10 P! -25 P!   Knee Flexion WNL P! 100 P!     Strength: MMT revealed the following  "findings.  Joint Motion Left: 7/11/24 Left: 8/9/24   Knee Extension 5/5 P! 3/5   Knee Flexion 5/5 P! 3/5     Additional Assessments:  Gait: Fair with SPC    Balance:   Functional Capacity Tests Scores  Pre-Op 7/11/24 Scores  Post-Op 8/9/24   5xSTS 17.18\" 16.80\"  +1 foam    TUG                 10.25\" No SPC  +1 foam  11.95\"       Risk Assessment and Prediction Tool results reviewed. Patient reported functional outcome scores reviewed. Discussed DOS and patient's questions were answered to patient's satisfaction. Mobility/ROM results per above. Strength results per above. Balance/Gait (including Timed Up & Go) per above. Virtual Home Assessment was reviewed with patient. Home Preparation Checklist was reviewed with patient including identification of care partner and encouragement of single level set-up. Post-operative pain management expectations discussed to the patient's satisfaction. Post-operative gait training for level ground, stairs, and car transfers was performed. Patient demonstrated competence with immediate post-operative home exercise program.            Precautions: GERD, Stage 3a CKD, Memory Difficulty, Prediabetes, HL (hearing loss)     POC expires Unit limit Auth Expiration date PT/OT + Visit Limit?   11/15/24 BOMN 12/31/24 BOMN         Visit/Unit Tracking  AUTH Status:  Date 7/11 7/26 8/9       RE every 10 visits Used 1 2 3        Remaining  9 8 7          Pertinent Findings:      POC End Date: 11/15/24                                                                                          Test / Measure  7/11/24  Pre-Op 8/9/24  Post-Op   FOTO (Predicted ) At Post-Op NP due to wrong body part   Knee AROM -10 to WNL -25 to 100 P!   L Knee MMT 5/5 3/5   5xSTS 17.18\" 16.80\"  +1 foam    TUG  10.25\" No SPC  +1 foam   11.95\"       Access Code: [email or web address]  URL: [email or web address]  Date: 07/11/2024  Prepared by: Radha Huynh    Exercises  - Supine Gluteal Sets  - 1 x daily - 7 x weekly - 2-3 " "sets - 10 reps - 10 seconds hold  - Supine Quad Set  - 3-4 x daily - 7 x weekly - 1-2 sets - 10 reps - 5-10 second hold  - Supine Heel Slide with Strap  - 3-4 x daily - 7 x weekly - 3 sets - 10 reps  - Active Straight Leg Raise with Quad Set  - 1-2 x daily - 7 x weekly - 3 sets - 10 reps  - Supine Bridge  - 1 x daily - 7 x weekly - 3 sets - 10 reps  - Seated Long Arc Quad  - 1 x daily - 7 x weekly - 3 sets - 10 reps  - Seated Ankle Pumps on Table  - 1 x daily - 7 x weekly - 3 sets - 10 reps  - Heel Raises with Counter Support  - 1 x daily - 7 x weekly - 3 sets - 10 reps          Manuals 7/11 7/26  8/9       L Knee PROM      AR       Left Patella Mobilization      AR       L Tibiofemoral Posterior Mob      AR                     Neuro Re-Ed             Quadset      10\"x20       SLR     Max Ax1  2x10        Eccentric Step-Downs             SL Balance             Erwin Negotiation                            Ther Ex             HEP review / edu/ Patient education/expectations AR 38' AR 26'         NuStep or RB  Seat: 9  Arms: 10      8'  Level 4       Heel Slides + Strap Assist      5\"x10               Heel Raises/Toe Raises                                                                                   Ther Activity              FWD Step-Ups              LAT Step-Ups             Gait Training                                         Modalities              CP      8'                                "

## 2024-08-09 ENCOUNTER — OFFICE VISIT (OUTPATIENT)
Dept: PHYSICAL THERAPY | Facility: REHABILITATION | Age: 74
End: 2024-08-09
Payer: MEDICARE

## 2024-08-09 DIAGNOSIS — Z96.652 STATUS POST TOTAL KNEE REPLACEMENT, LEFT: Primary | ICD-10-CM

## 2024-08-09 DIAGNOSIS — M17.12 PRIMARY OSTEOARTHRITIS OF LEFT KNEE: ICD-10-CM

## 2024-08-09 PROCEDURE — 97164 PT RE-EVAL EST PLAN CARE: CPT

## 2024-08-09 PROCEDURE — 97110 THERAPEUTIC EXERCISES: CPT

## 2024-08-09 PROCEDURE — 97112 NEUROMUSCULAR REEDUCATION: CPT

## 2024-08-09 PROCEDURE — 97140 MANUAL THERAPY 1/> REGIONS: CPT

## 2024-08-13 ENCOUNTER — OFFICE VISIT (OUTPATIENT)
Dept: PHYSICAL THERAPY | Facility: REHABILITATION | Age: 74
End: 2024-08-13
Payer: MEDICARE

## 2024-08-13 DIAGNOSIS — M17.12 PRIMARY OSTEOARTHRITIS OF LEFT KNEE: ICD-10-CM

## 2024-08-13 DIAGNOSIS — Z96.652 STATUS POST TOTAL KNEE REPLACEMENT, LEFT: Primary | ICD-10-CM

## 2024-08-13 DIAGNOSIS — Z01.818 PRE-OP TESTING: ICD-10-CM

## 2024-08-13 PROCEDURE — 97112 NEUROMUSCULAR REEDUCATION: CPT

## 2024-08-13 PROCEDURE — 97140 MANUAL THERAPY 1/> REGIONS: CPT

## 2024-08-13 PROCEDURE — 97110 THERAPEUTIC EXERCISES: CPT

## 2024-08-13 NOTE — PROGRESS NOTES
"Daily Note     Today's date: 2024  Patient name: Eddie Perez Jr.  : 1950  MRN: [id number]  Referring provider: Edwin Goodrich PA*  Dx:   Encounter Diagnosis     ICD-10-CM    1. Status post total knee replacement, left  Z96.652       2. Primary osteoarthritis of left knee  M17.12       3. Pre-op testing  Z01.818           Start Time: 1551  Stop Time: 1633  Total time in clinic (min): 42 minutes    Subjective: Pt c/o stiffness \"across and under knee cap.\"      Objective: See treatment diary below      Assessment: Tolerated treatment fair. Pt was able to attempt ROM only on RB this visit. He had no relief of pain throughout nor post tx. He was educated on anatomy, expected sxs, process of healing and goals. L knee extension was at -6 this session. Ice at end of tx helped with discomfort. Patient demonstrated fatigue post treatment, exhibited good technique with therapeutic exercises, and would benefit from continued PT      Plan: Continue per plan of care.  Progress treatment as tolerated.       Precautions: GERD, Stage 3a CKD, Memory Difficulty, Prediabetes, HL (hearing loss)     POC expires Unit limit Auth Expiration date PT/OT + Visit Limit?   11/15/24 BOMN 24 BOMN         Visit/Unit Tracking  AUTH Status:  Date       RE every 10 visits Used 1 2 3 4       Remaining  9 8 7 6         Pertinent Findings:      POC End Date: 11/15/24                                                                                          Test / Measure  24  Pre-Op 24  Post-Op   FOTO (Predicted ) At Post-Op NP due to wrong body part   Knee AROM -10 to WNL -25 to 100 P!   L Knee MMT 5/5 3/5   5xSTS 17.18\" 16.80\"  +1 foam    TUG  10.25\" No SPC  +1 foam   11.95\"       Access Code: [email or web address]  URL: [email or web address]  Date: 2024  Prepared by: Radha Huynh    Exercises  - Supine Gluteal Sets  - 1 x daily - 7 x weekly - 2-3 sets - 10 reps - 10 seconds hold  - Supine " "Quad Set  - 3-4 x daily - 7 x weekly - 1-2 sets - 10 reps - 5-10 second hold  - Supine Heel Slide with Strap  - 3-4 x daily - 7 x weekly - 3 sets - 10 reps  - Active Straight Leg Raise with Quad Set  - 1-2 x daily - 7 x weekly - 3 sets - 10 reps  - Supine Bridge  - 1 x daily - 7 x weekly - 3 sets - 10 reps  - Seated Long Arc Quad  - 1 x daily - 7 x weekly - 3 sets - 10 reps  - Seated Ankle Pumps on Table  - 1 x daily - 7 x weekly - 3 sets - 10 reps  - Heel Raises with Counter Support  - 1 x daily - 7 x weekly - 3 sets - 10 reps          Manuals 7/11 7/26 8/9 8/13     L Knee PROM      AR  SA     Left Patella Mobilization      AR       L Tibiofemoral Posterior Mob      AR                     Neuro Re-Ed             Quadset      10\"x20  10\"x20     SLR     Max Ax1  2x10   Max Ax1  2x10      Eccentric Step-Downs             SL Balance             Erwin Negotiation                            Ther Ex             HEP review / edu/ Patient education/expectations AR 38' AR 26'    SA 15'     NuStep or RB  Seat: 9  Arms: 10      8'  Level 4  RB 8'      Heel Slides + Strap Assist      5\"x10  5\"x10             Heel Raises/Toe Raises                                                                                   Ther Activity              FWD Step-Ups              LAT Step-Ups             Gait Training                                         Modalities              CP      8'  8'                              "

## 2024-08-15 ENCOUNTER — OFFICE VISIT (OUTPATIENT)
Dept: PHYSICAL THERAPY | Facility: REHABILITATION | Age: 74
End: 2024-08-15
Payer: MEDICARE

## 2024-08-15 DIAGNOSIS — Z96.652 STATUS POST TOTAL KNEE REPLACEMENT, LEFT: Primary | ICD-10-CM

## 2024-08-15 PROCEDURE — 97140 MANUAL THERAPY 1/> REGIONS: CPT

## 2024-08-15 PROCEDURE — 97112 NEUROMUSCULAR REEDUCATION: CPT

## 2024-08-15 PROCEDURE — 97110 THERAPEUTIC EXERCISES: CPT

## 2024-08-15 NOTE — PROGRESS NOTES
"Daily Note     Today's date: 8/15/2024  Patient name: Eddie Perez Jr.  : 1950  MRN: [id number]  Referring provider: Edwin Goodrich PA*  Dx:   Encounter Diagnosis     ICD-10-CM    1. Status post total knee replacement, left  Z96.652           Start Time: 1245  Stop Time: 1350  Total time in clinic (min): 65 minutes    Subjective: Patient continues to report tightness along patella. Pain is persistent in posterior aspect of knee.      Objective: See treatment diary below      Assessment: Patient tolerated treatment session well today. Tolerated increase PROM, with TKE being more difficult that flexion. Patient demonstrated difficulties with quad contraction, utilized NMES to facilitate contraction with performing quadsets. Continues to respond well to expectation/healing education. Patient's ROM is ahead for what we typically see s/p replacement. Goal is to progress in more weightbearing exercises at NV, as tolerable. Patient will continue to be appropriate for skilled outpatient physical therapy in order to address impairments and pain s/p L TKA. 1:1 with Radha Huynh, PT, DPT for entirety of treatment session.        Plan: Continue per plan of care.  Progress treatment as tolerated.       Precautions: GERD, Stage 3a CKD, Memory Difficulty, Prediabetes, HL (hearing loss)     POC expires Unit limit Auth Expiration date PT/OT + Visit Limit?   11/15/24 BOMN 24 BOMN         Visit/Unit Tracking  AUTH Status:  Date 7/11 7/26 8/9 8/13 8/15     RE every 10 visits Used 1 2 3 4 5      Remaining  9 8 7 6 5        Pertinent Findings:      POC End Date: 11/15/24                                                                                          Test / Measure  24  Pre-Op 24  Post-Op   FOTO (Predicted ) At Post-Op NP due to wrong body part   Knee AROM -10 to WNL -25 to 100 P!   L Knee MMT 5/5 3/5   5xSTS 17.18\" 16.80\"  +1 foam    TUG  10.25\" No SPC  +1 foam   11.95\"       Access Code: " "[email or web address]  URL: [email or web address]  Date: 07/11/2024  Prepared by: Radha Huynh    Exercises  - Supine Gluteal Sets  - 1 x daily - 7 x weekly - 2-3 sets - 10 reps - 10 seconds hold  - Supine Quad Set  - 3-4 x daily - 7 x weekly - 1-2 sets - 10 reps - 5-10 second hold  - Supine Heel Slide with Strap  - 3-4 x daily - 7 x weekly - 3 sets - 10 reps  - Active Straight Leg Raise with Quad Set  - 1-2 x daily - 7 x weekly - 3 sets - 10 reps  - Supine Bridge  - 1 x daily - 7 x weekly - 3 sets - 10 reps  - Seated Long Arc Quad  - 1 x daily - 7 x weekly - 3 sets - 10 reps  - Seated Ankle Pumps on Table  - 1 x daily - 7 x weekly - 3 sets - 10 reps  - Heel Raises with Counter Support  - 1 x daily - 7 x weekly - 3 sets - 10 reps          Manuals 7/11  7/26  8/9  8/13  8/15   L Knee PROM      AR  SA  AR   Left Patella Mobilization      AR       L Tibiofemoral Posterior Mob      AR    AR                 Neuro Re-Ed             Quadset      10\"x20  10\"x20  + NMES  Level 28  10\"x20   SLR     Max Ax1  2x10   Max Ax1  2x10   +strap  2x10   Eccentric Step-Downs             SL Balance             Erwin Negotiation                            Ther Ex             HEP review / edu/ Patient education/expectations AR 38' AR 26'    SA 15'     NuStep or RB  Seat: 9  Arms: 10      8'  Level 4  RB 8'   10' L4    Heel Slides + Strap Assist      5\"x10  5\"x10  5\"x10   LAQ     2x10   Heel Raises/Toe Raises         2x10   STS         NV                                                           Ther Activity              FWD Step-Ups          NV    LAT Step-Ups          NV   Gait Training                                         Modalities              CP      8'  8'  8'                            "

## 2024-08-20 ENCOUNTER — OFFICE VISIT (OUTPATIENT)
Dept: PHYSICAL THERAPY | Facility: REHABILITATION | Age: 74
End: 2024-08-20
Payer: MEDICARE

## 2024-08-20 DIAGNOSIS — Z96.652 STATUS POST TOTAL KNEE REPLACEMENT, LEFT: Primary | ICD-10-CM

## 2024-08-20 DIAGNOSIS — M17.12 PRIMARY OSTEOARTHRITIS OF LEFT KNEE: ICD-10-CM

## 2024-08-20 DIAGNOSIS — Z01.818 PRE-OP TESTING: ICD-10-CM

## 2024-08-20 PROCEDURE — 97110 THERAPEUTIC EXERCISES: CPT

## 2024-08-20 PROCEDURE — 97112 NEUROMUSCULAR REEDUCATION: CPT

## 2024-08-20 PROCEDURE — 97140 MANUAL THERAPY 1/> REGIONS: CPT

## 2024-08-20 NOTE — PROGRESS NOTES
"Daily Note     Today's date: 2024  Patient name: Eddie Perez Jr.  : 1950  MRN: [id number]  Referring provider: Edwin Goodrich PA*  Dx:   Encounter Diagnosis     ICD-10-CM    1. Status post total knee replacement, left  Z96.652       2. Primary osteoarthritis of left knee  M17.12       3. Pre-op testing  Z01.818           Start Time: 1400  Stop Time: 1440  Total time in clinic (min): 40 minutes    Subjective: Patient reports pain in lateral L knee.       Objective: See treatment diary below      Assessment: Patient tolerated treatment well. Initiated progression of exercises as per POC indicated below. Pt had pain with eccentric lowering step ups. He was more tolerant of PROM this visit. He noted feeling less pain post ice. Patient will continue to be appropriate for skilled outpatient physical therapy in order to address impairments and pain s/p L TKA.        Plan: Continue per plan of care.  Progress treatment as tolerated.       Precautions: GERD, Stage 3a CKD, Memory Difficulty, Prediabetes, HL (hearing loss)     POC expires Unit limit Auth Expiration date PT/OT + Visit Limit?   11/15/24 BOMN 24 BOMN         Visit/Unit Tracking  AUTH Status:  Date 7/11 7/26 8/9 8/13 8/15 8/20    RE every 10 visits Used 1 2 3 4 5 6     Remaining  9 8 7 6 5 4       Pertinent Findings:      POC End Date: 11/15/24                                                                                          Test / Measure  24  Pre-Op 24  Post-Op   FOTO (Predicted ) At Post-Op NP due to wrong body part   Knee AROM -10 to WNL -25 to 100 P!   L Knee MMT 5/5 3/5   5xSTS 17.18\" 16.80\"  +1 foam    TUG  10.25\" No SPC  +1 foam   11.95\"       Access Code: [email or web address]  URL: [email or web address]  Date: 2024  Prepared by: Radha Huynh    Exercises  - Supine Gluteal Sets  - 1 x daily - 7 x weekly - 2-3 sets - 10 reps - 10 seconds hold  - Supine Quad Set  - 3-4 x daily - 7 x weekly - 1-2 sets - " "10 reps - 5-10 second hold  - Supine Heel Slide with Strap  - 3-4 x daily - 7 x weekly - 3 sets - 10 reps  - Active Straight Leg Raise with Quad Set  - 1-2 x daily - 7 x weekly - 3 sets - 10 reps  - Supine Bridge  - 1 x daily - 7 x weekly - 3 sets - 10 reps  - Seated Long Arc Quad  - 1 x daily - 7 x weekly - 3 sets - 10 reps  - Seated Ankle Pumps on Table  - 1 x daily - 7 x weekly - 3 sets - 10 reps  - Heel Raises with Counter Support  - 1 x daily - 7 x weekly - 3 sets - 10 reps          Manuals 7/11  7/26  8/9  8/13  8/15 8/20   L Knee PROM      AR  SA  AR SA   Left Patella Mobilization      AR        L Tibiofemoral Posterior Mob      AR    AR                   Neuro Re-Ed              Quadset      10\"x20  10\"x20  + NMES  Level 28  10\"x20 10\"x20   SLR     Max Ax1  2x10   Max Ax1  2x10   +strap  2x10 +strap  2x10   Eccentric Step-Downs              SL Balance              Erwin Negotiation                              Ther Ex              HEP review / edu/ Patient education/expectations AR 38' AR 26'    SA 15'      NuStep or RB  Seat: 9  Arms: 10      8'  Level 4  RB 8'   10' L4   RB 10'   Heel Slides + Strap Assist      5\"x10  5\"x10  5\"x10 5\"x10   LAQ     2x10 2x10   Heel Raises/Toe Raises         2x10 2x10   STS         NV 2X10                                                               Ther Activity               FWD Step-Ups          NV 4\" 1x10    LAT Step-Ups          NV 4\" 1x10   Gait Training                                            Modalities               CP      8'  8'  8' 8'                             "

## 2024-08-22 ENCOUNTER — OFFICE VISIT (OUTPATIENT)
Dept: PHYSICAL THERAPY | Facility: REHABILITATION | Age: 74
End: 2024-08-22
Payer: MEDICARE

## 2024-08-22 DIAGNOSIS — M17.12 PRIMARY OSTEOARTHRITIS OF LEFT KNEE: ICD-10-CM

## 2024-08-22 DIAGNOSIS — Z96.652 STATUS POST TOTAL KNEE REPLACEMENT, LEFT: Primary | ICD-10-CM

## 2024-08-22 PROCEDURE — 97140 MANUAL THERAPY 1/> REGIONS: CPT

## 2024-08-22 PROCEDURE — 97110 THERAPEUTIC EXERCISES: CPT

## 2024-08-22 NOTE — PROGRESS NOTES
Daily Note     Today's date: 2024  Patient name: Eddie Perez Jr.  : 1950  MRN: [id number]  Referring provider: Edwin Goodrich PA*  Dx:   Encounter Diagnosis     ICD-10-CM    1. Status post total knee replacement, left  Z96.652       2. Primary osteoarthritis of left knee  M17.12           Start Time: 1315  Stop Time: 1410  Total time in clinic (min): 55 minutes    Subjective: Patient is progressing well. Continues to have quite a bit of pain. This week Ortho increased his resistance on stationary bike resulting in increase knee and quad soreness.      Objective: See treatment diary below      Assessment: Patient tolerated treatment well. Despite higher levels of pain, patient was able to perform all exercise and their progressions today. Patient demonstrated difficulty with eccentric quad control with step-downs, as anticipated. We continue to have conversations regarding pain management and healing timelines expectations. He continues to demonstrate impressive mobility gains over the last few visits. Patient will continue to be appropriate for skilled outpatient physical therapy in order to address impairments and pain s/p L TKA. 1:1 with Radha Huynh PT, DPT for entirety of tx.         Plan: Continue per plan of care.  Progress treatment as tolerated.       Precautions: GERD, Stage 3a CKD, Memory Difficulty, Prediabetes, HL (hearing loss)     POC expires Unit limit Auth Expiration date PT/OT + Visit Limit?   11/15/24 BOMN 24 BOMN         Visit/Unit Tracking  AUTH Status:  Date 7/11 7/26 8/9 8/13 8/15 8/20 8/22   RE every 10 visits Used 1 2 3 4 5 6 7    Remaining  9 8 7 6 5 4 3      Pertinent Findings:      POC End Date: 11/15/24                                                                                          Test / Measure  24  Pre-Op 24  Post-Op   FOTO (Predicted ) At Post-Op NP due to wrong body part   Knee AROM -10 to WNL -25 to 100 P!   L Knee MMT 5/5 3/5  "  5xSTS 17.18\" 16.80\"  +1 foam    TUG  10.25\" No SPC  +1 foam   11.95\"       Access Code: [email or web address]  URL: [email or web address]  Date: 07/11/2024  Prepared by: Radha Huynh    Exercises  - Supine Gluteal Sets  - 1 x daily - 7 x weekly - 2-3 sets - 10 reps - 10 seconds hold  - Supine Quad Set  - 3-4 x daily - 7 x weekly - 1-2 sets - 10 reps - 5-10 second hold  - Supine Heel Slide with Strap  - 3-4 x daily - 7 x weekly - 3 sets - 10 reps  - Active Straight Leg Raise with Quad Set  - 1-2 x daily - 7 x weekly - 3 sets - 10 reps  - Supine Bridge  - 1 x daily - 7 x weekly - 3 sets - 10 reps  - Seated Long Arc Quad  - 1 x daily - 7 x weekly - 3 sets - 10 reps  - Seated Ankle Pumps on Table  - 1 x daily - 7 x weekly - 3 sets - 10 reps  - Heel Raises with Counter Support  - 1 x daily - 7 x weekly - 3 sets - 10 reps          Manuals 7/11  7/26  8/9  8/13  8/15 8/20 8/22   L Knee PROM      AR  SA  AR SA AR   L Tibiofemoral Posterior Mob      AR    AR  AR                   Neuro Re-Ed               Quadset      10\"x20  10\"x20  + NMES  Level 28  10\"x20 10\"x20 10\"x20   SLR     Max Ax1  2x10   Max Ax1  2x10   +strap  2x10 No strap   2x10 No strap  2x10   Eccentric FWD Step-Downs            4\" step  10x   SL Balance               Erwin Negotiation FWD/LAT            4 hurdles  4 laps                   Ther Ex               HEP review / edu/ Patient education/expectations AR 38' AR 26'    SA 15'       NuStep or RB  Seat: 9  Arms: 10      8'  Level 4  RB 8'   10' L4   RB 10' RB 10'   Heel Slides + Strap Assist      5\"x10  5\"x10  5\"x10 5\"x10 5\"x10   LAQ     2x10 2x10 35x  # @ NV   HS Curl       NV   Heel Raises/Toe Raises         2x10 2x10 6\" step  3x10   STS         NV 2X10 Green therpad on  RLE  2x8    Leg Press            NV                                                   Ther Activity                FWD Step-Ups          NV 4\" 1x10 6\"   15x L    LAT Step-Ups          NV 4\" 1x10 9\"  15x L   Gait Training             "                                   Modalities                CP      8'  8'  8' 8' 8' #5

## 2024-08-27 ENCOUNTER — OFFICE VISIT (OUTPATIENT)
Dept: PHYSICAL THERAPY | Facility: REHABILITATION | Age: 74
End: 2024-08-27
Payer: MEDICARE

## 2024-08-27 DIAGNOSIS — Z96.652 STATUS POST TOTAL KNEE REPLACEMENT, LEFT: Primary | ICD-10-CM

## 2024-08-27 DIAGNOSIS — M17.12 PRIMARY OSTEOARTHRITIS OF LEFT KNEE: ICD-10-CM

## 2024-08-27 PROCEDURE — 97110 THERAPEUTIC EXERCISES: CPT

## 2024-08-27 PROCEDURE — 97140 MANUAL THERAPY 1/> REGIONS: CPT

## 2024-08-27 NOTE — PROGRESS NOTES
"Daily Note     Today's date: 2024  Patient name: Eddie Perez Jr.  : 1950  MRN: [id number]  Referring provider: Edwin Goodrich PA*  Dx:   Encounter Diagnosis     ICD-10-CM    1. Status post total knee replacement, left  Z96.652       2. Primary osteoarthritis of left knee  M17.12           Start Time: 1233  Stop Time: 1324  Total time in clinic (min): 51 minutes    Subjective: Patient is doing well today. Adjusting to the recent bike progressions with resistance. Patient's first follow-up went well with [facility]. He is able to return to driving.      Objective: See treatment diary below      Assessment: Patient tolerated treatment well. Patient continues to tolerate exercise progressions well. Limited due to knee pain and LE fatigue. Patient will continue to be appropriate for skilled outpatient physical therapy in order to address impairments and pain s/p L TKA.         Plan: Continue per plan of care.  Progress treatment as tolerated.       Precautions: GERD, Stage 3a CKD, Memory Difficulty, Prediabetes, HL (hearing loss)     POC expires Unit limit Auth Expiration date PT/OT + Visit Limit?   11/15/24 BOMN 24 BOMN         Visit/Unit Tracking  AUTH Status:  Date 7/11 7/26 8/9 8/13 8/15 8/20 8/22 8/27   RE every 10 visits Used 1 2 3 4 5 6 7 8    Remaining  9 8 7 6 5 4 3 2      Pertinent Findings:      POC End Date: 11/15/24                                                                                          Test / Measure  24  Pre-Op 24  Post-Op   FOTO (Predicted ) At Post-Op NP due to wrong body part   Knee AROM -10 to WNL -25 to 100 P!   L Knee MMT 5/5 3/5   5xSTS 17.18\" 16.80\"  +1 foam    TUG  10.25\" No SPC  +1 foam   11.95\"       Access Code: [email or web address]  URL: [email or web address]  Date: 2024  Prepared by: Radha Huynh    Exercises  - Supine Gluteal Sets  - 1 x daily - 7 x weekly - 2-3 sets - 10 reps - 10 seconds hold  - Supine Quad Set  - 3-4 x daily - 7 x " "weekly - 1-2 sets - 10 reps - 5-10 second hold  - Supine Heel Slide with Strap  - 3-4 x daily - 7 x weekly - 3 sets - 10 reps  - Active Straight Leg Raise with Quad Set  - 1-2 x daily - 7 x weekly - 3 sets - 10 reps  - Supine Bridge  - 1 x daily - 7 x weekly - 3 sets - 10 reps  - Seated Long Arc Quad  - 1 x daily - 7 x weekly - 3 sets - 10 reps  - Seated Ankle Pumps on Table  - 1 x daily - 7 x weekly - 3 sets - 10 reps  - Heel Raises with Counter Support  - 1 x daily - 7 x weekly - 3 sets - 10 reps          Manuals 7/11  7/26  8/9  8/13  8/15 8/20 8/22 8/27   L Knee PROM      AR  SA  AR SA AR AR   L Tibiofemoral Posterior Mob      AR    AR  AR AR                    Neuro Re-Ed                Quadset      10\"x20  10\"x20  + NMES  Level 28  10\"x20 10\"x20 10\"x20 Bolster under ankle  5\"x20   SLR     Max Ax1  2x10   Max Ax1  2x10   +strap  2x10 No strap   2x10 No strap  2x10 #2  3x10   Eccentric FWD Step-Downs            4\" step  10x    SL Balance                Erwin Negotiation FWD/LAT            4 hurdles  4 laps                     Ther Ex                HEP review / edu/ Patient education/expectations AR 38' AR 26'    SA 15'        NuStep or RB  Seat: 9  Arms: 10      8'  Level 4  RB 8'   10' L4   RB 10' RB 10' RB 10'   Heel Slides + Strap Assist      5\"x10  5\"x10  5\"x10 5\"x10 5\"x10 5\"x10   LAQ     2x10 2x10 35x  # @ NV #3  3x10   HS Curl       NV OTB  2x10    Heel Raises/Toe Raises         2x10 2x10 6\" step  3x10    STS         NV 2X10 Green therpad on  RLE  2x8 NP    Leg Press            NV #40  3x10                                                      Ther Activity                 FWD Step-Ups          NV 4\" 1x10 6\"   15x L     LAT Step-Ups          NV 4\" 1x10 9\"  15x L    Gait Training                                                  Modalities                 CP      8'  8'  8' 8' 8' #5 NP                               "

## 2024-08-29 ENCOUNTER — OFFICE VISIT (OUTPATIENT)
Dept: PHYSICAL THERAPY | Facility: REHABILITATION | Age: 74
End: 2024-08-29
Payer: MEDICARE

## 2024-08-29 DIAGNOSIS — M17.12 PRIMARY OSTEOARTHRITIS OF LEFT KNEE: ICD-10-CM

## 2024-08-29 DIAGNOSIS — Z96.652 STATUS POST TOTAL KNEE REPLACEMENT, LEFT: Primary | ICD-10-CM

## 2024-08-29 PROCEDURE — 97112 NEUROMUSCULAR REEDUCATION: CPT

## 2024-08-29 PROCEDURE — 97530 THERAPEUTIC ACTIVITIES: CPT

## 2024-08-29 PROCEDURE — 97110 THERAPEUTIC EXERCISES: CPT

## 2024-08-29 PROCEDURE — 97140 MANUAL THERAPY 1/> REGIONS: CPT

## 2024-08-29 NOTE — PROGRESS NOTES
"Daily Note     Today's date: 2024  Patient name: Eddie Perez Jr.  : 1950  MRN: [id number]  Referring provider: Edwin Goodrich PA*  Dx:   Encounter Diagnosis     ICD-10-CM    1. Status post total knee replacement, left  Z96.652       2. Primary osteoarthritis of left knee  M17.12           Start Time: 1205  Stop Time: 1258  Total time in clinic (min): 53 minutes    Subjective: Patient is doing well today. Patient is attempting to wean from narcotics to be able to drive. Today was first day without stronger medication, mainly using Tylenol.       Objective: See treatment diary below      Assessment: Patient tolerated treatment well. Patient continues to find challenge in neuromuscular control of quad with SLR and quad sets, fatigued fast. Deferred weight/load for SLR due to increase tissue irritability. Patient requires cueing to focus on slowing movements down to reduce momentum for step-ups and leg press. Patient will continue to be appropriate for skilled outpatient physical therapy in order to address impairments and pain s/p L TKA.         Plan: Continue per plan of care.  Progress treatment as tolerated.       Precautions: GERD, Stage 3a CKD, Memory Difficulty, Prediabetes, HL (hearing loss), s/p L TKA 24    POC expires Unit limit Auth Expiration date PT/OT + Visit Limit?   11/15/24 BOMN 24 BOMN         Visit/Unit Tracking  AUTH Status:  Date 7/11 7/26 8/9 8/13 8/15 8/20 8/22 8/27 8/29   RE every 10 visits Used 1 2 3 4 5 6 7 8 9    Remaining  9 8 7 6 5 4 3 2 1      Pertinent Findings:      POC End Date: 11/15/24                                                                                          Test / Measure  24  Pre-Op 24  Post-Op   FOTO (Predicted ) At Post-Op NP due to wrong body part   Knee AROM -10 to WNL -25 to 100 P!   L Knee MMT 5/5 3/5   5xSTS 17.18\" 16.80\"  +1 foam    TUG  10.25\" No SPC  +1 foam   11.95\"       Access Code: [email or web address]  URL: " [email or web address]  Date: 07/11/2024  Prepared by: Radha Huynh    Exercises  - Supine Gluteal Sets  - 1 x daily - 7 x weekly - 2-3 sets - 10 reps - 10 seconds hold  - Supine Quad Set  - 3-4 x daily - 7 x weekly - 1-2 sets - 10 reps - 5-10 second hold  - Supine Heel Slide with Strap  - 3-4 x daily - 7 x weekly - 3 sets - 10 reps  - Active Straight Leg Raise with Quad Set  - 1-2 x daily - 7 x weekly - 3 sets - 10 reps  - Supine Bridge  - 1 x daily - 7 x weekly - 3 sets - 10 reps  - Seated Long Arc Quad  - 1 x daily - 7 x weekly - 3 sets - 10 reps  - Seated Ankle Pumps on Table  - 1 x daily - 7 x weekly - 3 sets - 10 reps  - Heel Raises with Counter Support  - 1 x daily - 7 x weekly - 3 sets - 10 reps          Manuals  8/13  8/15 8/20 8/22 8/27 8/29   L Knee PROM  SA  AR SA AR AR AR   L Tibiofemoral Posterior Mob    AR  AR AR AR               Neuro Re-Ed           Quadset  10\"x20  + NMES  Level 28  10\"x20 10\"x20 10\"x20 Bolster under ankle  5\"x20 Bolster under ankle  5\"x20   SLR  Max Ax1  2x10   +strap  2x10 No strap   2x10 No strap  2x10 #2  3x10 10x   Eccentric FWD Step-Downs      4\" step  10x     SL Balance        HEP   Erwin Negotiation FWD/LAT      4 hurdles  4 laps                 Ther Ex           HEP review / edu/ Patient education/expectations  SA 15'         NuStep or RB  Seat: 9  Arms: 10   RB 8'   10' L4   RB 10' RB 10' RB 10' RB 8'   Heel Slides + Strap Assist  5\"x10  5\"x10 5\"x10 5\"x10 5\"x10 NP   LAQ  2x10 2x10 35x  # @ NV #3  3x10 #3  3x10   HS Curl    NV OTB  2x10  OTB  2x10   Heel Raises/Toe Raises   2x10 2x10 6\" step  3x10     STS   NV 2X10 Green therpad on  RLE  2x8 NP 3x8    Leg Press      NV #40  3x10 #40  3x10   Standing Hip Abduction        20x B/L                           Ther Activity            FWD Step-Ups    NV 4\" 1x10 6\"   15x L  6\" step  20x L        LAT Step-Ups    NV 4\" 1x10 9\"  15x L  9\" step  20x L     Gait Training                                   Modalities         "    CP  8'  8' 8' 8' #5 NP

## 2024-09-03 ENCOUNTER — OFFICE VISIT (OUTPATIENT)
Dept: PHYSICAL THERAPY | Facility: REHABILITATION | Age: 74
End: 2024-09-03
Payer: MEDICARE

## 2024-09-03 DIAGNOSIS — Z96.652 STATUS POST TOTAL KNEE REPLACEMENT, LEFT: Primary | ICD-10-CM

## 2024-09-03 DIAGNOSIS — M17.12 PRIMARY OSTEOARTHRITIS OF LEFT KNEE: ICD-10-CM

## 2024-09-03 DIAGNOSIS — Z01.818 PRE-OP TESTING: ICD-10-CM

## 2024-09-03 PROCEDURE — 97112 NEUROMUSCULAR REEDUCATION: CPT

## 2024-09-03 PROCEDURE — 97140 MANUAL THERAPY 1/> REGIONS: CPT

## 2024-09-03 PROCEDURE — 97110 THERAPEUTIC EXERCISES: CPT

## 2024-09-03 NOTE — PROGRESS NOTES
"Daily Note     Today's date: 9/3/2024  Patient name: Eddie Perez Jr.  : 1950  MRN: [id number]  Referring provider: Edwin Goodrich PA*  Dx:   Encounter Diagnosis     ICD-10-CM    1. Status post total knee replacement, left  Z96.652       2. Primary osteoarthritis of left knee  M17.12       3. Pre-op testing  Z01.818                      Subjective:  Patient states that he is doing alright. He continues to have pain       Objective: See treatment diary below      Assessment: Tolerated treatment well. Patient notes he continues to be in pain in the morning. Discussed it being normal for him to be stiff in the morning after not moving for 8+ hours, encourage use of some gentle stretching before he gets up. Patient demonstrated fatigue post treatment, exhibited good technique with therapeutic exercises, and would benefit from continued PT      Plan: Continue per plan of care.      Precautions: GERD, Stage 3a CKD, Memory Difficulty, Prediabetes, HL (hearing loss), s/p L TKA 24    POC expires Unit limit Auth Expiration date PT/OT + Visit Limit?   11/15/24 BOMN 24 BOMN         Visit/Unit Tracking  AUTH Status:  Date 9/3 7/26 8/9 8/13 8/15 8/20 8/22 8/27 8/29   RE every 10 visits Used 10   2 3 4 5 6 7 8 9    Remaining  0 8 7 6 5 4 3 2 1      Pertinent Findings:      POC End Date: 11/15/24                                                                                          Test / Measure  24  Pre-Op 24  Post-Op   FOTO (Predicted ) At Post-Op NP due to wrong body part   Knee AROM -10 to WNL -25 to 100 P!   L Knee MMT 5/5 3/5   5xSTS 17.18\" 16.80\"  +1 foam    TUG  10.25\" No SPC  +1 foam   11.95\"       Access Code: [email or web address]  URL: [email or web address]  Date: 2024  Prepared by: Radha Huynh    Exercises  - Supine Gluteal Sets  - 1 x daily - 7 x weekly - 2-3 sets - 10 reps - 10 seconds hold  - Supine Quad Set  - 3-4 x daily - 7 x weekly - 1-2 sets - 10 reps - 5-10 " "second hold  - Supine Heel Slide with Strap  - 3-4 x daily - 7 x weekly - 3 sets - 10 reps  - Active Straight Leg Raise with Quad Set  - 1-2 x daily - 7 x weekly - 3 sets - 10 reps  - Supine Bridge  - 1 x daily - 7 x weekly - 3 sets - 10 reps  - Seated Long Arc Quad  - 1 x daily - 7 x weekly - 3 sets - 10 reps  - Seated Ankle Pumps on Table  - 1 x daily - 7 x weekly - 3 sets - 10 reps  - Heel Raises with Counter Support  - 1 x daily - 7 x weekly - 3 sets - 10 reps          Manuals 9/3  8/15 8/20 8/22 8/27 8/29   L Knee PROM RA  AR SA AR AR AR   L Tibiofemoral Posterior Mob   AR  AR AR AR              Neuro Re-Ed          Quadset Bolster under ankle  5\"x20  + NMES  Level 28  10\"x20 10\"x20 10\"x20 Bolster under ankle  5\"x20 Bolster under ankle  5\"x20   SLR 2x10   +strap  2x10 No strap   2x10 No strap  2x10 #2  3x10 10x   Eccentric FWD Step-Downs     4\" step  10x     SL Balance       HEP   Erwin Negotiation FWD/LAT     4 hurdles  4 laps                Ther Ex          HEP review / edu/ Patient education/expectations          NuStep or RB  Seat: 9  Arms: 10   RB 8'   10' L4   RB 10' RB 10' RB 10' RB 8'   Heel Slides + Strap Assist    5\"x10 5\"x10 5\"x10 5\"x10 NP   LAQ #3  3x10 2x10 2x10 35x  # @ NV #3  3x10 #3  3x10   HS Curl    NV OTB  2x10  OTB  2x10   Heel Raises/Toe Raises   2x10 2x10 6\" step  3x10     STS  3x8  NV 2X10 Green therpad on  RLE  2x8 NP 3x8    Leg Press  40# 3x10    NV #40  3x10 #40  3x10   Standing Hip Abduction  20x b/l       20x B/L                           Ther Activity            FWD Step-Ups  6\" step 20x L  NV 4\" 1x10 6\"   15x L  6\" step  20x L        LAT Step-Ups    NV 4\" 1x10 9\"  15x L  9\" step  20x L     Gait Training                                   Modalities            CP  8'  8' 8' 8' #5 NP                             "

## 2024-09-04 NOTE — PROGRESS NOTES
"Daily Note     Today's date: 2024  Patient name: Eddie Perez Jr.  : 1950  MRN: [id number]  Referring provider: Edwin Goodrich PA*  Dx:   No diagnosis found.                 Subjective:  Patient states that he is doing alright. He continues to have pain       Objective: See treatment diary below      Assessment: Tolerated treatment well. Patient notes he continues to be in pain in the morning. Discussed it being normal for him to be stiff in the morning after not moving for 8+ hours, encourage use of some gentle stretching before he gets up. Patient demonstrated fatigue post treatment, exhibited good technique with therapeutic exercises, and would benefit from continued PT      Plan: Continue per plan of care.      Precautions: GERD, Stage 3a CKD, Memory Difficulty, Prediabetes, HL (hearing loss), s/p L TKA 24    POC expires Unit limit Auth Expiration date PT/OT + Visit Limit?   11/15/24 BOMN 24 BOMN         Visit/Unit Tracking  AUTH Status:  Date 9/3 7/26 8/9 8/13 8/15 8/20 8/22 8/27 8/29 9/5   RE every 10 visits Used 10   2 3 4 5 6 7 8 9 10    Remaining  0 8 7 6 5 4 3 2 1 RE NV      Pertinent Findings:      POC End Date: 11/15/24                                                                                          Test / Measure  24  Pre-Op 24  Post-Op   FOTO (Predicted ) At Post-Op NP due to wrong body part   Knee AROM -10 to WNL -25 to 100 P!   L Knee MMT 5/5 3/5   5xSTS 17.18\" 16.80\"  +1 foam    TUG  10.25\" No SPC  +1 foam   11.95\"       Access Code: [email or web address]  URL: [email or web address]  Date: 2024  Prepared by: Radha Huynh    Exercises  - Supine Gluteal Sets  - 1 x daily - 7 x weekly - 2-3 sets - 10 reps - 10 seconds hold  - Supine Quad Set  - 3-4 x daily - 7 x weekly - 1-2 sets - 10 reps - 5-10 second hold  - Supine Heel Slide with Strap  - 3-4 x daily - 7 x weekly - 3 sets - 10 reps  - Active Straight Leg Raise with Quad Set  - 1-2 x daily - " "7 x weekly - 3 sets - 10 reps  - Supine Bridge  - 1 x daily - 7 x weekly - 3 sets - 10 reps  - Seated Long Arc Quad  - 1 x daily - 7 x weekly - 3 sets - 10 reps  - Seated Ankle Pumps on Table  - 1 x daily - 7 x weekly - 3 sets - 10 reps  - Heel Raises with Counter Support  - 1 x daily - 7 x weekly - 3 sets - 10 reps          Manuals 9/3  8/15 8/20 8/22 8/27 8/29 9/5   L Knee PROM RA  AR SA AR AR AR    L Tibiofemoral Posterior Mob   AR  AR AR AR                Neuro Re-Ed           Quadset Bolster under ankle  5\"x20  + NMES  Level 28  10\"x20 10\"x20 10\"x20 Bolster under ankle  5\"x20 Bolster under ankle  5\"x20    SLR 2x10   +strap  2x10 No strap   2x10 No strap  2x10 #2  3x10 10x    Eccentric FWD Step-Downs     4\" step  10x      SL Balance       HEP    Erwin Negotiation FWD/LAT     4 hurdles  4 laps                  Ther Ex           HEP review / edu/ Patient education/expectations           NuStep or RB  Seat: 9  Arms: 10   RB 8'   10' L4   RB 10' RB 10' RB 10' RB 8'    Heel Slides + Strap Assist    5\"x10 5\"x10 5\"x10 5\"x10 NP    LAQ #3  3x10 2x10 2x10 35x  # @ NV #3  3x10 #3  3x10    HS Curl    NV OTB  2x10  OTB  2x10    Heel Raises/Toe Raises   2x10 2x10 6\" step  3x10      STS  3x8  NV 2X10 Green therpad on  RLE  2x8 NP 3x8     Leg Press  40# 3x10    NV #40  3x10 #40  3x10    Standing Hip Abduction  20x b/l       20x B/L                              Ther Activity             FWD Step-Ups  6\" step 20x L  NV 4\" 1x10 6\"   15x L  6\" step  20x L         LAT Step-Ups    NV 4\" 1x10 9\"  15x L  9\" step  20x L      Gait Training                                      Modalities             CP  8'  8' 8' 8' #5 NP                               "

## 2024-09-05 ENCOUNTER — EVALUATION (OUTPATIENT)
Dept: PHYSICAL THERAPY | Facility: REHABILITATION | Age: 74
End: 2024-09-05
Payer: MEDICARE

## 2024-09-05 DIAGNOSIS — M17.12 PRIMARY OSTEOARTHRITIS OF LEFT KNEE: ICD-10-CM

## 2024-09-05 DIAGNOSIS — Z96.652 STATUS POST TOTAL KNEE REPLACEMENT, LEFT: Primary | ICD-10-CM

## 2024-09-05 PROCEDURE — 97164 PT RE-EVAL EST PLAN CARE: CPT

## 2024-09-05 PROCEDURE — 97110 THERAPEUTIC EXERCISES: CPT

## 2024-09-05 PROCEDURE — 97112 NEUROMUSCULAR REEDUCATION: CPT

## 2024-09-05 NOTE — PROGRESS NOTES
Post-Op LEFT TKA Re-Evaluation    Today's date: 2024  Patient name: Eddie Perez Jr.  : 1950  MRN: [id number]  Referring provider: Guillermo Jacobo MD  Dx:   Encounter Diagnosis     ICD-10-CM    1. Status post total knee replacement, left  Z96.652       2. Primary osteoarthritis of left knee  M17.12           Start Time: 1220  Stop Time: 1300  Total time in clinic (min): 40 minutes    Assessment  Impairments: abnormal gait, abnormal muscle firing, abnormal muscle tone, abnormal or restricted ROM, abnormal movement, activity intolerance, impaired balance, impaired physical strength, lacks appropriate home exercise program, pain with function, weight-bearing intolerance, poor posture , poor body mechanics, participation limitations, activity limitations and endurance  Symptom irritability: moderate    Assessment details: Eddie Perez Jr. is a 73 y.o. male who presents s/p 1 month LEFT TKA underwent on 24 performed at [facility] via Guillermo Jacobo MD. Upon clinical post-op testing, patient demonstrated improvements in his functional capacity and mobility per TUG, MMT, and passive range of motion testing. Although 5xSTS performance was slower than newly post-op, I anticipate this is due to the removal of chair height modification, no UE support and pain. He continues to lack TKE, however, when compared to baseline pre-operatively, he is progressing. I discussed and provided additional ways to perform TKE at home, he MUST continue to be diligent and consistent on performing HEP to facilitate extension. Patient continues to be functional limited due to high levels of pain which is still anticipated at this stage. Patient can benefit from encouragement and consistent education on functional expectations. Pt would benefit from skilled OP physical therapy to address these, and the below impairments in order to optimize outcomes and promote return to functional baseline.       Patient verbalized  understanding of POC.     Please contact me if you have any questions or recommendations. Thank you for the referral and the opportunity to share in [facility] care      Barriers to intervention: medical complexity  Understanding of Dx/Px/POC: good     Prognosis: good    Goals  Short Term Goals:  In 4-6 weeks, the patient will:  1. Pt will report at least a 25% reduction in subjective pain complaints/symptoms to better manage ADLs and household chores. MET  2. Pt will have improve atleast half a grade per LE MMT MET  3. Pt independent with initial HEP, rationale, technique and frequency, for ROM and pain control. MET  4. Pt will have gain 50% improvement in knee AROM MET  5. Pt will be ambulating with least restrictive AD for household distances MET        Long Term Goals:  In 12 weeks, the patient will:  1. Pt will have atleast 4/5 graded MMT and WNL knee AROM expected post-operatively PROGRESSING  2. FOTO to greater than predicted value. MET  3. Independent with comprehensive HEP upon discharge. PROGRESSING  4. Pt will be able to perform ADLs, iADLS, and household duties with minimal restriction indicating return to PLOF. PROGRESSING  5. Pt independent with rationale, technique and plan for performance of advanced HEP to ensure independent self-management of symptoms upon discharge. PROGRESSING      Plan  Patient would benefit from: skilled physical therapy and PT eval  Referral necessary: No  Planned modality interventions: cryotherapy, biofeedback and TENS    Planned therapy interventions: activity modification, ADL retraining, joint mobilization, manual therapy, massage, balance, balance/weight bearing training, behavior modification, body mechanics training, muscle pump exercises, motor coordination training, nerve gliding, neuromuscular re-education, patient education, postural training, community reintegration, self care, sensory integrative techniques, strengthening, stretching, therapeutic activities,  therapeutic exercise, therapeutic training, home exercise program, graded motor, graded exercise, graded activity, functional ROM exercises, gait training, IASTM, orthotic management and training, patient/caregiver education and transfer training    Frequency: 2x week  Plan of Care beginning date: 2024  Plan of Care expiration date: 11/15/2024  Treatment plan discussed with: patient        Subjective Evaluation    History of Present Illness  Date of surgery: 2024  Mechanism of injury: surgery  Mechanism of injury: Patient presents s/p 1 month LEFT TKA underwent on 24 performed at [facility] via Guillermo Jacobo MD. Patient notes improvement since participating in skilled PT. He continues to report high levels of pain that requires him to continue to utilize opioids as needed. Patient continues to report gnawing deep pain even after performing exericses/WB tasks. Sleeping hasn't been well due to the pain. He expresses his frustration of how slow the healing timeline has been.            Not a recurrent problem   Quality of life: good    Patient Goals  Patient goals for therapy: decreased pain, improved balance, increased motion, return to sport/leisure activities, independence with ADLs/IADLs, increased strength and decreased edema    Pain  Current pain rating: 3  At best pain ratin  At worst pain ratin  Location: L Knee Pain  Quality: dull ache, discomfort, sharp and squeezing  Relieving factors: relaxation, rest and support  Aggravating factors: stair climbing, walking and lifting    Social Support  Steps to enter house: yes  3  Lives in: one-story house  Lives with: spouse    Employment status: not working    Diagnostic Tests  No diagnostic tests performed  Treatments  Previous treatment: medication and injection treatment  Current treatment: physical therapy        Objective      Range of Motion: Goniometric revealed the following findings (in degrees).  Joint Motion Left: 24 Left: 24 Left:  "9/5/24   Knee Extension -10 P! -25 P! -15 P!   Knee Flexion WNL P! 100 P! WNL     Strength: MMT revealed the following findings.  Joint Motion Left: 7/11/24 Left: 8/9/24 Left: 9/5/24   Knee Extension 5/5 P! 3/5 4/5   Knee Flexion 5/5 P! 3/5 5/5     Additional Assessments:  Gait:     Balance:   Functional Capacity Tests Scores  Pre-Op 7/11/24 Scores  Post-Op 8/9/24 Scores:  9/5/24   5xSTS 17.18\" 16.80\"  +1 foam  18.63\"   TUG                 10.25\" No SPC  +1 foam  11.95\" 8.40\"             Precautions: GERD, Stage 3a CKD, Memory Difficulty, Prediabetes, HL (hearing loss)     POC expires Unit limit Auth Expiration date PT/OT + Visit Limit?   11/15/24 BOMN 12/31/24 BOMN              Pertinent Findings:      POC End Date: 11/15/24                                                                                          Test / Measure  7/11/24  Pre-Op 8/9/24  Post-Op 9/5/24   FOTO (Predicted ) At Post-Op NP due to wrong body part    Knee AROM -10 to WNL -25 to 100 P! -15 to 130   L Knee MMT 5/5 3/5 4/5   5xSTS 17.18\" 16.80\"  +1 foam  18.63\"   TUG  10.25\" No SPC  +1 foam   11.95\" 8.40\"       Access Code: [email or web address]  URL: [email or web address]  Date: 07/11/2024  Prepared by: Radha Huynh    Exercises  - Supine Gluteal Sets  - 1 x daily - 7 x weekly - 2-3 sets - 10 reps - 10 seconds hold  - Supine Quad Set  - 3-4 x daily - 7 x weekly - 1-2 sets - 10 reps - 5-10 second hold  - Supine Heel Slide with Strap  - 3-4 x daily - 7 x weekly - 3 sets - 10 reps  - Active Straight Leg Raise with Quad Set  - 1-2 x daily - 7 x weekly - 3 sets - 10 reps  - Supine Bridge  - 1 x daily - 7 x weekly - 3 sets - 10 reps  - Seated Long Arc Quad  - 1 x daily - 7 x weekly - 3 sets - 10 reps  - Seated Ankle Pumps on Table  - 1 x daily - 7 x weekly - 3 sets - 10 reps  - Heel Raises with Counter Support  - 1 x daily - 7 x weekly - 3 sets - 10 reps             Manuals  8/15 8/20 8/22 8/27 8/29 9/5   L Knee PROM  AR SA AR AR AR    L " "Tibiofemoral Posterior Mob  AR  AR AR AR               Neuro Re-Ed          Quadset  + NMES  Level 28  10\"x20 10\"x20 10\"x20 Bolster under ankle  5\"x20 Bolster under ankle  5\"x20    SLR  +strap  2x10 No strap   2x10 No strap  2x10 #2  3x10 10x    Eccentric FWD Step-Downs    4\" step  10x   4\" step  10x   SL Balance      HEP               Ther Ex          HEP review / edu/ Patient education/expectations          NuStep or RB  Seat: 9  Arms: 10   10' L4   RB 10' RB 10' RB 10' RB 8' NP    Heel Slides + Strap Assist  5\"x10 5\"x10 5\"x10 5\"x10 NP    LAQ 2x10 2x10 35x  # @ NV #3  3x10 #3  3x10    HS Curl   NV OTB  2x10  OTB  2x10    Heel Raises/Toe Raises 2x10 2x10 6\" step  3x10   4\" step  3x10   STS NV 2X10 Green therpad on  RLE  2x8 NP 3x8 #4  3x8    Leg Press    NV #40  3x10 #40  3x10 #55  4x8   Standing Hip Abduction      20x B/L                          Ther Activity           FWD Step-Ups  NV 4\" 1x10 6\"   15x L  6\" step  20x L     9\"   2x10    LAT Step-Ups  NV 4\" 1x10 9\"  15x L  9\" step  20x L   9\" step  2x10  eccentric   Gait Training                                Modalities           CP  8' 8' 8' #5 NP                              "

## 2024-09-05 NOTE — LETTER
2024    Guillermo Jacobo MD  [address] [place] [address]    Patient: Eddie Perez Jr.   YOB: 1950   Date of Visit: 2024     Encounter Diagnosis     ICD-10-CM    1. Status post total knee replacement, left  Z96.652       2. Primary osteoarthritis of left knee  M17.12           Dear Dr. Jacobo:    Thank you for your recent referral of Eddie Perez Jr.. Please review the attached evaluation summary from Eddie's recent visit.     Please verify that you agree with the plan of care by signing the attached order.     If you have any questions or concerns, please do not hesitate to call.     I sincerely appreciate the opportunity to share in the care of one of your patients and hope to have another opportunity to work with you in the near future.       Sincerely,    Radha Huynh, PT      Referring Provider:      I certify that I have read the below Plan of Care and certify the need for these services furnished under this plan of treatment while under my care.                    Guillermo Jacobo MD  [address] [place] [address]  Via Fax: [phone]          Post-Op LEFT TKA Re-Evaluation    Today's date: 2024  Patient name: Eddie Perez Jr.  : 1950  MRN: [id number]  Referring provider: Guillermo Jacobo MD  Dx:   Encounter Diagnosis     ICD-10-CM    1. Status post total knee replacement, left  Z96.652       2. Primary osteoarthritis of left knee  M17.12           Start Time: 1220  Stop Time: 1300  Total time in clinic (min): 40 minutes    Assessment  Impairments: abnormal gait, abnormal muscle firing, abnormal muscle tone, abnormal or restricted ROM, abnormal movement, activity intolerance, impaired balance, impaired physical strength, lacks appropriate home exercise program, pain with function, weight-bearing intolerance, poor posture , poor body mechanics, participation limitations, activity limitations and endurance  Symptom irritability:  moderate    Assessment details: Eddie Perez Jr. is a 73 y.o. male who presents s/p 1 month LEFT TKA underwent on 8/5/24 performed at [facility] via Guillermo Jacobo MD. Upon clinical post-op testing, patient demonstrated improvements in his functional capacity and mobility per TUG, MMT, and passive range of motion testing. Although 5xSTS performance was slower than newly post-op, I anticipate this is due to the removal of chair height modification, no UE support and pain. He continues to lack TKE, however, when compared to baseline pre-operatively, he is progressing. I discussed and provided additional ways to perform TKE at home, he MUST continue to be diligent and consistent on performing HEP to facilitate extension. Patient continues to be functional limited due to high levels of pain which is still anticipated at this stage. Patient can benefit from encouragement and consistent education on functional expectations. Pt would benefit from skilled OP physical therapy to address these, and the below impairments in order to optimize outcomes and promote return to functional baseline.       Patient verbalized understanding of POC.     Please contact me if you have any questions or recommendations. Thank you for the referral and the opportunity to share in Eddie's care      Barriers to intervention: medical complexity  Understanding of Dx/Px/POC: good     Prognosis: good    Goals  Short Term Goals:  In 4-6 weeks, the patient will:  1. Pt will report at least a 25% reduction in subjective pain complaints/symptoms to better manage ADLs and household chores. MET  2. Pt will have improve atleast half a grade per LE MMT MET  3. Pt independent with initial HEP, rationale, technique and frequency, for ROM and pain control. MET  4. Pt will have gain 50% improvement in knee AROM MET  5. Pt will be ambulating with least restrictive AD for household distances MET        Long Term Goals:  In 12 weeks, the patient will:  1. Pt  will have atleast 4/5 graded MMT and WNL knee AROM expected post-operatively PROGRESSING  2. FOTO to greater than predicted value. MET  3. Independent with comprehensive HEP upon discharge. PROGRESSING  4. Pt will be able to perform ADLs, iADLS, and household duties with minimal restriction indicating return to PLOF. PROGRESSING  5. Pt independent with rationale, technique and plan for performance of advanced HEP to ensure independent self-management of symptoms upon discharge. PROGRESSING      Plan  Patient would benefit from: skilled physical therapy and PT eval  Referral necessary: No  Planned modality interventions: cryotherapy, biofeedback and TENS    Planned therapy interventions: activity modification, ADL retraining, joint mobilization, manual therapy, massage, balance, balance/weight bearing training, behavior modification, body mechanics training, muscle pump exercises, motor coordination training, nerve gliding, neuromuscular re-education, patient education, postural training, community reintegration, self care, sensory integrative techniques, strengthening, stretching, therapeutic activities, therapeutic exercise, therapeutic training, home exercise program, graded motor, graded exercise, graded activity, functional ROM exercises, gait training, IASTM, orthotic management and training, patient/caregiver education and transfer training    Frequency: 2x week  Plan of Care beginning date: 8/9/2024  Plan of Care expiration date: 11/15/2024  Treatment plan discussed with: patient        Subjective Evaluation    History of Present Illness  Date of surgery: 8/5/2024  Mechanism of injury: surgery  Mechanism of injury: Patient presents s/p 1 month LEFT TKA underwent on 8/5/24 performed at [facility] via Guillermo Jacobo MD. Patient notes improvement since participating in skilled PT. He continues to report high levels of pain that requires him to continue to utilize opioids as needed. Patient continues to report gnawing  "deep pain even after performing exericses/WB tasks. Sleeping hasn't been well due to the pain. He expresses his frustration of how slow the healing timeline has been.            Not a recurrent problem   Quality of life: good    Patient Goals  Patient goals for therapy: decreased pain, improved balance, increased motion, return to sport/leisure activities, independence with ADLs/IADLs, increased strength and decreased edema    Pain  Current pain rating: 3  At best pain ratin  At worst pain ratin  Location: L Knee Pain  Quality: dull ache, discomfort, sharp and squeezing  Relieving factors: relaxation, rest and support  Aggravating factors: stair climbing, walking and lifting    Social Support  Steps to enter house: yes  3  Lives in: one-story house  Lives with: spouse    Employment status: not working    Diagnostic Tests  No diagnostic tests performed  Treatments  Previous treatment: medication and injection treatment  Current treatment: physical therapy        Objective      Range of Motion: Goniometric revealed the following findings (in degrees).  Joint Motion Left: 24 Left: 24 Left: 24   Knee Extension -10 P! -25 P! -15 P!   Knee Flexion WNL P! 100 P! WNL     Strength: MMT revealed the following findings.  Joint Motion Left: 24 Left: 24 Left: 24   Knee Extension 5/5 P! 3/5 4/5   Knee Flexion 5/5 P! 3/5 5/5     Additional Assessments:  Gait:     Balance:   Functional Capacity Tests Scores  Pre-Op 24 Scores  Post-Op 24 Scores:  24   5xSTS 17.18\" 16.80\"  +1 foam  18.63\"   TUG                 10.25\" No SPC  +1 foam  11.95\" 8.40\"             Precautions: GERD, Stage 3a CKD, Memory Difficulty, Prediabetes, HL (hearing loss)     POC expires Unit limit Auth Expiration date PT/OT + Visit Limit?   11/15/24 BOMN 24 BOMN              Pertinent Findings:      POC End Date: 11/15/24                                                                                        " "  Test / Measure  7/11/24  Pre-Op 8/9/24  Post-Op 9/5/24   FOTO (Predicted ) At Post-Op NP due to wrong body part    Knee AROM -10 to WNL -25 to 100 P! -15 to 130   L Knee MMT 5/5 3/5 4/5   5xSTS 17.18\" 16.80\"  +1 foam  18.63\"   TUG  10.25\" No SPC  +1 foam   11.95\" 8.40\"       Access Code: [email or web address]  URL: [email or web address]  Date: 07/11/2024  Prepared by: Radha Huynh    Exercises  - Supine Gluteal Sets  - 1 x daily - 7 x weekly - 2-3 sets - 10 reps - 10 seconds hold  - Supine Quad Set  - 3-4 x daily - 7 x weekly - 1-2 sets - 10 reps - 5-10 second hold  - Supine Heel Slide with Strap  - 3-4 x daily - 7 x weekly - 3 sets - 10 reps  - Active Straight Leg Raise with Quad Set  - 1-2 x daily - 7 x weekly - 3 sets - 10 reps  - Supine Bridge  - 1 x daily - 7 x weekly - 3 sets - 10 reps  - Seated Long Arc Quad  - 1 x daily - 7 x weekly - 3 sets - 10 reps  - Seated Ankle Pumps on Table  - 1 x daily - 7 x weekly - 3 sets - 10 reps  - Heel Raises with Counter Support  - 1 x daily - 7 x weekly - 3 sets - 10 reps             Manuals  8/15 8/20 8/22 8/27 8/29 9/5   L Knee PROM  AR SA AR AR AR    L Tibiofemoral Posterior Mob  AR  AR AR AR               Neuro Re-Ed          Quadset  + NMES  Level 28  10\"x20 10\"x20 10\"x20 Bolster under ankle  5\"x20 Bolster under ankle  5\"x20    SLR  +strap  2x10 No strap   2x10 No strap  2x10 #2  3x10 10x    Eccentric FWD Step-Downs    4\" step  10x   4\" step  10x   SL Balance      HEP               Ther Ex          HEP review / edu/ Patient education/expectations          NuStep or RB  Seat: 9  Arms: 10   10' L4   RB 10' RB 10' RB 10' RB 8' NP    Heel Slides + Strap Assist  5\"x10 5\"x10 5\"x10 5\"x10 NP    LAQ 2x10 2x10 35x  # @ NV #3  3x10 #3  3x10    HS Curl   NV OTB  2x10  OTB  2x10    Heel Raises/Toe Raises 2x10 2x10 6\" step  3x10   4\" step  3x10   STS NV 2X10 Green therpad on  RLE  2x8 NP 3x8 #4  3x8    Leg Press    NV #40  3x10 #40  3x10 #55  4x8   Standing Hip Abduction      20x " "B/L                          Ther Activity           FWD Step-Ups  NV 4\" 1x10 6\"   15x L  6\" step  20x L     9\"   2x10    LAT Step-Ups  NV 4\" 1x10 9\"  15x L  9\" step  20x L   9\" step  2x10  eccentric   Gait Training                                Modalities           CP  8' 8' 8' #5 NP                                              "

## 2024-09-10 ENCOUNTER — OFFICE VISIT (OUTPATIENT)
Dept: PHYSICAL THERAPY | Facility: REHABILITATION | Age: 74
End: 2024-09-10
Payer: MEDICARE

## 2024-09-10 DIAGNOSIS — M17.12 PRIMARY OSTEOARTHRITIS OF LEFT KNEE: ICD-10-CM

## 2024-09-10 DIAGNOSIS — Z96.652 STATUS POST TOTAL KNEE REPLACEMENT, LEFT: Primary | ICD-10-CM

## 2024-09-10 PROCEDURE — 97110 THERAPEUTIC EXERCISES: CPT

## 2024-09-10 PROCEDURE — 97112 NEUROMUSCULAR REEDUCATION: CPT

## 2024-09-10 NOTE — PROGRESS NOTES
"Daily Note     Today's date: 9/10/2024  Patient name: Eddie Perez Jr.  : 1950  MRN: [id number]  Referring provider: Edwin Goodrich PA*  Dx:   Encounter Diagnosis     ICD-10-CM    1. Status post total knee replacement, left  Z96.652       2. Primary osteoarthritis of left knee  M17.12           Start Time: 1145  Stop Time: 1230  Total time in clinic (min): 45 minutes    Subjective: Patient has good and bad days. He notes a knot right above his patella. Last night he has a lot of soreness.       Objective: See treatment diary below       Assessment: Patient tolerated treatment well. Patient is progressing appropriately in physical therapy. Patient will continue to be appropriate for skilled outpatient physical therapy in order to address impairments and pain s/p L TKA.       Plan: Continue per plan of care.      Precautions: GERD, Stage 3a CKD, Memory Difficulty, Prediabetes, HL (hearing loss)     POC expires Unit limit Auth Expiration date PT/OT + Visit Limit?   11/15/24 BOMN 24 BOMN              Pertinent Findings:      POC End Date: 11/15/24                                                                                          Test / Measure  24  Pre-Op 24  Post-Op 24   FOTO (Predicted ) At Post-Op NP due to wrong body part    Knee AROM -10 to WNL -25 to 100 P! -15 to 130   L Knee MMT 5 3/5 4/5   5xSTS 17.18\" 16.80\"  +1 foam  18.63\"   TUG  10.25\" No SPC  +1 foam   11.95\" 8.40\"       Access Code: [email or web address]  URL: [email or web address]  Date: 2024  Prepared by: Radha Huynh    Exercises  - Supine Gluteal Sets  - 1 x daily - 7 x weekly - 2-3 sets - 10 reps - 10 seconds hold  - Supine Quad Set  - 3-4 x daily - 7 x weekly - 1-2 sets - 10 reps - 5-10 second hold  - Supine Heel Slide with Strap  - 3-4 x daily - 7 x weekly - 3 sets - 10 reps  - Active Straight Leg Raise with Quad Set  - 1-2 x daily - 7 x weekly - 3 sets - 10 reps  - Supine Bridge  - 1 x daily - " "7 x weekly - 3 sets - 10 reps  - Seated Long Arc Quad  - 1 x daily - 7 x weekly - 3 sets - 10 reps  - Seated Ankle Pumps on Table  - 1 x daily - 7 x weekly - 3 sets - 10 reps  - Heel Raises with Counter Support  - 1 x daily - 7 x weekly - 3 sets - 10 reps             Manuals  8/15 8/20 8/22 8/27 8/29 9/5 9/10   L Knee PROM  AR SA AR AR AR  AR   L Tibiofemoral Posterior Mob  AR  AR AR AR                 Neuro Re-Ed           Quadset  + NMES  Level 28  10\"x20 10\"x20 10\"x20 Bolster under ankle  5\"x20 Bolster under ankle  5\"x20     SLR  +strap  2x10 No strap   2x10 No strap  2x10 #2  3x10 10x     Eccentric FWD Step-Downs    4\" step  10x   4\" step  10x 6\" step   20x    SAQ with bolster        5\"x10   Ther Ex           HEP review / edu/ Patient education/expectations           NuStep or RB  Seat: 9  Arms: 10   10' L4   RB 10' RB 10' RB 10' RB 8' NP  RB 10'  Level 4   Heel Slides + Strap Assist  5\"x10 5\"x10 5\"x10 5\"x10 NP     LAQ 2x10 2x10 35x  # @ NV #3  3x10 #3  3x10  #5  2x10   HS Curl   NV OTB  2x10  OTB  2x10     Heel Raises/Toe Raises 2x10 2x10 6\" step  3x10   4\" step  3x10    STS NV 2X10 Green therpad on  RLE  2x8 NP 3x8 #4  3x8 #8  3x6    Leg Press    NV #40  3x10 #40  3x10 #55  4x8 #70  3x10   Standing Hip Abduction      20x B/L  30x B/L    Side-Stepping        BTB  2 laps                Ther Activity            FWD Step-Ups  NV 4\" 1x10 6\"   15x L  6\" step  20x L     9\"   2x10     LAT Step-Ups  NV 4\" 1x10 9\"  15x L  9\" step  20x L   9\" step  2x10  eccentric Eccentric  9\"  2x10   Gait Training                                   Modalities            CP  8' 8' 8' #5 NP                                "

## 2024-09-12 ENCOUNTER — OFFICE VISIT (OUTPATIENT)
Dept: PHYSICAL THERAPY | Facility: REHABILITATION | Age: 74
End: 2024-09-12
Payer: MEDICARE

## 2024-09-12 DIAGNOSIS — M17.12 PRIMARY OSTEOARTHRITIS OF LEFT KNEE: ICD-10-CM

## 2024-09-12 DIAGNOSIS — Z96.652 STATUS POST TOTAL KNEE REPLACEMENT, LEFT: Primary | ICD-10-CM

## 2024-09-12 PROCEDURE — 97140 MANUAL THERAPY 1/> REGIONS: CPT

## 2024-09-12 PROCEDURE — 97110 THERAPEUTIC EXERCISES: CPT

## 2024-09-12 NOTE — PROGRESS NOTES
"Daily Note     Today's date: 2024  Patient name: Eddie Perez Jr.  : 1950  MRN: [id number]  Referring provider: Edwin Goodrich PA*  Dx:   Encounter Diagnosis     ICD-10-CM    1. Status post total knee replacement, left  Z96.652       2. Primary osteoarthritis of left knee  M17.12           Start Time: 1315  Stop Time: 1400  Total time in clinic (min): 45 minutes    Subjective: Patient had significant soreness and tightness along quad and anterior knee since last visit.       Objective: See treatment diary below       Assessment: Patient tolerated treatment well. Focused on table exercise due to residual soreness from Tuesday. Continue educate patient on expected post-therapy DOMs and healing timelines. We discussed modalities usage to control inflammation and pain that doesn't involve pharmaceuticals. Patient will continue to be dilligent with ice usage, may trial heat on quad.  Patient will continue to be appropriate for skilled outpatient physical therapy in order to address impairments and pain s/p L TKA.       Plan: Continue per plan of care.        Precautions: GERD, Stage 3a CKD, Memory Difficulty, Prediabetes, HL (hearing loss)     POC expires Unit limit Auth Expiration date PT/OT + Visit Limit?   11/15/24 BOMN 24 BOMN              Pertinent Findings:      POC End Date: 11/15/24                                                                                          Test / Measure  24  Pre-Op 24  Post-Op 24   FOTO (Predicted ) At Post-Op NP due to wrong body part    Knee AROM -10 to WNL -25 to 100 P! -15 to 130   L Knee MMT 5/5 3/5 4/5   5xSTS 17.18\" 16.80\"  +1 foam  18.63\"   TUG  10.25\" No SPC  +1 foam   11.95\" 8.40\"       Access Code: [email or web address]  URL: [email or web address]  Date: 2024  Prepared by: Radha Huynh    Exercises  - Supine Gluteal Sets  - 1 x daily - 7 x weekly - 2-3 sets - 10 reps - 10 seconds hold  - Supine Quad Set  - 3-4 x daily - " "7 x weekly - 1-2 sets - 10 reps - 5-10 second hold  - Supine Heel Slide with Strap  - 3-4 x daily - 7 x weekly - 3 sets - 10 reps  - Active Straight Leg Raise with Quad Set  - 1-2 x daily - 7 x weekly - 3 sets - 10 reps  - Supine Bridge  - 1 x daily - 7 x weekly - 3 sets - 10 reps  - Seated Long Arc Quad  - 1 x daily - 7 x weekly - 3 sets - 10 reps  - Seated Ankle Pumps on Table  - 1 x daily - 7 x weekly - 3 sets - 10 reps  - Heel Raises with Counter Support  - 1 x daily - 7 x weekly - 3 sets - 10 reps             Manuals  8/15 8/20 8/22 8/27 8/29 9/5 9/10 9/12   L Knee PROM  AR SA AR AR AR  AR AR   STM along quad        AR   L Tibiofemoral Posterior Mob  AR  AR AR AR                   Neuro Re-Ed            Quadset  + NMES  Level 28  10\"x20 10\"x20 10\"x20 Bolster under ankle  5\"x20 Bolster under ankle  5\"x20      SLR  +strap  2x10 No strap   2x10 No strap  2x10 #2  3x10 10x   2x10   Eccentric FWD Step-Downs    4\" step  10x   4\" step  10x 6\" step   20x    Bridge on Pball        3\"  2x10    SAQ with bolster        5\"x10 5\"x15   Ther Ex            HEP review / edu/ Patient education/expectations            NuStep or RB  Seat: 9  Arms: 10   10' L4   RB 10' RB 10' RB 10' RB 8' NP  RB 10'  Level 4 RB 8'   Lvl 2   LAQ 2x10 2x10 35x  # @ NV #3  3x10 #3  3x10  #5  2x10 #5  2x10   HS Curl   NV OTB  2x10  OTB  2x10      Heel Raises/Toe Raises 2x10 2x10 6\" step  3x10   4\" step  3x10     STS NV 2X10 Green therpad on  RLE  2x8 NP 3x8 #4  3x8 #8  3x6 3x10  Defer #    Leg Press    NV #40  3x10 #40  3x10 #55  4x8 #70  3x10 #55  3x10   Standing Hip Abduction      20x B/L  30x B/L 3x10  B/L    Side-Stepping        BTB  2 laps  BTB  2 laps                Ther Activity             FWD Step-Ups  NV 4\" 1x10 6\"   15x L  6\" step  20x L     9\"   2x10      LAT Step-Ups  NV 4\" 1x10 9\"  15x L  9\" step  20x L   9\" step  2x10  eccentric Eccentric  9\"  2x10    Gait Training                                      Modalities             CP  8' 8' 8' " #5 NP

## 2024-09-17 ENCOUNTER — OFFICE VISIT (OUTPATIENT)
Dept: PHYSICAL THERAPY | Facility: REHABILITATION | Age: 74
End: 2024-09-17
Payer: MEDICARE

## 2024-09-17 DIAGNOSIS — M17.12 PRIMARY OSTEOARTHRITIS OF LEFT KNEE: ICD-10-CM

## 2024-09-17 DIAGNOSIS — Z96.652 STATUS POST TOTAL KNEE REPLACEMENT, LEFT: Primary | ICD-10-CM

## 2024-09-17 PROCEDURE — 97112 NEUROMUSCULAR REEDUCATION: CPT

## 2024-09-17 PROCEDURE — 97110 THERAPEUTIC EXERCISES: CPT

## 2024-09-17 NOTE — PROGRESS NOTES
"Daily Note     Today's date: 2024  Patient name: Eddie Perez Jr.  : 1950  MRN: [id number]  Referring provider: Edwin Goodrich PA*  Dx:   Encounter Diagnosis     ICD-10-CM    1. Status post total knee replacement, left  Z96.652       2. Primary osteoarthritis of left knee  M17.12           Start Time: 1130  Stop Time: 1215  Total time in clinic (min): 45 minutes    Subjective: Patient presents no change. He continues to require pain medications and consistent icing on knee. He expresses his frustration regarding the sore and stiffness.       Objective: See treatment diary below       Assessment: Patient tolerated treatment well. Patient reports consistent quad and patellar tendon irritability with tissue loading which is expected.  Patient will continue to be dilligent with ice usage, may trial heat on quad. Functionally, is progressing well. Patient will continue to be appropriate for skilled outpatient physical therapy in order to address impairments and pain s/p L TKA.       Plan: Continue per plan of care.        Precautions: GERD, Stage 3a CKD, Memory Difficulty, Prediabetes, HL (hearing loss)     POC expires Unit limit Auth Expiration date PT/OT + Visit Limit?   11/15/24 BOMN 24 BOMN              Pertinent Findings:      POC End Date: 11/15/24                                                                                          Test / Measure  24  Pre-Op 24  Post-Op 24   FOTO (Predicted ) At Post-Op NP due to wrong body part    Knee AROM -10 to WNL -25 to 100 P! -15 to 130   L Knee MMT 5/5 3/5 4/5   5xSTS 17.18\" 16.80\"  +1 foam  18.63\"   TUG  10.25\" No SPC  +1 foam   11.95\" 8.40\"       Access Code: [email or web address]  URL: [email or web address]  Date: 2024  Prepared by: Radha Huynh    Exercises  - Supine Gluteal Sets  - 1 x daily - 7 x weekly - 2-3 sets - 10 reps - 10 seconds hold  - Supine Quad Set  - 3-4 x daily - 7 x weekly - 1-2 sets - 10 reps - " "5-10 second hold  - Supine Heel Slide with Strap  - 3-4 x daily - 7 x weekly - 3 sets - 10 reps  - Active Straight Leg Raise with Quad Set  - 1-2 x daily - 7 x weekly - 3 sets - 10 reps  - Supine Bridge  - 1 x daily - 7 x weekly - 3 sets - 10 reps  - Seated Long Arc Quad  - 1 x daily - 7 x weekly - 3 sets - 10 reps  - Seated Ankle Pumps on Table  - 1 x daily - 7 x weekly - 3 sets - 10 reps  - Heel Raises with Counter Support  - 1 x daily - 7 x weekly - 3 sets - 10 reps             Manuals 8/22 8/27 8/29 9/5 9/10 9/12 9/17   L Knee PROM AR AR AR  AR AR    STM along quad      AR    L Tibiofemoral Posterior Mob AR AR AR                  Neuro Re-Ed          Quadset 10\"x20 Bolster under ankle  5\"x20 Bolster under ankle  5\"x20       SLR No strap  2x10 #2  3x10 10x   2x10    Eccentric FWD Step-Downs 4\" step  10x   4\" step  10x 6\" step   20x     Bridge on Pball      3\"  2x10     SAQ with bolster     5\"x10 5\"x15    Ther Ex          HEP review / edu/ Patient education/expectations          NuStep or RB  Seat: 9  Arms: 10  RB 10' RB 10' RB 8' NP  RB 10'  Level 4 RB 8'   Lvl 2 RB 8'  Lvl 3-4   LAQ 35x  # @ NV #3  3x10 #3  3x10  #5  2x10 #5  2x10 [place]  #5  3x10   HS Curl NV OTB  2x10  OTB  2x10    Seated  #7  2x10   Heel Raises/Toe Raises 6\" step  3x10   4\" step  3x10      STS Green therpad on  RLE  2x8 NP 3x8 #4  3x8 #8  3x6 3x10  Defer # 3x10    Leg Press NV #40  3x10 #40  3x10 #55  4x8 #70  3x10 #55  3x10 #70  3x10    SL  #25  10x   Standing Hip Abduction   20x B/L  30x B/L 3x10  B/L 3x10   B/L   Standing Hip Ext       3x10 B/L    Side-Stepping     BTB  2 laps  BTB  2 laps BTB  3 laps              Ther Activity           FWD Step-Ups 6\"   15x L  6\" step  20x L     9\"   2x10   9\" step  #8   2x10  LLE    LAT Step-Ups 9\"  15x L  9\" step  20x L   9\" step  2x10  eccentric Eccentric  9\"  2x10  9\" step  #5 b/l  2x10   LLE   Gait Training                                Modalities           CP 8' #5 NP                               "

## 2024-09-19 ENCOUNTER — OFFICE VISIT (OUTPATIENT)
Dept: PHYSICAL THERAPY | Facility: REHABILITATION | Age: 74
End: 2024-09-19
Payer: MEDICARE

## 2024-09-19 DIAGNOSIS — M17.12 PRIMARY OSTEOARTHRITIS OF LEFT KNEE: ICD-10-CM

## 2024-09-19 DIAGNOSIS — Z96.652 STATUS POST TOTAL KNEE REPLACEMENT, LEFT: Primary | ICD-10-CM

## 2024-09-19 DIAGNOSIS — Z01.818 PRE-OP TESTING: ICD-10-CM

## 2024-09-19 PROCEDURE — 97140 MANUAL THERAPY 1/> REGIONS: CPT

## 2024-09-19 PROCEDURE — 97110 THERAPEUTIC EXERCISES: CPT

## 2024-09-19 NOTE — PROGRESS NOTES
Daily Note     Today's date: 2024  Patient name: Eddie Perez Jr.  : 1950  MRN: [id number]  Referring provider: Edwin Goodrich PA*  Dx:   Encounter Diagnosis     ICD-10-CM    1. Status post total knee replacement, left  Z96.652       2. Primary osteoarthritis of left knee  M17.12       3. Pre-op testing  Z01.818             Start Time: 1200  Stop Time: 1240  Total time in clinic (min): 40 minutes      Subjective: Patient reports no change in previously noted L knee sxs and feels as if he is at a standstill in regards to his recovery. Patient continues to note consistent, nonstop quad pain and discomfort all throughout his days and does not seem to get relief no matter what. Patient notes that he has a follow up ortho appt next week and is hoping to get some direction on how to alleviate his sxs.      Objective: See treatment diary below       Assessment: Tolerated treatment fairly well. Patient continued to experience L quad pain and discomfort throughout performance of exercises today, therefore modified POC by decreasing intensity of exercises as noted on flowsheet below, with fair tolerance demonstrated by patient. Good tolerance to PROM today in all planes with no presence of muscle guarding and mild pain at end ranges. Muscle fatigue present at the conclusion of session. Resume previous internist of POC NV if tolerable for patient. Patient would benefit from continued PT to address L knee deficits in order to maximize overall functional ability.      Plan: Continue per plan of care.        Precautions: GERD, Stage 3a CKD, Memory Difficulty, Prediabetes, HL (hearing loss)     1:1 with PTA from [id number]      POC expires Unit limit Auth Expiration date PT/OT + Visit Limit?   11/15/24 BOMN 24 BOMN              Pertinent Findings:      POC End Date: 11/15/24                                                                                          Test / Measure  24  Pre-Op  "8/9/24  Post-Op 9/5/24   FOTO (Predicted ) At Post-Op NP due to wrong body part    Knee AROM -10 to WNL -25 to 100 P! -15 to 130   L Knee MMT 5/5 3/5 4/5   5xSTS 17.18\" 16.80\"  +1 foam  18.63\"   TUG  10.25\" No SPC  +1 foam   11.95\" 8.40\"       Access Code: [email or web address]  URL: [email or web address]  Date: 07/11/2024  Prepared by: Radha Huynh    Exercises  - Supine Gluteal Sets  - 1 x daily - 7 x weekly - 2-3 sets - 10 reps - 10 seconds hold  - Supine Quad Set  - 3-4 x daily - 7 x weekly - 1-2 sets - 10 reps - 5-10 second hold  - Supine Heel Slide with Strap  - 3-4 x daily - 7 x weekly - 3 sets - 10 reps  - Active Straight Leg Raise with Quad Set  - 1-2 x daily - 7 x weekly - 3 sets - 10 reps  - Supine Bridge  - 1 x daily - 7 x weekly - 3 sets - 10 reps  - Seated Long Arc Quad  - 1 x daily - 7 x weekly - 3 sets - 10 reps  - Seated Ankle Pumps on Table  - 1 x daily - 7 x weekly - 3 sets - 10 reps  - Heel Raises with Counter Support  - 1 x daily - 7 x weekly - 3 sets - 10 reps             Manuals 8/22 8/27 8/29 9/5 9/10 9/12 9/17 9/19   L Knee PROM AR AR AR  AR AR  KR   STM along quad      AR  KR   L Tibiofemoral Posterior Mob AR AR AR                    Neuro Re-Ed           Quadset 10\"x20 Bolster under ankle  5\"x20 Bolster under ankle  5\"x20        SLR No strap  2x10 #2  3x10 10x   2x10     Eccentric FWD Step-Downs 4\" step  10x   4\" step  10x 6\" step   20x      Bridge on Pball      3\"  2x10      SAQ with bolster     5\"x10 5\"x15     Ther Ex           HEP review / edu/ Patient education/expectations           NuStep or RB  Seat: 9  Arms: 10  RB 10' RB 10' RB 8' NP  RB 10'  Level 4 RB 8'   Lvl 2 RB 8'  Lvl 3-4 RB 8'  Lvl 3-4   LAQ 35x  # @ NV #3  3x10 #3  3x10  #5  2x10 #5  2x10 [place]  #5  3x10    HS Curl NV OTB  2x10  OTB  2x10    Seated  #7  2x10    Heel Raises/Toe Raises 6\" step  3x10   4\" step  3x10       STS Green therpad on  RLE  2x8 NP 3x8 #4  3x8 #8  3x6 3x10  Defer # 3x10 3x10    Leg Press NV #40  3x10 " "#40  3x10 #55  4x8 #70  3x10 #55  3x10 #70  3x10    SL  #25  10x #70  3x10    SL  #25  10x   Standing Hip Abduction   20x B/L  30x B/L 3x10  B/L 3x10   B/L 3x10 B/L   Standing Hip Ext       3x10 B/L 3x10 B/L    Side-Stepping     BTB  2 laps  BTB  2 laps BTB  3 laps BTB 3 laps               Ther Activity            FWD Step-Ups 6\"   15x L  6\" step  20x L     9\"   2x10   9\" step  #8   2x10  LLE 9\" 2x10    LAT Step-Ups 9\"  15x L  9\" step  20x L   9\" step  2x10  eccentric Eccentric  9\"  2x10  9\" step  #5 b/l  2x10   LLE 9\" 2x10   Gait Training                                   Modalities            CP 8' #5 NP                                   "

## 2024-09-24 ENCOUNTER — OFFICE VISIT (OUTPATIENT)
Dept: PHYSICAL THERAPY | Facility: REHABILITATION | Age: 74
End: 2024-09-24
Payer: MEDICARE

## 2024-09-24 DIAGNOSIS — M17.12 PRIMARY OSTEOARTHRITIS OF LEFT KNEE: ICD-10-CM

## 2024-09-24 DIAGNOSIS — Z96.652 STATUS POST TOTAL KNEE REPLACEMENT, LEFT: Primary | ICD-10-CM

## 2024-09-24 DIAGNOSIS — Z01.818 PRE-OP TESTING: ICD-10-CM

## 2024-09-24 PROCEDURE — 97140 MANUAL THERAPY 1/> REGIONS: CPT

## 2024-09-24 PROCEDURE — 97110 THERAPEUTIC EXERCISES: CPT

## 2024-09-24 NOTE — PROGRESS NOTES
"Daily Note     Today's date: 2024  Patient name: Eddie Perez Jr.  : 1950  MRN: [id number]  Referring provider: Edwin Goodrich PA*  Dx:   Encounter Diagnosis     ICD-10-CM    1. Status post total knee replacement, left  Z96.652       2. Primary osteoarthritis of left knee  M17.12       3. Pre-op testing  Z01.818             Start Time: 1100  Stop Time: 1140  Total time in clinic (min): 40 minutes      Subjective: Patient reports no alleviation from symptoms including deep dull ache and pain in L knee. He notes taht he has not taken any narcotics since 10pm last night and his symptoms are never ending. He notes that he has a \"lump\" above his L knee and it feels as if there is a \"bean bag\" in his knee. He notes no improvement with symptoms.       Objective: See treatment diary below       Assessment: Treatment was limited this visit due to pt's exacerbations of symptoms. ISTM was attempted on L quad. Pt had pain and discomfort with modality. ROM was not tolerated well due to pain and discomfort. CP was applied to help decrease increased symptoms. Pt advised to discuss concerns with surgeon.  Resume previous internist of POC NV if tolerable for patient. Patient would benefit from continued PT to address L knee deficits in order to maximize overall functional ability.      Plan: Continue per plan of care.        Precautions: GERD, Stage 3a CKD, Memory Difficulty, Prediabetes, HL (hearing loss)     1:1 with PTA from [id number]      POC expires Unit limit Auth Expiration date PT/OT + Visit Limit?   11/15/24 BOMN 24 BOMN              Pertinent Findings:      POC End Date: 11/15/24                                                                                          Test / Measure  24  Pre-Op 24  Post-Op 24   FOTO (Predicted ) At Post-Op NP due to wrong body part    Knee AROM -10 to WNL -25 to 100 P! -15 to 130   L Knee MMT 5/5 3/5 4/5   5xSTS 17.18\" 16.80\"  +1 foam  18.63\" " "  TUG  10.25\" No SPC  +1 foam   11.95\" 8.40\"       Access Code: [email or web address]  URL: [email or web address]  Date: 07/11/2024  Prepared by: Radha Huynh    Exercises  - Supine Gluteal Sets  - 1 x daily - 7 x weekly - 2-3 sets - 10 reps - 10 seconds hold  - Supine Quad Set  - 3-4 x daily - 7 x weekly - 1-2 sets - 10 reps - 5-10 second hold  - Supine Heel Slide with Strap  - 3-4 x daily - 7 x weekly - 3 sets - 10 reps  - Active Straight Leg Raise with Quad Set  - 1-2 x daily - 7 x weekly - 3 sets - 10 reps  - Supine Bridge  - 1 x daily - 7 x weekly - 3 sets - 10 reps  - Seated Long Arc Quad  - 1 x daily - 7 x weekly - 3 sets - 10 reps  - Seated Ankle Pumps on Table  - 1 x daily - 7 x weekly - 3 sets - 10 reps  - Heel Raises with Counter Support  - 1 x daily - 7 x weekly - 3 sets - 10 reps             Manuals 8/22 8/27 8/29 9/5 9/10 9/12 9/17 9/19 9/24   L Knee PROM AR AR AR  AR AR  KR SA   STM along quad      AR  KR IASTM SA   L Tibiofemoral Posterior Mob AR AR AR                      Neuro Re-Ed            Quadset 10\"x20 Bolster under ankle  5\"x20 Bolster under ankle  5\"x20         SLR No strap  2x10 #2  3x10 10x   2x10      Eccentric FWD Step-Downs 4\" step  10x   4\" step  10x 6\" step   20x       Bridge on Pball      3\"  2x10       SAQ with bolster     5\"x10 5\"x15      Ther Ex            HEP review / edu/ Patient education/expectations            NuStep or RB  Seat: 9  Arms: 10  RB 10' RB 10' RB 8' NP  RB 10'  Level 4 RB 8'   Lvl 2 RB 8'  Lvl 3-4 RB 8'  Lvl 3-4 RB 8'  Lvl 3-4   LAQ 35x  # @ NV #3  3x10 #3  3x10  #5  2x10 #5  2x10 [place]  #5  3x10     HS Curl NV OTB  2x10  OTB  2x10    Seated  #7  2x10     Heel Raises/Toe Raises 6\" step  3x10   4\" step  3x10        STS Green therpad on  RLE  2x8 NP 3x8 #4  3x8 #8  3x6 3x10  Defer # 3x10 3x10     Leg Press NV #40  3x10 #40  3x10 #55  4x8 #70  3x10 #55  3x10 #70  3x10    SL  #25  10x #70  3x10    SL  #25  10x    Standing Hip Abduction   20x B/L  30x B/L 3x10  B/L " "3x10   B/L 3x10 B/L    Standing Hip Ext       3x10 B/L 3x10 B/L     Side-Stepping     BTB  2 laps  BTB  2 laps BTB  3 laps BTB 3 laps                 Ther Activity             FWD Step-Ups 6\"   15x L  6\" step  20x L     9\"   2x10   9\" step  #8   2x10  LLE 9\" 2x10     LAT Step-Ups 9\"  15x L  9\" step  20x L   9\" step  2x10  eccentric Eccentric  9\"  2x10  9\" step  #5 b/l  2x10   LLE 9\" 2x10    Gait Training                                      Modalities             CP 8' #5 NP       10'                              "

## 2024-09-26 ENCOUNTER — OFFICE VISIT (OUTPATIENT)
Dept: PHYSICAL THERAPY | Facility: REHABILITATION | Age: 74
End: 2024-09-26
Payer: MEDICARE

## 2024-09-26 DIAGNOSIS — Z96.652 STATUS POST TOTAL KNEE REPLACEMENT, LEFT: Primary | ICD-10-CM

## 2024-09-26 DIAGNOSIS — M17.12 PRIMARY OSTEOARTHRITIS OF LEFT KNEE: ICD-10-CM

## 2024-09-26 PROCEDURE — 97140 MANUAL THERAPY 1/> REGIONS: CPT

## 2024-09-26 PROCEDURE — 97110 THERAPEUTIC EXERCISES: CPT

## 2024-09-26 NOTE — PROGRESS NOTES
"Daily Note     Today's date: 2024  Patient name: Eddie Perez Jr.  : 1950  MRN: [id number]  Referring provider: Edwin Goodrich PA*  Dx:   Encounter Diagnosis     ICD-10-CM    1. Status post total knee replacement, left  Z96.652       2. Primary osteoarthritis of left knee  M17.12           Start Time: 1303  Stop Time: 1356  Total time in clinic (min): 53 minutes      Subjective: Patient reports positive feedback from recent Ortho visit. X-rays were great per patient. Patient was given Celebrex moving forward for pain relief. Patient had quite a bit of soreness after IASTM.      Objective: See treatment diary below       Assessment: Patient tolerated treatment well. We focused heavily on quad and calf stretches to help with posterior capsule pain and tightness. Updated HEP on various stretches. Patient will continue to be appropriate for skilled outpatient physical therapy in order to address impairments and pain s/p L TKA.       Plan: Continue per plan of care.        Precautions: GERD, Stage 3a CKD, Memory Difficulty, Prediabetes, HL (hearing loss)       POC expires Unit limit Auth Expiration date PT/OT + Visit Limit?   11/15/24 BOMN 24 BOMN              Pertinent Findings:      POC End Date: 11/15/24                                                                                          Test / Measure  24  Pre-Op 24  Post-Op 24   FOTO (Predicted ) At Post-Op NP due to wrong body part    Knee AROM -10 to WNL -25 to 100 P! -15 to 130   L Knee MMT 5/5 3/5 4/5   5xSTS 17.18\" 16.80\"  +1 foam  18.63\"   TUG  10.25\" No SPC  +1 foam   11.95\" 8.40\"       Access Code: [email or web address]  URL: [email or web address]  Date: 2024  Prepared by: Radha Huynh    Exercises  - Quadricep Stretch with Chair and Counter Support  - 1-3 x daily - 7 x weekly - 3-4 sets - 1 reps - 30 seconds hold  - Prone Knee Flexion Stretch with Strap  - 1-3 x daily - 7 x weekly - 3-4 sets - 1 reps - " "30 seconds hold  - Standing Quad Stretch with Strap  - 1-3 x daily - 7 x weekly - 3-4 sets - 1 reps - 30 seconds hold  - Supine Quadriceps Stretch with Strap on Table  - 1-3 x daily - 7 x weekly - 3-4 sets - 1 reps - 30 second hold  - Gastroc Stretch on Wall  - 1-3 x daily - 7 x weekly - 3-4 sets - 1 reps - 30 second hold  - Gastroc Stretch with Foot at Wall  - 1-3 x daily - 7 x weekly - 3-4 sets - 1 reps - 30 seconds hold  - Standing Ankle Dorsiflexion Stretch on Chair  - 1-3 x daily - 7 x weekly - 3-4 sets - 1 reps - 30 seconds hold  - Seated Gastroc Stretch with Strap  - 1-3 x daily - 7 x weekly - 3-4 sets - 1 reps - 30 seconds hold        Manuals 8/22 8/27 8/29 9/5 9/10 9/12 9/17 9/19 9/24 9/26   L Knee PROM AR AR AR  AR AR  KR SA AR   STM along quad      AR  KR IASTM SA No instrument  AR   L Tibiofemoral Posterior Mob AR AR AR       AR + flexion/extension                 Neuro Re-Ed             Quadset 10\"x20 Bolster under ankle  5\"x20 Bolster under ankle  5\"x20          SLR No strap  2x10 #2  3x10 10x   2x10       Eccentric FWD Step-Downs 4\" step  10x   4\" step  10x 6\" step   20x        Bridge on Pball      3\"  2x10        SAQ with bolster     5\"x10 5\"x15       Ther Ex             HEP review / edu/ Patient education/expectations          Updated HEP  AR   NuStep or RB  Seat: 9  Arms: 10  RB 10' RB 10' RB 8' NP  RB 10'  Level 4 RB 8'   Lvl 2 RB 8'  Lvl 3-4 RB 8'  Lvl 3-4 RB 8'  Lvl 3-4 RB 8' Lvl 3   LAQ 35x  # @ NV #3  3x10 #3  3x10  #5  2x10 #5  2x10 [place]  #5  3x10   #5  3x10   HS Curl NV OTB  2x10  OTB  2x10    Seated  #7  2x10      Calf Stretch          At wall  30\"x4    Lunge at wall  30\"x3   Heel Raises/Toe Raises 6\" step  3x10   4\" step  3x10         STS Green therpad on  RLE  2x8 NP 3x8 #4  3x8 #8  3x6 3x10  Defer # 3x10 3x10      Leg Press NV #40  3x10 #40  3x10 #55  4x8 #70  3x10 #55  3x10 #70  3x10    SL  #25  10x #70  3x10    SL  #25  10x  #85  3x8,8,10    SL:  #25  2x10   HR on Leg Press           " "  Standing Hip Abduction   20x B/L  30x B/L 3x10  B/L 3x10   B/L 3x10 B/L                               Standing Hip Ext       3x10 B/L 3x10 B/L      Side-Stepping     BTB  2 laps  BTB  2 laps BTB  3 laps BTB 3 laps                   Ther Activity              FWD Step-Ups 6\"   15x L  6\" step  20x L     9\"   2x10   9\" step  #8   2x10  LLE 9\" 2x10      LAT Step-Ups 9\"  15x L  9\" step  20x L   9\" step  2x10  eccentric Eccentric  9\"  2x10  9\" step  #5 b/l  2x10   LLE 9\" 2x10     Gait Training                                         Modalities              CP 8' #5 NP       10'                                "

## 2024-10-02 ENCOUNTER — OFFICE VISIT (OUTPATIENT)
Dept: PHYSICAL THERAPY | Facility: REHABILITATION | Age: 74
End: 2024-10-02
Payer: MEDICARE

## 2024-10-02 DIAGNOSIS — M17.12 PRIMARY OSTEOARTHRITIS OF LEFT KNEE: ICD-10-CM

## 2024-10-02 DIAGNOSIS — Z96.652 STATUS POST TOTAL KNEE REPLACEMENT, LEFT: Primary | ICD-10-CM

## 2024-10-02 PROCEDURE — 97110 THERAPEUTIC EXERCISES: CPT

## 2024-10-02 PROCEDURE — 97140 MANUAL THERAPY 1/> REGIONS: CPT

## 2024-10-02 NOTE — PROGRESS NOTES
"Daily Note     Today's date: 10/2/2024  Patient name: Eddie Perez Jr.  : 1950  MRN: [id number]  Referring provider: Edwin Goodrich PA*  Dx:   Encounter Diagnosis     ICD-10-CM    1. Status post total knee replacement, left  Z96.652       2. Primary osteoarthritis of left knee  M17.12           Start Time: 1130  Stop Time: 1215  Total time in clinic (min): 45 minutes      Subjective: Patient is having a hard time with Celebrex and Tylenol PM. Pain is slowly improving, once in awhile he gets a spark pain.      Objective: See treatment diary below       Assessment: Patient tolerated treatment well. Patient continues to progress very well in soft tissue quality in regards to tissue restriction of the quad. Strength and mobility continues to improve weekly.  Patient will continue to be appropriate for skilled outpatient physical therapy in order to address impairments and pain s/p L TKA.       Plan: Continue per plan of care.        Precautions: GERD, Stage 3a CKD, Memory Difficulty, Prediabetes, HL (hearing loss)       POC expires Unit limit Auth Expiration date PT/OT + Visit Limit?   11/15/24 BOMN 24 BOMN              Pertinent Findings:      POC End Date: 11/15/24                                                                                          Test / Measure  24  Pre-Op 24  Post-Op 24   FOTO (Predicted ) At Post-Op NP due to wrong body part    Knee AROM -10 to WNL -25 to 100 P! -15 to 130   L Knee MMT /5 3/5 4/5   5xSTS 17.18\" 16.80\"  +1 foam  18.63\"   TUG  10.25\" No SPC  +1 foam   11.95\" 8.40\"       Access Code: [email or web address]  URL: [email or web address]  Date: 2024  Prepared by: Radha Huynh    Exercises  - Quadricep Stretch with Chair and Counter Support  - 1-3 x daily - 7 x weekly - 3-4 sets - 1 reps - 30 seconds hold  - Prone Knee Flexion Stretch with Strap  - 1-3 x daily - 7 x weekly - 3-4 sets - 1 reps - 30 seconds hold  - Standing Quad Stretch with " "Strap  - 1-3 x daily - 7 x weekly - 3-4 sets - 1 reps - 30 seconds hold  - Supine Quadriceps Stretch with Strap on Table  - 1-3 x daily - 7 x weekly - 3-4 sets - 1 reps - 30 second hold  - Gastroc Stretch on Wall  - 1-3 x daily - 7 x weekly - 3-4 sets - 1 reps - 30 second hold  - Gastroc Stretch with Foot at Wall  - 1-3 x daily - 7 x weekly - 3-4 sets - 1 reps - 30 seconds hold  - Standing Ankle Dorsiflexion Stretch on Chair  - 1-3 x daily - 7 x weekly - 3-4 sets - 1 reps - 30 seconds hold  - Seated Gastroc Stretch with Strap  - 1-3 x daily - 7 x weekly - 3-4 sets - 1 reps - 30 seconds hold        Manuals 8/22 8/27 8/29 9/5 9/10 9/12 9/17 9/19 9/24 9/26 10/2   L Knee PROM AR AR AR  AR AR  KR SA AR AR   STM along quad      AR  KR IASTM SA No instrument  AR No instrument  AR   L Tibiofemoral Posterior Mob AR AR AR       AR + flexion/extension AR +flexion/extension                   Neuro Re-Ed              Quadset 10\"x20 Bolster under ankle  5\"x20 Bolster under ankle  5\"x20           SLR No strap  2x10 #2  3x10 10x   2x10        Eccentric FWD Step-Downs 4\" step  10x   4\" step  10x 6\" step   20x         Bridge on Pball      3\"  2x10         SAQ with bolster     5\"x10 5\"x15        Ther Ex              HEP review / edu/ Patient education/expectations          Updated HEP  AR    NuStep or RB  Seat: 9  Arms: 10  RB 10' RB 10' RB 8' NP  RB 10'  Level 4 RB 8'   Lvl 2 RB 8'  Lvl 3-4 RB 8'  Lvl 3-4 RB 8'  Lvl 3-4 RB 8' Lvl 3 RB 10'   Lvl 4   LAQ 35x  # @ NV #3  3x10 #3  3x10  #5  2x10 #5  2x10 [place]  #5  3x10   #5  3x10 #7  3x10   HS Curl NV OTB  2x10  OTB  2x10    Seated  #7  2x10       Calf Stretch          At wall  30\"x4    Lunge at wall  30\"x3    Heel Raises/Toe Raises 6\" step  3x10   4\" step  3x10       Leg Press  #25  30x   STS Green therpad on  RLE  2x8 NP 3x8 #4  3x8 #8  3x6 3x10  Defer # 3x10 3x10       Leg Press NV #40  3x10 #40  3x10 #55  4x8 #70  3x10 #55  3x10 #70  3x10    SL  #25  10x #70  3x10    SL  #25  10x  " "#85  3x8,8,10    SL:  #25  2x10 #85  30x    SL:  #25  3x10   HR on Leg Press              Standing Hip Abduction   20x B/L  30x B/L 3x10  B/L 3x10   B/L 3x10 B/L      Pro-Stretch           10\"x10                 Standing Hip Ext       3x10 B/L 3x10 B/L       Side-Stepping     BTB  2 laps  BTB  2 laps BTB  3 laps BTB 3 laps                     Ther Activity               FWD Step-Ups 6\"   15x L  6\" step  20x L     9\"   2x10   9\" step  #8   2x10  LLE 9\" 2x10       LAT Step-Ups 9\"  15x L  9\" step  20x L   9\" step  2x10  eccentric Eccentric  9\"  2x10  9\" step  #5 b/l  2x10   LLE 9\" 2x10      Gait Training                                            Modalities               CP 8' #5 NP       10'                                  "

## 2024-10-10 ENCOUNTER — OFFICE VISIT (OUTPATIENT)
Dept: PHYSICAL THERAPY | Facility: REHABILITATION | Age: 74
End: 2024-10-10
Payer: MEDICARE

## 2024-10-10 DIAGNOSIS — Z96.652 STATUS POST TOTAL KNEE REPLACEMENT, LEFT: Primary | ICD-10-CM

## 2024-10-10 DIAGNOSIS — M17.12 PRIMARY OSTEOARTHRITIS OF LEFT KNEE: ICD-10-CM

## 2024-10-10 PROCEDURE — 97110 THERAPEUTIC EXERCISES: CPT

## 2024-10-10 PROCEDURE — 97140 MANUAL THERAPY 1/> REGIONS: CPT

## 2024-10-10 NOTE — PROGRESS NOTES
"Daily Note     Today's date: 10/10/2024  Patient name: Eddie Perez Jr.  : 1950  MRN: [id number]  Referring provider: Edwin Goodrich PA*  Dx:   Encounter Diagnosis     ICD-10-CM    1. Status post total knee replacement, left  Z96.652       2. Primary osteoarthritis of left knee  M17.12           Start Time: 1320  Stop Time: 1400  Total time in clinic (min): 40 minutes      Subjective: Patient overall is doing okay.       Objective: See treatment diary below       Assessment: Patient tolerated treatment well. Strength and mobility continues to improve weekly.  Patient will continue to be appropriate for skilled outpatient physical therapy in order to address impairments and pain s/p L TKA.       Plan: Continue per plan of care.        Precautions: GERD, Stage 3a CKD, Memory Difficulty, Prediabetes, HL (hearing loss)       POC expires Unit limit Auth Expiration date PT/OT + Visit Limit?   11/15/24 BOMN 24 BOMN              Pertinent Findings:      POC End Date: 11/15/24                                                                                          Test / Measure  24  Pre-Op 24  Post-Op 24   FOTO (Predicted ) At Post-Op NP due to wrong body part    Knee AROM -10 to WNL -25 to 100 P! -15 to 130   L Knee MMT 5/5 3/5 4/5   5xSTS 17.18\" 16.80\"  +1 foam  18.63\"   TUG  10.25\" No SPC  +1 foam   11.95\" 8.40\"       Access Code: [email or web address]  URL: [email or web address]  Date: 2024  Prepared by: Radha Huynh    Exercises  - Quadricep Stretch with Chair and Counter Support  - 1-3 x daily - 7 x weekly - 3-4 sets - 1 reps - 30 seconds hold  - Prone Knee Flexion Stretch with Strap  - 1-3 x daily - 7 x weekly - 3-4 sets - 1 reps - 30 seconds hold  - Standing Quad Stretch with Strap  - 1-3 x daily - 7 x weekly - 3-4 sets - 1 reps - 30 seconds hold  - Supine Quadriceps Stretch with Strap on Table  - 1-3 x daily - 7 x weekly - 3-4 sets - 1 reps - 30 second hold  - Gastroc " "Stretch on Wall  - 1-3 x daily - 7 x weekly - 3-4 sets - 1 reps - 30 second hold  - Gastroc Stretch with Foot at Wall  - 1-3 x daily - 7 x weekly - 3-4 sets - 1 reps - 30 seconds hold  - Standing Ankle Dorsiflexion Stretch on Chair  - 1-3 x daily - 7 x weekly - 3-4 sets - 1 reps - 30 seconds hold  - Seated Gastroc Stretch with Strap  - 1-3 x daily - 7 x weekly - 3-4 sets - 1 reps - 30 seconds hold        Manuals 9/10 9/12 9/17 9/19 9/24 9/26 10/2 10/10   L Knee PROM AR AR  KR SA AR AR AR   Hamstring Stretcg        AR   STM along quad  AR  KR IASTM SA No instrument  AR No instrument  AR    L Tibiofemoral Posterior Mob      AR + flexion/extension AR +flexion/extension                 Neuro Re-Ed           Quadset           SLR  2x10         Eccentric FWD Step-Downs 6\" step   20x          Bridge on Pball  3\"  2x10          SAQ with bolster 5\"x10 5\"x15         Ther Ex           HEP review / edu/ Patient education/expectations      Updated HEP  AR     NuStep or RB  Seat: 9  Arms: 10  RB 10'  Level 4 RB 8'   Lvl 2 RB 8'  Lvl 3-4 RB 8'  Lvl 3-4 RB 8'  Lvl 3-4 RB 8' Lvl 3 RB 10'   Lvl 4 RB 10'  Lvl 4   Hamstring Stretch        30\"x3   LAQ #5  2x10 #5  2x10 [place]  #5  3x10   #5  3x10 #7  3x10 #8  3x10   HS Curl   Seated  #7  2x10     Seated  BTB  3x10   Calf Stretch      At wall  30\"x4    Lunge at wall  30\"x3  Slant board  30\"x2   Assisted Mini Squats off slant board         2x5   Heel Raises/Toe Raises       Leg Press  #25  30x    STS #8  3x6 3x10  Defer # 3x10 3x10        Leg Press #70  3x10 #55  3x10 #70  3x10    SL  #25  10x #70  3x10    SL  #25  10x  #85  3x8,8,10    SL:  #25  2x10 #85  30x    SL:  #25  3x10 #100  4x8      SL:  #40  3x10   HR on Leg Press           Standing Hip Abduction 30x B/L 3x10  B/L 3x10   B/L 3x10 B/L       Pro-Stretch       10\"x10               Standing Hip Ext   3x10 B/L 3x10 B/L        Side-Stepping BTB  2 laps  BTB  2 laps BTB  3 laps BTB 3 laps                   Ther Activity            FWD " "Step-Ups   9\" step  #8   2x10  LLE 9\" 2x10        LAT Step-Ups Eccentric  9\"  2x10  9\" step  #5 b/l  2x10   LLE 9\" 2x10       Gait Training                                   Modalities            CP     10'                                "

## 2024-10-17 ENCOUNTER — OFFICE VISIT (OUTPATIENT)
Dept: PHYSICAL THERAPY | Facility: REHABILITATION | Age: 74
End: 2024-10-17
Payer: MEDICARE

## 2024-10-17 DIAGNOSIS — M17.12 PRIMARY OSTEOARTHRITIS OF LEFT KNEE: ICD-10-CM

## 2024-10-17 DIAGNOSIS — Z96.652 STATUS POST TOTAL KNEE REPLACEMENT, LEFT: Primary | ICD-10-CM

## 2024-10-17 PROCEDURE — 97140 MANUAL THERAPY 1/> REGIONS: CPT

## 2024-10-17 PROCEDURE — 97110 THERAPEUTIC EXERCISES: CPT

## 2024-10-17 NOTE — PROGRESS NOTES
"Daily Note     Today's date: 10/17/2024  Patient name: Eddie Perez Jr.  : 1950  MRN: [id number]  Referring provider: Edwin Goodrich PA*  Dx:   Encounter Diagnosis     ICD-10-CM    1. Status post total knee replacement, left  Z96.652       2. Primary osteoarthritis of left knee  M17.12           Start Time: 1306  Stop Time: 1346  Total time in clinic (min): 40 minutes    Subjective: Pt reports he is having a little bit more pain than the usual; and also noting that he got himself off the oxy and hasn't take anything in the past few days. Otherwise, no other chief complaints noted.       Objective: See treatment diary below      Assessment: Tolerated treatment well. Pt tolerated all ex's completed well today. Pt continues to have pain hazel with full extension passively. Pt able to achieve 130 degrees of L knee flexion; however, full terminal knee extension is still limited. Pt to follow up with surgeon office next week. Patient demonstrated fatigue post treatment, exhibited good technique with therapeutic exercises, and would benefit from continued PT.      Plan: Continue per plan of care.      Precautions: GERD, Stage 3a CKD, Memory Difficulty, Prediabetes, HL (hearing loss)       POC expires Unit limit Auth Expiration date PT/OT + Visit Limit?   11/15/24 BOMN 24 BOMN              Pertinent Findings:      POC End Date: 11/15/24                                                                                          Test / Measure  24  Pre-Op 24  Post-Op 24   FOTO (Predicted ) At Post-Op NP due to wrong body part    Knee AROM -10 to WNL -25 to 100 P! -15 to 130   L Knee MMT 5/5 3/5 4/5   5xSTS 17.18\" 16.80\"  +1 foam  18.63\"   TUG  10.25\" No SPC  +1 foam   11.95\" 8.40\"       Access Code: [email or web address]  URL: [email or web address]  Date: 2024  Prepared by: Radha Huynh    Exercises  - Quadricep Stretch with Chair and Counter Support  - 1-3 x daily - 7 x weekly - 3-4 " "sets - 1 reps - 30 seconds hold  - Prone Knee Flexion Stretch with Strap  - 1-3 x daily - 7 x weekly - 3-4 sets - 1 reps - 30 seconds hold  - Standing Quad Stretch with Strap  - 1-3 x daily - 7 x weekly - 3-4 sets - 1 reps - 30 seconds hold  - Supine Quadriceps Stretch with Strap on Table  - 1-3 x daily - 7 x weekly - 3-4 sets - 1 reps - 30 second hold  - Gastroc Stretch on Wall  - 1-3 x daily - 7 x weekly - 3-4 sets - 1 reps - 30 second hold  - Gastroc Stretch with Foot at Wall  - 1-3 x daily - 7 x weekly - 3-4 sets - 1 reps - 30 seconds hold  - Standing Ankle Dorsiflexion Stretch on Chair  - 1-3 x daily - 7 x weekly - 3-4 sets - 1 reps - 30 seconds hold  - Seated Gastroc Stretch with Strap  - 1-3 x daily - 7 x weekly - 3-4 sets - 1 reps - 30 seconds hold        Manuals 9/10 9/12 9/17 9/19 9/24 9/26 10/2 10/10 10/17   L Knee PROM AR AR  KR SA AR AR AR NA   Hamstring Stretcg        AR NA   STM along quad  AR  KR IASTM SA No instrument  AR No instrument  AR     L Tibiofemoral Posterior Mob      AR + flexion/extension AR +flexion/extension                   Neuro Re-Ed            Quadset            SLR  2x10          Eccentric FWD Step-Downs 6\" step   20x           Bridge on Pball  3\"  2x10           SAQ with bolster 5\"x10 5\"x15          Ther Ex            HEP review / edu/ Patient education/expectations      Updated HEP  AR      NuStep or RB  Seat: 9  Arms: 10  RB 10'  Level 4 RB 8'   Lvl 2 RB 8'  Lvl 3-4 RB 8'  Lvl 3-4 RB 8'  Lvl 3-4 RB 8' Lvl 3 RB 10'   Lvl 4 RB 10'  Lvl 4 RB 10' Lvl 4    Hamstring Stretch        30\"x3 30\"x3   LAQ #5  2x10 #5  2x10 Iglesia  #5  3x10   #5  3x10 #7  3x10 #8  3x10 8# 3x10    HS Curl   Seated  #7  2x10     Seated  BTB  3x10 Seated BTB 3x10    Calf Stretch      At wall  30\"x4    Lunge at wall  30\"x3  Slant board  30\"x2 Slant board 30\"x2   Assisted Mini Squats off slant board         2x5 2x5   Heel Raises/Toe Raises       Leg Press  #25  30x  Leg press 25# 3x10    STS #8  3x6 3x10  Defer " "# 3x10 3x10         Leg Press #70  3x10 #55  3x10 #70  3x10    SL  #25  10x #70  3x10    SL  #25  10x  #85  3x8,8,10    SL:  #25  2x10 #85  30x    SL:  #25  3x10 #100  4x8      SL:  #40  3x10 #100  4x8      SL:  40#   3x10   HR on Leg Press            Standing Hip Abduction 30x B/L 3x10  B/L 3x10   B/L 3x10 B/L        Pro-Stretch       10\"x10                 Standing Hip Ext   3x10 B/L 3x10 B/L         Side-Stepping BTB  2 laps  BTB  2 laps BTB  3 laps BTB 3 laps                     Ther Activity             FWD Step-Ups   9\" step  #8   2x10  LLE 9\" 2x10         LAT Step-Ups Eccentric  9\"  2x10  9\" step  #5 b/l  2x10   LLE 9\" 2x10        Gait Training                                      Modalities             CP     10'                                    "

## 2024-10-24 ENCOUNTER — OFFICE VISIT (OUTPATIENT)
Dept: PHYSICAL THERAPY | Facility: REHABILITATION | Age: 74
End: 2024-10-24
Payer: MEDICARE

## 2024-10-24 DIAGNOSIS — Z96.652 STATUS POST TOTAL KNEE REPLACEMENT, LEFT: Primary | ICD-10-CM

## 2024-10-24 PROCEDURE — 97110 THERAPEUTIC EXERCISES: CPT

## 2024-10-24 PROCEDURE — 97164 PT RE-EVAL EST PLAN CARE: CPT

## 2024-10-24 NOTE — PROGRESS NOTES
PT Discharge    Today's date: 10/24/2024  Patient name: Eddie Perez Jr.  : 1950  MRN: [id number]  Referring provider: Edwin Goodrich PA*  Dx:   Encounter Diagnosis     ICD-10-CM    1. Status post total knee replacement, left  Z96.652             Start Time: 1310  Stop Time: 1348  Total time in clinic (min): 38 minutes    Assessment  Impairments: abnormal or restricted ROM    Assessment details: Eddie Perez Jr. is a 73 y.o. male who presents s/p 11 weeks LEFT [facility]. Upon clinical post-op testing, patient demonstrated WFL functional capacity and mobility per TUG, MMT, and passive range of motion testing. Patient has appropriate ROM to withstand all tasks required to perform as well as personal goals. Due to patient meeting all functional goals, patient will be appropriate for discharge at this time. If patient notices functional regression or increase in pain, he may return to physical therapy at any time, pt verbally understanding.  Barriers to intervention: medical complexity  Understanding of Dx/Px/POC: good     Prognosis: good    Goals  Short Term Goals:  In 4-6 weeks, the patient will:  1. Pt will report at least a 25% reduction in subjective pain complaints/symptoms to better manage ADLs and household chores. MET  2. Pt will have improve atleast half a grade per LE MMT MET  3. Pt independent with initial HEP, rationale, technique and frequency, for ROM and pain control. MET  4. Pt will have gain 50% improvement in knee AROM MET  5. Pt will be ambulating with least restrictive AD for household distances MET        Long Term Goals:  In 12 weeks, the patient will:  1. Pt will have atleast 4/5 graded MMT and WNL knee AROM expected post-operatively MET  2. FOTO to greater than predicted value. MET  3. Independent with comprehensive HEP upon discharge. MET  4. Pt will be able to perform ADLs, iADLS, and household duties with  minimal restriction indicating return to PLOF. MET  5. Pt independent with rationale, technique and plan for performance of advanced HEP to ensure independent self-management of symptoms upon discharge. MET      Plan  Patient would benefit from: skilled physical therapy and PT eval  Referral necessary: No    Planned therapy interventions: home exercise program    Treatment plan discussed with: patient  Plan details: Discharged        Subjective Evaluation    History of Present Illness  Date of surgery: 2024  Mechanism of injury: surgery  Mechanism of injury: IE 10/24: Comes in with no complaints or concerns.       RE : Patient presents s/p 1 month LEFT TKA underwent on 24 performed at [facility] via Guillermo Jacobo MD. Patient notes improvement since participating in skilled PT. He continues to report high levels of pain that requires him to continue to utilize opioids as needed. Patient continues to report gnawing deep pain even after performing exericses/WB tasks. Sleeping hasn't been well due to the pain. He expresses his frustration of how slow the healing timeline has been.            Not a recurrent problem   Quality of life: good    Patient Goals  Patient goals for therapy: decreased pain, improved balance, increased motion, return to sport/leisure activities, independence with ADLs/IADLs, increased strength and decreased edema    Pain  Current pain ratin  At best pain ratin  At worst pain ratin  Location: L Knee Pain  Quality: dull ache, discomfort, sharp and squeezing  Relieving factors: relaxation, rest and support  Aggravating factors: stair climbing, walking and lifting    Social Support  Steps to enter house: yes  3  Lives in: one-story house  Lives with: spouse    Employment status: not working    Diagnostic Tests  No diagnostic tests performed  Treatments  Previous treatment: medication and injection treatment  Current treatment: physical therapy        Objective      Pertinent  "Findings:      POC End Date: 11/15/24                                                                                          Test / Measure  7/11/24  Pre-Op 8/9/24  Post-Op 9/5/24 10/24/24   FOTO (Predicted ) At Post-Op NP due to wrong body part  DC   Knee AROM -10 to WNL -25 to 100 P! -15 to 130 -8 to 135   L Knee MMT 5/5 3/5 4/5 5/5   5xSTS 17.18\" 16.80\"  +1 foam  18.63\" 11.71\"   TUG  10.25\" No SPC  +1 foam   11.95\" 8.40\" 7.15\"             Precautions: GERD, Stage 3a CKD, Memory Difficulty, Prediabetes, HL (hearing loss)     POC expires Unit limit Auth Expiration date PT/OT + Visit Limit?   11/15/24 BOMN 12/31/24 BOMN                    Access Code: [email or web address]  URL: [email or web address]  Date: 07/11/2024  Prepared by: Radha Huynh    Exercises  - Supine Gluteal Sets  - 1 x daily - 7 x weekly - 2-3 sets - 10 reps - 10 seconds hold  - Supine Quad Set  - 3-4 x daily - 7 x weekly - 1-2 sets - 10 reps - 5-10 second hold  - Supine Heel Slide with Strap  - 3-4 x daily - 7 x weekly - 3 sets - 10 reps  - Active Straight Leg Raise with Quad Set  - 1-2 x daily - 7 x weekly - 3 sets - 10 reps  - Supine Bridge  - 1 x daily - 7 x weekly - 3 sets - 10 reps  - Seated Long Arc Quad  - 1 x daily - 7 x weekly - 3 sets - 10 reps  - Seated Ankle Pumps on Table  - 1 x daily - 7 x weekly - 3 sets - 10 reps  - Heel Raises with Counter Support  - 1 x daily - 7 x weekly - 3 sets - 10 reps              Manuals 9/10 9/12 9/17 9/19 9/24 9/26 10/2 10/10 10/17 10/24   L Knee PROM AR AR  KR SA AR AR AR NA    Hamstring Stretcg        AR NA    STM along quad  AR  KR IASTM SA No instrument  AR No instrument  AR      L Tibiofemoral Posterior Mob      AR + flexion/extension AR +flexion/extension                     Neuro Re-Ed             Quadset             SLR  2x10           Eccentric FWD Step-Downs 6\" step   20x            Bridge on Pball  3\"  2x10            SAQ with florenciaster 5\"x10 5\"x15           Ther Ex             HEP review / " "edu/ Patient education/expectations      Updated HEP  AR    Reviewed  AR   NuStep or RB  Seat: 9  Arms: 10  RB 10'  Level 4 RB 8'   Lvl 2 RB 8'  Lvl 3-4 RB 8'  Lvl 3-4 RB 8'  Lvl 3-4 RB 8' Lvl 3 RB 10'   Lvl 4 RB 10'  Lvl 4 RB 10' Lvl 4  RB 8'  Level 4   Hamstring Stretch        30\"x3 30\"x3    LAQ #5  2x10 #5  2x10 [place]  #5  3x10   #5  3x10 #7  3x10 #8  3x10 8# 3x10     HS Curl   Seated  #7  2x10     Seated  BTB  3x10 Seated BTB 3x10     Calf Stretch      At wall  30\"x4    Lunge at wall  30\"x3  Slant board  30\"x2 Slant board 30\"x2    Assisted Mini Squats off slant board         2x5 2x5    Heel Raises/Toe Raises       Leg Press  #25  30x  Leg press 25# 3x10     STS #8  3x6 3x10  Defer # 3x10 3x10          Leg Press #70  3x10 #55  3x10 #70  3x10    SL  #25  10x #70  3x10    SL  #25  10x  #85  3x8,8,10    SL:  #25  2x10 #85  30x    SL:  #25  3x10 #100  4x8      SL:  #40  3x10 #100  4x8      SL:  40#   3x10 #130  3x6  3x6   HR on Leg Press             Standing Hip Abduction 30x B/L 3x10  B/L 3x10   B/L 3x10 B/L         Pro-Stretch       10\"x10                   Standing Hip Ext   3x10 B/L 3x10 B/L          Side-Stepping BTB  2 laps  BTB  2 laps BTB  3 laps BTB 3 laps                       Ther Activity              FWD Step-Ups   9\" step  #8   2x10  LLE 9\" 2x10          LAT Step-Ups Eccentric  9\"  2x10  9\" step  #5 b/l  2x10   LLE 9\" 2x10         Gait Training                                         Modalities              CP     10'                                 "

## 2024-11-04 DIAGNOSIS — K21.9 GASTROESOPHAGEAL REFLUX DISEASE, UNSPECIFIED WHETHER ESOPHAGITIS PRESENT: ICD-10-CM

## 2024-11-05 RX ORDER — FAMOTIDINE 20 MG/1
TABLET, FILM COATED ORAL
Qty: 180 TABLET | Refills: 1 | Status: SHIPPED | OUTPATIENT
Start: 2024-11-05

## 2024-12-30 ENCOUNTER — APPOINTMENT (EMERGENCY)
Dept: RADIOLOGY | Facility: HOSPITAL | Age: 74
End: 2024-12-30
Payer: MEDICARE

## 2024-12-30 ENCOUNTER — HOSPITAL ENCOUNTER (EMERGENCY)
Facility: HOSPITAL | Age: 74
Discharge: HOME | End: 2024-12-30
Attending: EMERGENCY MEDICINE
Payer: MEDICARE

## 2024-12-30 VITALS
SYSTOLIC BLOOD PRESSURE: 161 MMHG | RESPIRATION RATE: 18 BRPM | DIASTOLIC BLOOD PRESSURE: 91 MMHG | HEART RATE: 86 BPM | TEMPERATURE: 97.6 F | OXYGEN SATURATION: 95 %
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
## 2024-12-30 DIAGNOSIS — S22.41XA CLOSED FRACTURE OF MULTIPLE RIBS OF RIGHT SIDE, INITIAL ENCOUNTER: ICD-10-CM

## 2024-12-30 DIAGNOSIS — S09.90XA CLOSED HEAD INJURY, INITIAL ENCOUNTER: ICD-10-CM

## 2024-12-30 LAB
ANION GAP SERPL CALCULATED.3IONS-SCNC: 9 MMOL/L (ref 4–13)
BUN SERPL-MCNC: 24 MG/DL (ref 5–25)
CALCIUM SERPL-MCNC: 10.1 MG/DL (ref 8.4–10.2)
CHLORIDE SERPL-SCNC: 104 MMOL/L (ref 96–108)
CO2 SERPL-SCNC: 28 MMOL/L (ref 21–32)
CREAT SERPL-MCNC: 1.36 MG/DL (ref 0.6–1.3)
ERYTHROCYTE [DISTWIDTH] IN BLOOD BY AUTOMATED COUNT: 14.2 % (ref 11.6–15.1)
GFR SERPL CREATININE-BSD FRML MDRD: 50 ML/MIN/1.73SQ M
GLUCOSE SERPL-MCNC: 101 MG/DL (ref 65–140)
HCT VFR BLD AUTO: 44.6 % (ref 36.5–49.3)
HGB BLD-MCNC: 14.6 G/DL (ref 12–17)
MCH RBC QN AUTO: 29.5 PG (ref 26.8–34.3)
MCHC RBC AUTO-ENTMCNC: 32.7 G/DL (ref 31.4–37.4)
MCV RBC AUTO: 90 FL (ref 82–98)
PLATELET # BLD AUTO: 264 THOUSANDS/UL (ref 149–390)
PMV BLD AUTO: 11.1 FL (ref 8.9–12.7)
POTASSIUM SERPL-SCNC: 3.6 MMOL/L (ref 3.5–5.3)
RBC # BLD AUTO: 4.95 MILLION/UL (ref 3.88–5.62)
SODIUM SERPL-SCNC: 141 MMOL/L (ref 135–147)
WBC # BLD AUTO: 16.44 THOUSAND/UL (ref 4.31–10.16)

## 2024-12-30 PROCEDURE — 73502 X-RAY EXAM HIP UNI 2-3 VIEWS: CPT

## 2024-12-30 PROCEDURE — 80048 BASIC METABOLIC PNL TOTAL CA: CPT

## 2024-12-30 PROCEDURE — 99285 EMERGENCY DEPT VISIT HI MDM: CPT | Performed by: EMERGENCY MEDICINE

## 2024-12-30 PROCEDURE — 36415 COLL VENOUS BLD VENIPUNCTURE: CPT

## 2024-12-30 PROCEDURE — 85027 COMPLETE CBC AUTOMATED: CPT

## 2024-12-30 PROCEDURE — 74177 CT ABD & PELVIS W/CONTRAST: CPT

## 2024-12-30 PROCEDURE — 70450 CT HEAD/BRAIN W/O DYE: CPT

## 2024-12-30 PROCEDURE — 72125 CT NECK SPINE W/O DYE: CPT

## 2024-12-30 PROCEDURE — 99284 EMERGENCY DEPT VISIT MOD MDM: CPT

## 2024-12-30 PROCEDURE — 73030 X-RAY EXAM OF SHOULDER: CPT

## 2024-12-30 PROCEDURE — 71260 CT THORAX DX C+: CPT

## 2024-12-30 RX ORDER — HYDROMORPHONE HCL IN WATER/PF 6 MG/30 ML
0.2 PATIENT CONTROLLED ANALGESIA SYRINGE INTRAVENOUS ONCE
Status: DISCONTINUED | OUTPATIENT
Start: 2024-12-30 | End: 2024-12-30 | Stop reason: HOSPADM

## 2024-12-30 RX ORDER — ACETAMINOPHEN 325 MG/1
975 TABLET ORAL ONCE
Status: COMPLETED | OUTPATIENT
Start: 2024-12-30 | End: 2024-12-30

## 2024-12-30 RX ADMIN — IOHEXOL 100 ML: 350 INJECTION, SOLUTION INTRAVENOUS at 18:57

## 2024-12-30 RX ADMIN — ACETAMINOPHEN 975 MG: 325 TABLET, FILM COATED ORAL at 18:06

## 2024-12-30 NOTE — ED NOTES
Pt stated having a worsening headache and pain while breathing. Vitals reassessed.       Kary Cook  12/30/24 [id number]

## 2024-12-30 NOTE — ED PROVIDER NOTES
Time reflects when diagnosis was documented in both MDM as applicable and the Disposition within this note       Time User Action Codes Description Comment    12/30/2024  9:15 PM Eduardo Triana [W19.XXXA] Fall, initial encounter     12/30/2024  9:15 PM Eduardo Triana [S09.90XA] Closed head injury, initial encounter     12/30/2024  9:16 PM Eduardo Triana [S22.41XA] Closed fracture of multiple ribs of right side, initial encounter           ED Disposition       ED Disposition   Discharge    Condition   Stable    Date/Time   Mon Dec 30, 2024  9:15 PM    Comment   Eddie Perez Jr. discharge to home/self care.                   Assessment & Plan       Medical Decision Making  74-year-old male with fall down 12 steps now with right sided chest wall pain worsened by breathing.  Patient mildly hypertensive with otherwise normal vital signs and presentation.  Patient is well-appearing on any acute distress.  PERRLA, EOMI.  Neck is supple and without midline spinal tenderness and normal range of motion without pain.  Head is atraumatic.  There is right sided clavicular tenderness.  There is right femoral greater trochanter tenderness.  There is right anterolateral rib tenderness.  Lungs are clear.  Abdomen is benign.  No lacerations or bruising noted.  No midline spinal tenderness.  Full range of motion of upper and lower extremities.  Gait normal.  Concern for traumatic injuries to the head, C-spine, chest, abdomen, pelvis given mechanism of injury.  I will get CT head, C-spine, chest abdomen pelvis with contrast.    Amount and/or Complexity of Data Reviewed  Labs: ordered.  Radiology: ordered and independent interpretation performed.    Risk  OTC drugs.  Prescription drug management.        ED Course as of 01/01/25 1932   Mon Dec 30, 2024   2128 I discussed workup with patient.  His pain is overall manageable at this time and he was able to max out the incentive spirometer without issue.  I  discussed plan for continued supportive treatment at home, incentive spirometry, trauma clinic follow-up as needed. Patient voiced understanding of the plan and all questions were answered. Strict return precautions given. Patient is hemodynamically stable and safe for discharge at this time.       Medications   acetaminophen (TYLENOL) tablet 975 mg (975 mg Oral Given 12/30/24 1806)   iohexol (OMNIPAQUE) 350 MG/ML injection (MULTI-DOSE) 100 mL (100 mL Intravenous Given 12/30/24 1857)       ED Risk Strat Scores                                              History of Present Illness       Chief Complaint   Patient presents with    Fall     Pt fell down 12 stairs +h/s, pt also c/o R sided ribcage+ hip pain. -thinners, -LOC       Past Medical History:   Diagnosis Date    Allergic rhinitis     BPH (benign prostatic hyperplasia)     GERD (gastroesophageal reflux disease)     HL (hearing loss)     Wears hearing aids    Hyperlipidemia     Inguinal hernia of left side with obstruction     Nephrolithiasis     Tinnitus       Past Surgical History:   Procedure Laterality Date    ADENOIDECTOMY      INGUINAL HERNIA REPAIR      MD RPR 1ST INGUN HRNA AGE 5 YRS/> REDUCIBLE Left 10/14/2021    Procedure: REPAIR HERNIA INGUINAL;  Surgeon: Juan Pereira MD;  Location: [facility];  Service: General    TONSILLECTOMY      WISDOM TOOTH EXTRACTION        Family History   Problem Relation Age of Onset    Skin cancer Father       Social History     Tobacco Use    Smoking status: Never     Passive exposure: Never    Smokeless tobacco: Never   Vaping Use    Vaping status: Never Used   Substance Use Topics    Alcohol use: No    Drug use: No      E-Cigarette/Vaping    E-Cigarette Use Never User       E-Cigarette/Vaping Substances    Nicotine No     THC No     CBD No     Flavoring No     Other No     Unknown No       I have reviewed and agree with the history as documented.     HPI  74-year-old male with history of hyperlipidemia presents to the  "ED for evaluation of right-sided chest wall pain worsened by breathing, right hip pain, right shoulder pain after he fell down 12 steps while trying to bring a table downstairs.  He says he did hit his head but did not lose consciousness.  He was \"dazed\" for a few minutes but then was able to get himself up off the floor.  He is able to walk but does note the pain in his right hip when he does so.  He also endorses a mild global headache since the fall.  Denies lightheadedness, vision changes, neck pain, shortness of breath, abdominal pain, nausea, vomiting, hematuria.  Review of Systems  See HPI      Objective       ED Triage Vitals   Temperature Pulse Blood Pressure Respirations SpO2 Patient Position - Orthostatic VS   12/30/24 1550 12/30/24 1550 12/30/24 1550 12/30/24 1550 12/30/24 1550 12/30/24 1700   97.6 °F (36.4 °C) 92 161/95 20 98 % Sitting      Temp Source Heart Rate Source BP Location FiO2 (%) Pain Score    12/30/24 1550 12/30/24 1550 12/30/24 1550 -- 12/30/24 1700    Oral Monitor Left arm  5      Vitals      Date and Time Temp Pulse SpO2 Resp BP Pain Score FACES Pain Rating User   12/30/24 1957 -- 86 95 % 18 161/91 -- -- MALI   12/30/24 1700 -- 88 93 % 18 172/95 5 -- MI   12/30/24 1550 97.6 °F (36.4 °C) 92 98 % 20 161/95 -- -- JS            Physical Exam  Constitutional:       Appearance: Normal appearance.   HENT:      Head: Normocephalic and atraumatic.      Nose: Nose normal.      Mouth/Throat:      Mouth: Mucous membranes are moist.      Pharynx: Oropharynx is clear.   Eyes:      Extraocular Movements: Extraocular movements intact.      Conjunctiva/sclera: Conjunctivae normal.      Pupils: Pupils are equal, round, and reactive to light.   Cardiovascular:      Rate and Rhythm: Normal rate and regular rhythm.      Pulses: Normal pulses.      Heart sounds: Normal heart sounds.   Pulmonary:      Effort: Pulmonary effort is normal.      Breath sounds: Normal breath sounds.   Abdominal:      General: There is " no distension.      Palpations: Abdomen is soft.      Tenderness: There is no abdominal tenderness.   Musculoskeletal:         General: Tenderness (Right shoulder, right hip, right anterolateral chest wall) present.      Cervical back: Normal range of motion and neck supple. No tenderness.      Right lower leg: No edema.      Left lower leg: No edema.   Skin:     General: Skin is warm and dry.      Capillary Refill: Capillary refill takes less than 2 seconds.      Findings: No bruising.   Neurological:      General: No focal deficit present.      Mental Status: He is alert and oriented to person, place, and time.   Psychiatric:         Mood and Affect: Mood normal.         Behavior: Behavior normal.         Thought Content: Thought content normal.         Results Reviewed       Procedure Component Value Units Date/Time    Basic metabolic panel [[id number]]  (Abnormal) Collected: 12/30/24 1805    Lab Status: Final result Specimen: Blood from Arm, Left Updated: 12/30/24 1836     Sodium 141 mmol/L      Potassium 3.6 mmol/L      Chloride 104 mmol/L      CO2 28 mmol/L      ANION GAP 9 mmol/L      BUN 24 mg/dL      Creatinine 1.36 mg/dL      Glucose 101 mg/dL      Calcium 10.1 mg/dL      eGFR 50 ml/min/1.73sq m     Narrative:      National Kidney Disease Foundation guidelines for Chronic Kidney Disease (CKD):     Stage 1 with normal or high GFR (GFR > 90 mL/min/1.73 square meters)    Stage 2 Mild CKD (GFR = 60-89 mL/min/1.73 square meters)    Stage 3A Moderate CKD (GFR = 45-59 mL/min/1.73 square meters)    Stage 3B Moderate CKD (GFR = 30-44 mL/min/1.73 square meters)    Stage 4 Severe CKD (GFR = 15-29 mL/min/1.73 square meters)    Stage 5 End Stage CKD (GFR <15 mL/min/1.73 square meters)  Note: GFR calculation is accurate only with a steady state creatinine    CBC and Platelet [[id number]]  (Abnormal) Collected: 12/30/24 1805    Lab Status: Final result Specimen: Blood from Arm, Left Updated: 12/30/24 1817     WBC 16.44  Thousand/uL      RBC 4.95 Million/uL      Hemoglobin 14.6 g/dL      Hematocrit 44.6 %      MCV 90 fL      MCH 29.5 pg      MCHC 32.7 g/dL      RDW 14.2 %      Platelets 264 Thousands/uL      MPV 11.1 fL             CT head wo contrast   Final Interpretation by Aung Rowe MD (12/30 2009)      No acute intracranial abnormality.                  Workstation performed: [id number]         CT spine cervical without contrast   Final Interpretation by Aung Rowe MD (12/30 2013)      No cervical spine fracture or traumatic malalignment.                  Workstation performed: [id number]         CT chest abdomen pelvis w contrast   Final Interpretation by Aung Rowe MD (12/30 2044)      Acute nondisplaced fractures of the lateral right fifth and sixth ribs. A small focus of groundglass density is noted at the lateral right middle lobe, adjacent to the sixth rib fracture suspicious for a tiny pulmonary contusion.      No pneumothorax.      No evidence of solid organ injury.      Multiple small urinary bladder calculi measuring up to 11 mm.      The study was mildly degraded by patient motion and therefore an additional tiny nondisplaced rib fracture cannot be excluded.         Workstation performed: [id number]         XR shoulder 2+ views RIGHT   ED Interpretation by Eduardo Triana DO (12/30 1902)   No acute findings      Final Interpretation by Giovanni Ling MD (12/31 1040)      No acute osseous abnormality.      Mild acromioclavicular osteoarthritis.         Computerized Assisted Algorithm (CAA) may have been used to analyze all applicable images.         Resident: Aubrey Scott I, the attending radiologist, have reviewed the images and agree with the final report above.      Workstation performed: [id number]         XR hip/pelv 2-3 vws right if performed   ED Interpretation by Eduardo Triana DO (12/30 1902)   No acute findings      Final Interpretation by Giovanni Ling MD (12/31  1041)      No acute osseous abnormality.      Degenerative changes as described.         Computerized Assisted Algorithm (CAA) may have been used to analyze all applicable images.            Resident: Aubrey Scott I, the attending radiologist, have reviewed the images and agree with the final report above.      Workstation performed: [id number]             Procedures    ED Medication and Procedure Management   Prior to Admission Medications   Prescriptions Last Dose Informant Patient Reported? Taking?   Calcium Carb-Cholecalciferol 600-800 MG-UNIT TABS  Self Yes No   Sig: Take by mouth daily    Glucosamine-Chondroitin--400-375 MG TABS  Self Yes No   Sig: Take by mouth daily    azelastine (ASTELIN) 0.1 % nasal spray   Yes No   Si spray into each nostril 2 (two) times a day Use in each nostril as directed   celecoxib (CeleBREX) 200 mg capsule   Yes No   Sig: PLEASE SEE ATTACHED FOR DETAILED DIRECTIONS   ezetimibe (ZETIA) 10 mg tablet  Self No No   Sig: Take 1 tablet (10 mg total) by mouth daily   famotidine (PEPCID) 20 mg tablet   No No   Sig: TAKE 1 TABLET TWICE DAILY BEFORE MEALS   fenofibrate 160 MG tablet  Self No No   Sig: Take 1 tablet (160 mg total) by mouth daily   ipratropium (ATROVENT) 0.06 % nasal spray  Self No No   Sig: USE 2 SPRAYS IN EACH NOSTRIL TWICE DAILY   Patient not taking: Reported on 2024   multivitamin (THERAGRAN) TABS  Self Yes No   Sig: Take by mouth      Facility-Administered Medications: None     Discharge Medication List as of 2024  9:17 PM        CONTINUE these medications which have NOT CHANGED    Details   azelastine (ASTELIN) 0.1 % nasal spray 1 spray into each nostril 2 (two) times a day Use in each nostril as directed, Historical Med      Calcium Carb-Cholecalciferol 600-800 MG-UNIT TABS Take by mouth daily , Historical Med      celecoxib (CeleBREX) 200 mg capsule PLEASE SEE ATTACHED FOR DETAILED DIRECTIONS, Historical Med      ezetimibe (ZETIA) 10 mg  tablet Take 1 tablet (10 mg total) by mouth daily, Starting Tue 3/5/2024, Normal      famotidine (PEPCID) 20 mg tablet TAKE 1 TABLET TWICE DAILY BEFORE MEALS, Normal      fenofibrate 160 MG tablet Take 1 tablet (160 mg total) by mouth daily, Starting Tue 3/5/2024, Normal      Glucosamine-Chondroitin--400-375 MG TABS Take by mouth daily , Historical Med      ipratropium (ATROVENT) 0.06 % nasal spray USE 2 SPRAYS IN EACH NOSTRIL TWICE DAILY, Normal      multivitamin (THERAGRAN) TABS Take by mouth, Historical Med           No discharge procedures on file.  ED SEPSIS DOCUMENTATION   Time reflects when diagnosis was documented in both MDM as applicable and the Disposition within this note       Time User Action Codes Description Comment    12/30/2024  9:15 PM Eduardo Triana [W19.XXXA] Fall, initial encounter     12/30/2024  9:15 PM Eduardo Triana [S09.90XA] Closed head injury, initial encounter     12/30/2024  9:16 PM Eduardo Triana [S22.41XA] Closed fracture of multiple ribs of right side, initial encounter                  Eduardo Triana DO  01/01/25 1932

## 2024-12-31 NOTE — DISCHARGE INSTRUCTIONS
Please use the incentive spirometer at least 3 times in a row 3 times per hour while you are awake.  You can use Tylenol, Motrin, lidocaine patches, ice, heat as needed for pain.  You should schedule an appointment with the trauma clinic if your symptoms are not improving within a week.  You should return to the emergency room if you develop fevers, if you cannot catch your breath even at rest, or if you are having severe pain that is uncontrolled by home medication.  
yes

## 2024-12-31 NOTE — ED ATTENDING ATTESTATION
12/30/2024  I, Den Navarro MD, saw and evaluated the patient. I have discussed the patient with the resident/non-physician practitioner and agree with the resident's/non-physician practitioner's findings, Plan of Care, and MDM as documented in the resident's/non-physician practitioner's note, except where noted. All available labs and Radiology studies were reviewed.  I was present for key portions of any procedure(s) performed by the resident/non-physician practitioner and I was immediately available to provide assistance.       At this point I agree with the current assessment done in the Emergency Department.  I have conducted an independent evaluation of this patient a history and physical is as follows:    74-year-old male presenting after fall downstairs this afternoon.  Complaining of right chest pain, right shoulder pain, right hip pain.  Head atraumatic normocephalic.  No C, T, L-spine tenderness.  There is right chest wall tenderness.  No abdominal tenderness.  There is right hip tenderness.  Neurovascular intact.  Plan imaging, treatment of pain.    ED Course         Critical Care Time  Procedures

## 2025-01-20 DIAGNOSIS — E78.2 MIXED HYPERLIPIDEMIA: ICD-10-CM

## 2025-01-20 NOTE — TELEPHONE ENCOUNTER
Reason for call:   [x] Refill   [] Prior Auth  [] Other:     Office:   [x] PCP/Provider - [facility]   [] Specialty/Provider -     Medication:   ~ ezetimibe (ZETIA) 10 mg tablet - Take 1 tablet (10 mg total) by mouth daily   ~ fenofibrate 160 MG tablet - Take 1 tablet (160 mg total) by mouth daily     Pharmacy:   [facility] - [facility] [address]     Does the patient have enough for 3 days?   [x] Yes   [] No - Send as HP to POD

## 2025-01-21 RX ORDER — EZETIMIBE 10 MG/1
10 TABLET ORAL DAILY
Qty: 90 TABLET | Refills: 1 | Status: SHIPPED | OUTPATIENT
Start: 2025-01-21

## 2025-01-21 RX ORDER — FENOFIBRATE 160 MG/1
160 TABLET ORAL DAILY
Qty: 90 TABLET | Refills: 1 | Status: SHIPPED | OUTPATIENT
Start: 2025-01-21

## 2025-03-04 ENCOUNTER — OFFICE VISIT (OUTPATIENT)
Dept: OBGYN CLINIC | Facility: CLINIC | Age: 75
End: 2025-03-04
Payer: MEDICARE

## 2025-03-04 ENCOUNTER — APPOINTMENT (OUTPATIENT)
Dept: RADIOLOGY | Age: 75
End: 2025-03-04
Payer: MEDICARE

## 2025-03-04 VITALS — HEIGHT: 66 IN | BODY MASS INDEX: 30.22 KG/M2 | WEIGHT: 188 LBS

## 2025-03-04 DIAGNOSIS — M25.572 LEFT ANKLE PAIN, UNSPECIFIED CHRONICITY: ICD-10-CM

## 2025-03-04 DIAGNOSIS — M76.822 POSTERIOR TIBIAL TENDON DYSFUNCTION (PTTD) OF LEFT LOWER EXTREMITY: Primary | ICD-10-CM

## 2025-03-04 PROCEDURE — 73610 X-RAY EXAM OF ANKLE: CPT

## 2025-03-04 PROCEDURE — 99214 OFFICE O/P EST MOD 30 MIN: CPT | Performed by: ORTHOPAEDIC SURGERY

## 2025-03-04 NOTE — PROGRESS NOTES
CHIEF COMPLAINT/REASON FOR VISIT  Chief Complaint   Patient presents with    Left Ankle - Pain     No injury         HISTORY OF PRESENT ILLNESS  Eddie Perez Jr. is a 74 y.o. male who presents for evaluation of his left Ankle. Patient said he has been having left ankle pain for the past year. He denies any obvious injury or trauma. He describes the pain on the base of his foot which can radiate up his achilles. He said he tries to wear supportive shoes.    REVIEW OF SYSTEMS  Review of systems was performed and, outside that mentioned in the HPI, it was negative for symptomology related to the integumentary, hematologic, immunologic, allergic, neurologic, cardiovascular, respiratory, GI or  systems.    MEDICAL HISTORY  Patient Active Problem List   Diagnosis    Mixed hyperlipidemia    Prediabetes    Nephrolithiasis    BPH associated with nocturia    Elevated PSA    GERD (gastroesophageal reflux disease)    Groin pain, left    Non-healing skin lesion of nose    Status post left inguinal hernia repair    Chronic pain of left knee    Memory difficulty    Acute left ankle pain    Posterior tibial tendon dysfunction (PTTD) of left lower extremity    Stage 3a chronic kidney disease (HCC)       SURGICAL HISTORY  Past Surgical History:   Procedure Laterality Date    ADENOIDECTOMY      INGUINAL HERNIA REPAIR      [place] RPR 1ST INGUN HRNA AGE 5 YRS/> REDUCIBLE Left 10/14/2021    Procedure: REPAIR HERNIA INGUINAL;  Surgeon: Juan Pereira MD;  Location: [facility];  Service: General    TONSILLECTOMY      WISDOM TOOTH EXTRACTION         CURRENT MEDICATIONS    Current Outpatient Medications:     azelastine (ASTELIN) 0.1 % nasal spray, 1 spray into each nostril 2 (two) times a day Use in each nostril as directed, Disp: , Rfl:     Calcium Carb-Cholecalciferol 600-800 MG-UNIT TABS, Take by mouth daily , Disp: , Rfl:     ezetimibe (ZETIA) 10 mg tablet, Take 1 tablet (10 mg total) by mouth daily, Disp: 90 tablet, Rfl: 1     famotidine (PEPCID) 20 mg tablet, TAKE 1 TABLET TWICE DAILY BEFORE MEALS, Disp: 180 tablet, Rfl: 1    fenofibrate 160 MG tablet, Take 1 tablet (160 mg total) by mouth daily, Disp: 90 tablet, Rfl: 1    Glucosamine-Chondroitin--400-375 MG TABS, Take by mouth daily , Disp: , Rfl:     multivitamin (THERAGRAN) TABS, Take by mouth, Disp: , Rfl:     celecoxib (CeleBREX) 200 mg capsule, PLEASE SEE ATTACHED FOR DETAILED DIRECTIONS (Patient not taking: Reported on 3/4/2025), Disp: , Rfl:     ipratropium (ATROVENT) 0.06 % nasal spray, USE 2 SPRAYS IN EACH NOSTRIL TWICE DAILY (Patient not taking: Reported on 7/31/2024), Disp: 75 mL, Rfl: 3    SOCIAL HISTORY  Social History     Socioeconomic History    Marital status: /Civil Union     Spouse name: Not on file    Number of children: Not on file    Years of education: Not on file    Highest education level: Not on file   Occupational History    Not on file   Tobacco Use    Smoking status: Never     Passive exposure: Never    Smokeless tobacco: Never   Vaping Use    Vaping status: Never Used   Substance and Sexual Activity    Alcohol use: No    Drug use: No    Sexual activity: Not Currently   Other Topics Concern    Not on file   Social History Narrative    Not on file     Social Drivers of Health     Financial Resource Strain: Low Risk  (8/5/2024)    Received from [facility]    Overall Financial Resource Strain (CARDIA)     Difficulty of Paying Living Expenses: Not hard at all   Food Insecurity: No Food Insecurity (8/5/2024)    Received from [facility]    Hunger Vital Sign     Worried About Running Out of Food in the Last Year: Never true     Ran Out of Food in the Last Year: Never true   Transportation Needs: No Transportation Needs (8/5/2024)    Received from [facility]    PRAPARE - Transportation     Lack of Transportation (Medical): No     Lack of Transportation (Non-Medical): No   Physical Activity: Not on  "file   Stress: Not on file   Social Connections: Not on file   Intimate Partner Violence: Not At Risk (8/5/2024)    [facility]    Humiliation, Afraid, Rape, and Kick questionnaire     Fear of Current or Ex-Partner: No     Emotionally Abused: No     Physically Abused: No     Sexually Abused: No   Housing Stability: Low Risk  (8/5/2024)    Received from [facility]    Housing Stability Vital Sign     Unable to Pay for Housing in the Last Year: No     Number of Times Moved in the Last Year: 0     Homeless in the Last Year: No       Objective     VITAL SIGNS  Ht 5' 6\" (1.676 m) Comment: Verbal  Wt 85.3 kg (188 lb)   BMI 30.34 kg/m²        PHYSICAL EXAMINATION    Musculoskeletal: Left Ankle   Skin Intact               Swelling/ecchymosis over nothing              TTP: posterior tibial tendon   Range of motion and strength intact   Sensation intact throughout Superficial peroneal, Deep peroneal, Tibial, Sural, Saphenous distributions              EHL/TA/PF motor function intact to testing.               BCR              Gait: Normal      RADIOGRAPHIC EXAMINATION/DIAGNOSTICS:  Independent interpretation of the left ankle x-rays demonstrates no obvious acute osseous abnormalities    Assessment & Plan  Left ankle pain, unspecified    Orders:    XR ankle 3+ vw left; Future    Posterior tibial tendon dysfunction (PTTD) of left lower extremity    Orders:    Ambulatory Referral to Orthopedic Surgery; Future         Discussion had about conservative measures including RICE, proper shoe wear, and inserts to help with his symptoms  Referral placed to Dr. Lachman to discuss potential surgical options  Patient is understanding and agreeable to the above plan  He is to call with any other questions or concerns      Scribe Attestation      I,:  Hung Poe PA-C am acting as a scribe while in the presence of the attending physician.:       I,:  " Eduin Rodriguez MD personally performed the services described in this documentation    as scribed in my presence.:

## 2025-03-11 ENCOUNTER — OFFICE VISIT (OUTPATIENT)
Dept: FAMILY MEDICINE CLINIC | Facility: CLINIC | Age: 75
End: 2025-03-11
Payer: MEDICARE

## 2025-03-11 VITALS
DIASTOLIC BLOOD PRESSURE: 82 MMHG | RESPIRATION RATE: 18 BRPM | HEART RATE: 104 BPM | BODY MASS INDEX: 30.25 KG/M2 | WEIGHT: 188.2 LBS | SYSTOLIC BLOOD PRESSURE: 130 MMHG | TEMPERATURE: 98.5 F | OXYGEN SATURATION: 96 % | HEIGHT: 66 IN

## 2025-03-11 DIAGNOSIS — Z12.12 SCREENING FOR COLORECTAL CANCER: ICD-10-CM

## 2025-03-11 DIAGNOSIS — R09.82 PND (POST-NASAL DRIP): ICD-10-CM

## 2025-03-11 DIAGNOSIS — K21.9 GASTROESOPHAGEAL REFLUX DISEASE, UNSPECIFIED WHETHER ESOPHAGITIS PRESENT: ICD-10-CM

## 2025-03-11 DIAGNOSIS — L29.9 ITCHY SCALP: ICD-10-CM

## 2025-03-11 DIAGNOSIS — Z12.11 SCREENING FOR COLORECTAL CANCER: ICD-10-CM

## 2025-03-11 DIAGNOSIS — R53.83 OTHER FATIGUE: Primary | ICD-10-CM

## 2025-03-11 DIAGNOSIS — R97.20 ELEVATED PSA: ICD-10-CM

## 2025-03-11 DIAGNOSIS — R73.03 PREDIABETES: ICD-10-CM

## 2025-03-11 DIAGNOSIS — E78.2 MIXED HYPERLIPIDEMIA: ICD-10-CM

## 2025-03-11 PROBLEM — N18.31 STAGE 3A CHRONIC KIDNEY DISEASE (HCC): Status: RESOLVED | Noted: 2024-03-05 | Resolved: 2025-03-11

## 2025-03-11 PROCEDURE — G2211 COMPLEX E/M VISIT ADD ON: HCPCS | Performed by: FAMILY MEDICINE

## 2025-03-11 PROCEDURE — 99214 OFFICE O/P EST MOD 30 MIN: CPT | Performed by: FAMILY MEDICINE

## 2025-03-11 RX ORDER — OMEPRAZOLE 40 MG/1
40 CAPSULE, DELAYED RELEASE ORAL DAILY
Qty: 30 CAPSULE | Refills: 2 | Status: SHIPPED | OUTPATIENT
Start: 2025-03-11

## 2025-03-11 RX ORDER — KETOCONAZOLE 20 MG/ML
1 SHAMPOO, SUSPENSION TOPICAL 2 TIMES WEEKLY
Qty: 120 ML | Refills: 0 | Status: SHIPPED | OUTPATIENT
Start: 2025-03-13

## 2025-03-11 RX ORDER — FENOFIBRATE 160 MG/1
160 TABLET ORAL DAILY
Qty: 90 TABLET | Refills: 3 | Status: SHIPPED | OUTPATIENT
Start: 2025-03-11

## 2025-03-11 RX ORDER — EZETIMIBE 10 MG/1
10 TABLET ORAL DAILY
Qty: 90 TABLET | Refills: 3 | Status: SHIPPED | OUTPATIENT
Start: 2025-03-11

## 2025-03-11 NOTE — PROGRESS NOTES
"Name: Eddie Perez Jr.      : 1950      MRN: [id number]  Encounter Provider: Bogdan Solis MD  Encounter Date: 3/11/2025   Encounter department: [facility]  :  Assessment & Plan  Other fatigue  Check labs.   Orders:    TSH, 3rd generation with Free T4 reflex; Future    CBC and differential; Future    PND (post-nasal drip)  Hx of surgery but no help. Refer to ENT.   Orders:    Ambulatory Referral to Otolaryngology; Future    Itchy scalp    Orders:    ketoconazole (NIZORAL) 2 % shampoo; Apply 1 Application topically 2 (two) times a week    Gastroesophageal reflux disease, unspecified whether esophagitis present  Pepcid 20mg bid did not help. Give prilosec 40mg QD. Refer to GI.   Orders:    Ambulatory Referral to Gastroenterology; Future    omeprazole (PriLOSEC) 40 MG capsule; Take 1 capsule (40 mg total) by mouth daily    Elevated PSA  Advised pt to check PSA and follow urology.        Mixed hyperlipidemia  Low fat diet. Continue zetia and fenofibrate.   Orders:    Comprehensive metabolic panel; Future    Lipid panel; Future    Hemoglobin A1C; Future    CBC and differential; Future    ezetimibe (ZETIA) 10 mg tablet; Take 1 tablet (10 mg total) by mouth daily    fenofibrate 160 MG tablet; Take 1 tablet (160 mg total) by mouth daily    Prediabetes  Low carb diet.  Orders:    Comprehensive metabolic panel; Future    Lipid panel; Future    Hemoglobin A1C; Future    CBC and differential; Future    Screening for colorectal cancer    Orders:    Ambulatory Referral to Gastroenterology; Future      Refused immunizations.   Due for colonoscopy. DW GI.   RTO in 1 year per pt request.      History of Present Illness   HPI    Pt is here with his wife.   Refused AWV because \"those are personal life questions\" per pt.     Had Left knee replacement 2024. Still has pain. FU orthopedics.     Gained Weight, felt tired easily.  No TSH recently.     Running nose---constant, had surgery before but no " "help. Saw ENT.   Tried atrovent and astelin nasal spray but no help.     Itchy skin/itchy scalp---no rash.     GERD---He is on Famotidine 20 mg twice, still feels symptoms.     ED---would like to take daily medications. Saw urology for elevated PSA. Advised pt to discuss with urology.     Elevated PSA---3/2024 PSA 6.57 elevated. Saw urology, recommend repeat PSA in 12/2024, not done yet.     Hyperlipidemia---He is on Fenofibrate 160 mg daily, Zetia 10 mg daily.    Patient does not exercise on a regular basis.    IFG---3/2024 hgA1C 6.4  Denies Fhx of DM.      Lives with wife. Does [employer]. Driving.   Fell in 12/2024.   Refused to answer depression screening because \"it is my personal life.\"      Review of Systems   Constitutional:  Negative for appetite change, chills and fever.   HENT:  Negative for congestion, ear pain, sinus pain and sore throat.    Eyes:  Negative for discharge and itching.   Respiratory:  Negative for apnea, cough, chest tightness, shortness of breath and wheezing.    Cardiovascular:  Negative for chest pain, palpitations and leg swelling.   Gastrointestinal:  Negative for abdominal pain, anal bleeding, constipation, diarrhea, nausea and vomiting.   Endocrine: Negative for cold intolerance, heat intolerance and polyuria.   Genitourinary:  Negative for difficulty urinating and dysuria.   Musculoskeletal:  Negative for arthralgias, back pain and myalgias.   Skin:  Negative for rash.   Neurological:  Negative for dizziness and headaches.   Psychiatric/Behavioral:  Negative for agitation.        Objective   /82   Pulse 104   Temp 98.5 °F (36.9 °C) (Tympanic)   Resp 18   Ht 5' 6\" (1.676 m)   Wt 85.4 kg (188 lb 3.2 oz)   SpO2 96%   BMI 30.38 kg/m²      Physical Exam  Constitutional:       Appearance: He is well-developed.   HENT:      Head: Normocephalic and atraumatic.   Eyes:      General:         Right eye: No discharge.         Left eye: No discharge.      Conjunctiva/sclera: " Conjunctivae normal.   Cardiovascular:      Rate and Rhythm: Normal rate and regular rhythm.      Heart sounds: Normal heart sounds. No murmur heard.     No friction rub. No gallop.   Pulmonary:      Effort: Pulmonary effort is normal. No respiratory distress.      Breath sounds: Normal breath sounds. No wheezing or rales.   Abdominal:      General: Bowel sounds are normal. There is no distension.      Palpations: Abdomen is soft.      Tenderness: There is no abdominal tenderness. There is no guarding.   Musculoskeletal:         General: Normal range of motion.      Cervical back: Normal range of motion and neck supple.      Right lower leg: No edema.      Left lower leg: No edema.   Neurological:      Mental Status: He is alert.

## 2025-03-11 NOTE — ASSESSMENT & PLAN NOTE
Low fat diet. Continue zetia and fenofibrate.   Orders:    Comprehensive metabolic panel; Future    Lipid panel; Future    Hemoglobin A1C; Future    CBC and differential; Future    ezetimibe (ZETIA) 10 mg tablet; Take 1 tablet (10 mg total) by mouth daily    fenofibrate 160 MG tablet; Take 1 tablet (160 mg total) by mouth daily

## 2025-03-11 NOTE — PROGRESS NOTES
Name: Eddie Perez Jr.      : 1950      MRN: [id number]  Encounter Provider: Bogdan Solis MD  Encounter Date: 3/11/2025   Encounter department: [facility]  Chief Complaint   Patient presents with    Medicare Wellness Visit     Subsequent Visit      Health Maintenance   Topic Date Due    Zoster Vaccine (1 of 2) Never done    Pneumococcal Vaccine: 65+ Years (1 of 1 - PCV) Never done    Colorectal Cancer Screening  2023    Influenza Vaccine (1) Never done    COVID-19 Vaccine ( - 2024-25 season) Never done    Depression Screening  2025    Medicare Annual Wellness Visit (AWV)  2025    Fall Risk  2025    RSV Vaccine for Pregnant Patients and Patients Age 60+ Years (1 - 1-dose 75+ series) 2025    Hepatitis C Screening  Completed    Meningococcal B Vaccine  Aged Out    RSV Vaccine age 0-20 Months  Aged Out    HIB Vaccine  Aged Out    IPV Vaccine  Aged Out    Hepatitis A Vaccine  Aged Out    Meningococcal ACWY Vaccine  Aged Out    HPV Vaccine  Aged Out       Assessment & Plan  Screening for colorectal cancer         PND (post-nasal drip)         Other fatigue         Itchy scalp         Gastroesophageal reflux disease, unspecified whether esophagitis present         Mixed hyperlipidemia         Prediabetes            Refused shots.   Colonoscopy 2020, recheck in 3 years.       Preventive health issues were discussed with patient, and age appropriate screening tests were ordered as noted in patient's After Visit Summary. Personalized health advice and appropriate referrals for health education or preventive services given if needed, as noted in patient's After Visit Summary.    History of Present Illness   {?Quick Links Encounters * My Last Note * Last Note in Specialty * Snapshot * Since Last Visit * History :23899}  HPI    Left knee replacement    Weight    Running nose/cough---had surgery before. Saw ENT.     Fatigue  Stigma    Itchy skin/itchy scalp---no  rash.       GERD---He is on Famotidine 20 mg twice, still feels symptoms.       ED---refused to take medications. DW urology.     Hyperlipidemia---He is on Fenofibrate 160 mg daily, Zetia 10 mg daily.    Patient does not exercise on a regular basis.    IFG---3/2024 hgA1C 6.4  Denies Fhx of DM.     Elevated PSA---3/2024 PSA 6.57 elevated. Saw urology, recommend repeat PSA in 12/2024, not done yet.      Patient has been followed by orthopedic surgeon Dr. Barbour for left knee OA, had Visco injections last year which did not provide any significant relief in pain.    Started taking collagen.    Lives with wife. Does all"[employer]s. Driving.   Fell in 12/2024.   Denies depression.     Patient Care Team:  Bogdan Solis MD as PCP - General (Family Medicine)  MD Mu Callahan MD (Colon and Rectal Surgery)    Review of Systems  Medical History Reviewed by provider this encounter:       Annual Wellness Visit Questionnaire   Eddie is here for his Subsequent Wellness visit.     Health Risk Assessment:   Patient rates overall health as good. Patient feels that their physical health rating is same. Patient is satisfied with their life. Eyesight was rated as same. Hearing was rated as same. Patient feels that their emotional and mental health rating is same. Patients states they are never, rarely angry. Patient states they are never, rarely unusually tired/fatigued. Pain experienced in the last 7 days has been some. Patient's pain rating has been 3/10. Patient states that he has experienced no weight loss or gain in last 6 months.     Fall Risk Screening:   In the past year, patient has experienced: no history of falling in past year      Home Safety:  Patient does not have trouble with stairs inside or outside of their home. Patient has working smoke alarms and has working carbon monoxide detector. Home safety hazards include: none.     Nutrition:   Current diet is Regular and Low Carb.     Medications:    Patient is not currently taking any over-the-counter supplements. Patient is able to manage medications.     Activities of Daily Living (ADLs)/Instrumental Activities of Daily Living (IADLs):   Walk and transfer into and out of bed and chair?: Yes  Dress and groom yourself?: Yes    Bathe or shower yourself?: Yes    Feed yourself? Yes  Do your laundry/housekeeping?: Yes  Manage your money, pay your bills and track your expenses?: Yes  Make your own meals?: Yes    Do your own shopping?: Yes    Previous Hospitalizations:   Any hospitalizations or ED visits within the last 12 months?: No      Advance Care Planning:   Living will: No    Durable POA for healthcare: No    Advanced directive: No    Advanced directive counseling given: Yes    ACP document given: Yes      Cognitive Screening:   Provider or family/friend/caregiver concerned regarding cognition?: No    PREVENTIVE SCREENINGS      Cardiovascular Screening:    General: Screening Not Indicated and History Lipid Disorder      Diabetes Screening:     General: Screening Current      Colorectal Cancer Screening:     General: Screening Current      Prostate Cancer Screening:    General: Screening Current    Due for: PSA      Osteoporosis Screening:    General: Screening Not Indicated and History Osteoporosis      Abdominal Aortic Aneurysm (AAA) Screening:    Risk factors include: age between 65-76 yo        General: Screening Not Indicated      Lung Cancer Screening:     General: Screening Not Indicated      Hepatitis C Screening:    General: Screening Current    Screening, Brief Intervention, and Referral to Treatment (SBIRT)     Screening  Typical number of drinks in a day: 0  Typical number of drinks in a week: 2  Interpretation: Low risk drinking behavior.    Single Item Drug Screening:  How often have you used an illegal drug (including marijuana) or a prescription medication for non-medical reasons in the past year? never    Single Item Drug Screen Score:  "0  Interpretation: Negative screen for possible drug use disorder    Other Counseling Topics:   Car/seat belt/driving safety, skin self-exam and calcium and vitamin D intake and regular weightbearing exercise.     Social Drivers of Health     Financial Resource Strain: Low Risk  (8/5/2024)    Received from [facility]    Overall Financial Resource Strain (CARDIA)     Difficulty of Paying Living Expenses: Not hard at all   Food Insecurity: No Food Insecurity (8/5/2024)    Received from [facility]    Hunger Vital Sign     Worried About Running Out of Food in the Last Year: Never true     Ran Out of Food in the Last Year: Never true   Transportation Needs: No Transportation Needs (8/5/2024)    Received from [facility]    PRAPARE - Transportation     Lack of Transportation (Medical): No     Lack of Transportation (Non-Medical): No   Housing Stability: Low Risk  (8/5/2024)    Received from [facility]    Housing Stability Vital Sign     Unable to Pay for Housing in the Last Year: No     Number of Times Moved in the Last Year: 0     Homeless in the Last Year: No   Utilities: Not At Risk (8/5/2024)    Received from [facility]     Threatened with loss of utilities: No     No results found.    Objective {?Quick Links Trend Vitals * Enter New Vitals * Results Review * Timeline (Adult) * Labs * Imaging * Cardiology * Procedures * Lung Cancer Screening * Surgical eConsent :29200}  /92   Pulse 104   Temp 98.5 °F (36.9 °C) (Tympanic)   Resp 18   Ht 5' 6\" (1.676 m)   Wt 85.4 kg (188 lb 3.2 oz)   SpO2 96%   BMI 30.38 kg/m²     Physical Exam  {Administrative / Billing Section (Optional):13257}  "

## 2025-03-11 NOTE — ASSESSMENT & PLAN NOTE
Pepcid 20mg bid did not help. Give prilosec 40mg QD. Refer to GI.   Orders:    Ambulatory Referral to Gastroenterology; Future    omeprazole (PriLOSEC) 40 MG capsule; Take 1 capsule (40 mg total) by mouth daily

## 2025-03-11 NOTE — ASSESSMENT & PLAN NOTE
Low carb diet.  Orders:    Comprehensive metabolic panel; Future    Lipid panel; Future    Hemoglobin A1C; Future    CBC and differential; Future

## 2025-03-12 ENCOUNTER — APPOINTMENT (OUTPATIENT)
Dept: LAB | Facility: CLINIC | Age: 75
End: 2025-03-12
Payer: MEDICARE

## 2025-03-12 DIAGNOSIS — E78.2 MIXED HYPERLIPIDEMIA: ICD-10-CM

## 2025-03-12 DIAGNOSIS — R73.03 PREDIABETES: ICD-10-CM

## 2025-03-12 DIAGNOSIS — R53.83 OTHER FATIGUE: ICD-10-CM

## 2025-03-12 DIAGNOSIS — R97.20 ELEVATED PSA: ICD-10-CM

## 2025-03-12 LAB
BASOPHILS # BLD AUTO: 0.1 THOUSANDS/ÂΜL (ref 0–0.1)
BASOPHILS NFR BLD AUTO: 1 % (ref 0–1)
EOSINOPHIL # BLD AUTO: 0.28 THOUSAND/ÂΜL (ref 0–0.61)
EOSINOPHIL NFR BLD AUTO: 4 % (ref 0–6)
ERYTHROCYTE [DISTWIDTH] IN BLOOD BY AUTOMATED COUNT: 13.7 % (ref 11.6–15.1)
EST. AVERAGE GLUCOSE BLD GHB EST-MCNC: 131 MG/DL
HBA1C MFR BLD: 6.2 %
HCT VFR BLD AUTO: 44.3 % (ref 36.5–49.3)
HGB BLD-MCNC: 14.4 G/DL (ref 12–17)
IMM GRANULOCYTES # BLD AUTO: 0.02 THOUSAND/UL (ref 0–0.2)
IMM GRANULOCYTES NFR BLD AUTO: 0 % (ref 0–2)
LYMPHOCYTES # BLD AUTO: 2.42 THOUSANDS/ÂΜL (ref 0.6–4.47)
LYMPHOCYTES NFR BLD AUTO: 33 % (ref 14–44)
MCH RBC QN AUTO: 29.3 PG (ref 26.8–34.3)
MCHC RBC AUTO-ENTMCNC: 32.5 G/DL (ref 31.4–37.4)
MCV RBC AUTO: 90 FL (ref 82–98)
MONOCYTES # BLD AUTO: 0.96 THOUSAND/ÂΜL (ref 0.17–1.22)
MONOCYTES NFR BLD AUTO: 13 % (ref 4–12)
NEUTROPHILS # BLD AUTO: 3.5 THOUSANDS/ÂΜL (ref 1.85–7.62)
NEUTS SEG NFR BLD AUTO: 49 % (ref 43–75)
NRBC BLD AUTO-RTO: 0 /100 WBCS
PLATELET # BLD AUTO: 243 THOUSANDS/UL (ref 149–390)
PMV BLD AUTO: 12.1 FL (ref 8.9–12.7)
RBC # BLD AUTO: 4.91 MILLION/UL (ref 3.88–5.62)
TSH SERPL DL<=0.05 MIU/L-ACNC: 3.11 UIU/ML (ref 0.45–4.5)
WBC # BLD AUTO: 7.28 THOUSAND/UL (ref 4.31–10.16)

## 2025-03-12 PROCEDURE — 83036 HEMOGLOBIN GLYCOSYLATED A1C: CPT

## 2025-03-12 PROCEDURE — 36415 COLL VENOUS BLD VENIPUNCTURE: CPT

## 2025-03-12 PROCEDURE — 85025 COMPLETE CBC W/AUTO DIFF WBC: CPT

## 2025-03-12 PROCEDURE — 84443 ASSAY THYROID STIM HORMONE: CPT

## 2025-03-13 ENCOUNTER — RESULTS FOLLOW-UP (OUTPATIENT)
Dept: FAMILY MEDICINE CLINIC | Facility: CLINIC | Age: 75
End: 2025-03-13

## 2025-03-18 ENCOUNTER — APPOINTMENT (OUTPATIENT)
Dept: RADIOLOGY | Facility: AMBULARY SURGERY CENTER | Age: 75
End: 2025-03-18
Attending: ORTHOPAEDIC SURGERY
Payer: MEDICARE

## 2025-03-18 ENCOUNTER — OFFICE VISIT (OUTPATIENT)
Dept: OBGYN CLINIC | Facility: CLINIC | Age: 75
End: 2025-03-18
Payer: MEDICARE

## 2025-03-18 VITALS — BODY MASS INDEX: 28.25 KG/M2 | WEIGHT: 180 LBS | HEIGHT: 67 IN

## 2025-03-18 DIAGNOSIS — M76.822 POSTERIOR TIBIAL TENDON DYSFUNCTION (PTTD) OF LEFT LOWER EXTREMITY: Primary | ICD-10-CM

## 2025-03-18 DIAGNOSIS — Z01.89 ENCOUNTER FOR LOWER EXTREMITY COMPARISON IMAGING STUDY: ICD-10-CM

## 2025-03-18 DIAGNOSIS — M76.822 POSTERIOR TIBIAL TENDON DYSFUNCTION (PTTD) OF LEFT LOWER EXTREMITY: ICD-10-CM

## 2025-03-18 PROCEDURE — 73620 X-RAY EXAM OF FOOT: CPT

## 2025-03-18 PROCEDURE — 73630 X-RAY EXAM OF FOOT: CPT

## 2025-03-18 PROCEDURE — 99214 OFFICE O/P EST MOD 30 MIN: CPT | Performed by: ORTHOPAEDIC SURGERY

## 2025-03-18 PROCEDURE — 73600 X-RAY EXAM OF ANKLE: CPT

## 2025-03-18 NOTE — PROGRESS NOTES
James R Lachman, M.D.  Attending, Orthopaedic Surgery  Foot and Ankle  [facility]      ORTHOPAEDIC FOOT AND ANKLE CLINIC VISIT     Assessment:     Encounter Diagnoses   Name Primary?    Posterior tibial tendon dysfunction (PTTD) of left lower extremity Yes    Encounter for lower extremity comparison imaging study             Plan:   The patient verbalized understanding of exam findings and treatment plan. We engaged in the shared decision-making process and treatment options were discussed at length with the patient. Surgical and conservative management discussed today along with risks and benefits.  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      History of Present Illness:   Chief Complaint:   Chief Complaint   Patient presents with    Left Ankle - Pain     Has a stabbing pain on the bottom. Has flat feet. Has the inserts.      Eddie Perez Jr. is a 74 y.o. male who is being seen for left foot pain. Pain began over a year ago.  Pain is localized at medial foot with minimal radiating and described as sharp and severe. Patient denies numbness, tingling or radicular pain.  Denies history of neuropathy.  Patient does not smoke, does not have diabetes and does not take blood thinners.  Patient denies family history of anesthesia complications and has not had any complications with anesthesia.     Pain/symptom timing:  Worse during the day when active  Pain/symptom context:  Worse with activites and work  Pain/symptom modifying factors:  Rest makes better, activities make worse  Pain/symptom associated signs/symptoms: none    Prior treatment  "  NSAIDsNo   Injections No   Bracing/Orthotics No    Physical Therapy No     Orthopedic Surgical History:   See below    Past Medical, Surgical and Social History:  Past Medical History:  has a past medical history of Allergic rhinitis, BPH (benign prostatic hyperplasia), GERD (gastroesophageal reflux disease), HL (hearing loss), Hyperlipidemia, Inguinal hernia of left side with obstruction, Nephrolithiasis, and Tinnitus.  Problem List: does not have any pertinent problems on file.  Past Surgical History:  has a past surgical history that includes Tonsillectomy; ADENOIDECTOMY; Inguinal hernia repair; [place] tooth extraction; pr rpr 1st ingun hrna age 5 yrs/> reducible (Left, 10/14/2021); and Replacement total knee (Left, 08/05/2024).  Family History: family history includes Skin cancer in his father.  Social History:  reports that he has never smoked. He has never been exposed to tobacco smoke. He has never used smokeless tobacco. He reports that he does not drink alcohol and does not use drugs.  Current Medications: has a current medication list which includes the following prescription(s): calcium carb-cholecalciferol, ezetimibe, fenofibrate, glucosamine-chondroitin-msm, ketoconazole, multivitamin, and omeprazole.  Allergies: is allergic to other.     Review of Systems:  General- denies fever/chills  HEENT- denies hearing loss or sore throat  Eyes- denies eye pain or visual disturbances, denies red eyes  Respiratory- denies cough or SOB  Cardio- denies chest pain or palpitations  GI- denies abdominal pain  Endocrine- denies urinary frequency  Urinary- denies pain with urination  Musculoskeletal- Negative except noted above  Skin- denies rashes or wounds  Neurological- denies dizziness or headache  Psychiatric- denies anxiety or difficulty concentrating    Physical Exam:   Ht 5' 7\" (1.702 m)   Wt 81.6 kg (180 lb)   BMI 28.19 kg/m²   General/Constitutional: No apparent distress: well-nourished and well " developed.  Eyes: normal ocular motion  Cardio: RRR, Normal S1S2, No m/r/g  Lymphatic: No appreciable lymphadenopathy  Respiratory: Non-labored breathing, CTA b/l no w/c/r  Vascular: No edema, swelling or tenderness, except as noted in detailed exam.  Integumentary: No impressive skin lesions present, except as noted in detailed exam.  Neuro: No ataxia or tremors noted  Psych: Normal mood and affect, oriented to person, place and time. Appropriate affect.  Musculoskeletal: Normal, except as noted in detailed exam and in HPI.    Examination    Left    Gait Normal   Musculoskeletal Tender to palpation at medial foot    Skin Normal.      Nails Normal    Range of Motion  20 degrees dorsiflexion, 30 degrees plantarflexion  Subtalar motion: normal    Stability Stable    Muscle Strength 5/5 tibialis anterior  5/5 gastrocnemius-soleus  5/5 posterior tibialis  5/5 peroneal/eversion strength  5/5 EHL  5/5 FHL    Neurologic Normal    Sensation Intact to light touch throughout sural, saphenous, superficial peroneal, deep peroneal and medial/lateral plantar nerve distributions.  [place]-Nava 5.07 filament (10g) testing deferred.    Cardiovascular Brisk capillary refill < 2 seconds,intact DP and PT pulses    Special Tests None      Imaging Studies:   3 views of the left foot were taken, reviewed and interpreted independently that demonstrate increased Meary's angle and collapse of forefoot arch, talonavicular uncoverage and hindfoot valgus. Reviewed by me personally.        James R. Lachman, MD  Foot & Ankle Surgery   Department of Orthopaedic Surgery  [facility]      I personally performed the service.    James R. Lachman, MD

## 2025-03-18 NOTE — PATIENT INSTRUCTIONS
Stage IV posterior tibial tendon dysfunction  Activity modification  Shoe choice  [place] brace  We prescribed this today    Today, We recommended a brace/prosthesis for you. [facility] certified pedorthotists make orthotics but not braces or prosthesis.  Below are the companies in the area that make these, Please call ahead for an appointment and to ensure the company accepts your insurance. Make sure to bring the prescription given to you in the office today to the company for the brace/prosthesis.    Gus Melvin [facility]  [address]  [place] [address]  [facility] Phone: [phone]  [place] Fax: [phone]    [address]  [address]  [place] [address]  (By Appointment Only)  Directions  [address]  [address]  [place] [address]  Directions  [place] Phone: [phone]  [place] Fax: [phone]    [facility] Location  [address]  [facility] [address]  [phone] (fax)    [place] Location  [address], [address]  [place] [address]  [phone] (fax)    [place] Location  [address] [facility] [address] [facility] [address]  [place] [address] [phone] (fax)    [facility] Location  [address], [address]  [facility] [address]  [phone] (fax)    [place] Location  [address], [place] [address]  [phone] (fax)    [employer], [employer], [employer] are good brands but I recommend going to a dedicate shoe store (not [employer] or [employer].) At these types of stores, they have experts that can fit you for shoes appropriate for your foot problem. Shoe choice is essential to solving/improving most types of foot pain.  Even after a surgery, good shoes are necessary to keep the foot as comfortable as possible.    [employer]  [address] [place] [address]  [phone]    [address], [place] [address]  [phone]    [employer]  [address] [place] [address]  [phone]    [employer]   [address] [facility]  [phone]    [employer]  [address], [place] [address]  [phone]    [employer]  [address] [facility] [phone]    [facility] [employer]   [address] [place] [address]  [phone]    [employer]  [address] [place], [place]  [phone]    [employer] [address], [address]  [phone]    [employer] [address], [address], [place] [address] [phone]              Flatfoot Surgical Correction     What is adult flatfoot?  Adult flatfoot is a condition that causes flattening of the arch of the foot.       X-ray views of a flatfoot before and after surgery. This patient had a first tarsal-metatarsal fusion, a medializing calcaneal osteotomy and a lateral column lengthening.            What are the goals of flatfoot surgical correction?  The goal of surgical correction is to improve alignment of the foot. This allows for more normal pressures during standing and walking. A combination of procedures is performed to repair the ligaments and tendons that support the arch. Bone cuts are often made to help restore the arch. Proper correction of flatfoot deformity can often help to improve pain and walking ability.      What signs indicate surgery may be needed?  Patients with flatfoot frequently describe ankle pain and difficulty with daily activities. Surgical reconstruction of the flatfoot is performed in patients with an arch collapse that is still flexible (not stiff). An orthopaedic foot and ankle surgeon should do a complete evaluation of the foot. This includes a medical history, physical exam and  X-rays. A trial of non-operative treatment should be completed prior to any  decision to have surgery. Treatments can include rest, immobilization, shoe inserts, braces and physical therapy. If these are unsuccessful, surgery can be considered.     When should I avoid surgery?  Patients who have diabetes or take oral steroids should be evaluated by their primary care physician. These conditions may prevent you from being able to safely have surgery. Obese patients and smokers are at higher risk for blood clots and wound problems. Full recovery from flatfoot surgery can take up to a year. Patients who are unable or unwilling to complete this process should not have this surgery.     General Details of Procedure  A combination of surgical procedures can be used to reconstruct the flatfoot. Generally, these procedures can be  into those that correct deformities of the bones and those that repair ligaments and tendons. Your orthopaedic surgeon will choose the proper combination of procedures for your foot.     Surgery of the foot can be performed under regional anesthesia, which is numbing the foot and ankle with a nerve or spinal block, or general anesthesia, which may require a breathing tube. A nerve block is often placed behind the knee to reduce pain after surgery.     Specific Techniques  Medializing Calcaneal Osteotomy  A medializing calcaneal osteotomy (heel slide) procedure is often used when the calcaneus (heel bone) has shifted out from underneath the leg. An incision is made on the outside of the heel, and the back half of the heel bone is cut and slid back underneath the leg. The heel is then fixed in place using metal screws or a plate.     Lateral Column Lengthening  Outward rotation of the foot may occur in patients with flatfoot. A lateral column lengthening procedure is sometimes performed for these patients. An incision is made on the outside of the foot, and the front half of the heel bone is cut. A bone wedge is then placed into the cut area of the heel bone. This  wedge helps to “lengthen” the heel bone and rotate the foot back into its correct position. The wedge is usually kept in place using screws or a plate. The wedge can be taken from a cadaver or from a patient’s own hip.     Medial Cuneiform Dorsal Opening Wedge Osteotomy or First Tarsal-Metatarsal Fusion  Arch collapse can lead to the big toe side of the foot being raised above the ground. Your surgeon may perform a dorsal       X-ray views of a flatfoot before and after surgery. This patient had a first tarsal-metatarsal fusion, a medializing calcaneal osteotomy and a lateral column lengthening.     opening wedge osteotomy of the medial cuneiform bone to treat this problem. An alternative is to perform a first tarsal-metatarsal joint fusion. Both procedures involve an incision over the top of the foot. In the case of the dorsal opening wedge osteotomy, a bone wedge is placed into the top portion of the bone to push it down toward the floor. In the case of the fusion, the bone is pushed down toward the floor at the level of a joint in the middle of the foot and the bones are fused into that position. Screws or a plate can be used to keep the wedge in place or to fuse the joint.     Tendon and Ligament Procedures  The posterior tibial tendon runs underneath the arch of the foot. It is often stretched and dysfunctional in patients with flatfoot. The tendon often requires removal if it is thickened or torn. Usually the tendon that bends the little toes can be transferred (rerouted) to help support the arch. The stresses placed on the flatfoot can lead to tearing of the ligaments that support the arch (spring ligament) and the inside of the ankle (deltoid ligament). Your surgeon may decide to repair these structures if significant damage has been done. Finally, the flatfoot condition is often associated with tightness of the Achilles tendon. This can be treated using a lengthening procedure to stretch the muscle fibers  of the calf.      Double or Triple Arthrodesis  In the later stages of flatfoot, deformities are frequently inflexible (stiff). Arthritis of the foot may be present as well. Surgical correction of these severe cases requires fusion of one or more of the foot joints. This procedure is referred to as a double or triple arthrodesis depending on the number of joints fused. For more information, see the Triple Arthrodesis page.     What happens after surgery?  Patients may go home the day of surgery or they may require an overnight hospital stay. The leg will be placed in a splint or cast and should be kept elevated for the first two weeks. At that point, sutures are removed. A new cast or a removable boot is then placed. It is important that patients do not put any weight on the corrected foot for six to eight weeks following the operation. Patients may begin bearing weight at eight weeks and usually progress to full weightbearing by 10 to 12 weeks. For some patients, weightbearing requires additional time. After 12 weeks, patients commonly can transition to wearing a shoe. Inserts and ankle braces are often used. Physical therapy may be recommended.     Potential Complications  There are complications that relate to surgery in general. These include the risks associated with anesthesia, infection, damage to nerves and blood vessels, and bleeding or blood clots.     Complications following flatfoot surgery may include wound breakdown or nonunion (incomplete healing of the bones). These complications often can be prevented with proper wound care and rehabilitation. Occasionally, patients may notice some discomfort due to prominent hardware. Removal of hardware can be done at a later time if this is an issue. The overall complication rates for flatfoot surgery are low.     Frequently Asked Questions  Will surgical correction of my flatfoot improve the cosmetic appearance of my foot?  Surgical correction of flatfoot is  aimed primarily at reducing pain and restoring function. Although surgery will likely improve the cosmetic appearance of the foot, it is not one of the primary goals of treatment.     What activities will I be able to do following flatfoot surgery?  With proper correction and rehabilitation, many patients return to active lifestyles. Activities such as walking, biking, driving and even golfing are well tolerated. It is less likely, however, that patients will be able to participate in very strenuous activities requiring running, cutting or jumping.

## 2025-04-01 ENCOUNTER — TELEPHONE (OUTPATIENT)
Age: 75
End: 2025-04-01

## 2025-04-01 NOTE — TELEPHONE ENCOUNTER
Caller: Elijah    Doctor/Office: Dr Lachman    CB#: [phone]      What needs to be faxed: Forms from 3/27 in media    ATTN to: Althea    Fax#: [phone]      Documents were successfully e-faxed

## 2025-04-23 ENCOUNTER — TELEPHONE (OUTPATIENT)
Dept: OBGYN CLINIC | Facility: HOSPITAL | Age: 75
End: 2025-04-23

## 2025-04-23 NOTE — TELEPHONE ENCOUNTER
"Called and spoke w/pt and relayed ELIECER Thompson's msg in detail. Pt states,\"ok\". CB if needed.   "

## 2025-04-23 NOTE — TELEPHONE ENCOUNTER
Called and spoke w/pt and he saw Dr Lachman on 3/18/25 for Posterior tibial tendon dysfunction of LLE.  He has not gotten his [place] Brace yet-may come by May 5th. When pt is sitting he has no pain and when standing has excruiating pain 10/10. He stook Aleve 2 tabs yesterday and 1 oxycontin that was leftover from a previous surgery that took edge off. Advise trying Aleve 1 tab twice a day and adding tylenol per label instructions not to exceed 3000mg in 24hours. Pt states has ES Tylenol at home so may take Tylenol ES 2 tabs every 8 hours as needed or 1 tab every 6 hours as needed. Can try heat or ice to area. Will forward to Dr Lachman to review and advice further. Thank you.

## 2025-04-23 NOTE — TELEPHONE ENCOUNTER
Caller: Patient    Doctor: Dr. Lachman    Reason for call: Patient stated his left foot is more painful than before and he's unable to walk on it. Offered patient a sooner appt, patient declined. Please advise.    call back#: [phone]

## 2025-05-28 ENCOUNTER — TELEPHONE (OUTPATIENT)
Age: 75
End: 2025-05-28

## 2025-05-28 NOTE — TELEPHONE ENCOUNTER
Call from Nataliia at [facility] stating patient is scheduled for surgery for revision of left knee replacement on 6/6, patient would need pre op appointment. Scheduled for 6/2, requesting EKG as well. Will be faxing clearance form to office

## 2025-05-29 ENCOUNTER — APPOINTMENT (OUTPATIENT)
Dept: LAB | Facility: CLINIC | Age: 75
End: 2025-05-29
Payer: MEDICARE

## 2025-05-29 ENCOUNTER — RESULTS FOLLOW-UP (OUTPATIENT)
Dept: UROLOGY | Facility: AMBULATORY SURGERY CENTER | Age: 75
End: 2025-05-29

## 2025-05-29 DIAGNOSIS — Z01.818 PRE-OP TESTING: ICD-10-CM

## 2025-05-29 LAB
ALBUMIN SERPL BCG-MCNC: 4.3 G/DL (ref 3.5–5)
ALP SERPL-CCNC: 41 U/L (ref 34–104)
ALT SERPL W P-5'-P-CCNC: 27 U/L (ref 7–52)
ANION GAP SERPL CALCULATED.3IONS-SCNC: 11 MMOL/L (ref 4–13)
AST SERPL W P-5'-P-CCNC: 25 U/L (ref 13–39)
BASOPHILS # BLD AUTO: 0.06 THOUSANDS/ÂΜL (ref 0–0.1)
BASOPHILS NFR BLD AUTO: 1 % (ref 0–1)
BILIRUB SERPL-MCNC: 0.66 MG/DL (ref 0.2–1)
BUN SERPL-MCNC: 16 MG/DL (ref 5–25)
CALCIUM SERPL-MCNC: 9.7 MG/DL (ref 8.4–10.2)
CHLORIDE SERPL-SCNC: 101 MMOL/L (ref 96–108)
CO2 SERPL-SCNC: 30 MMOL/L (ref 21–32)
CREAT SERPL-MCNC: 1.18 MG/DL (ref 0.6–1.3)
EOSINOPHIL # BLD AUTO: 0.23 THOUSAND/ÂΜL (ref 0–0.61)
EOSINOPHIL NFR BLD AUTO: 2 % (ref 0–6)
ERYTHROCYTE [DISTWIDTH] IN BLOOD BY AUTOMATED COUNT: 13.9 % (ref 11.6–15.1)
GFR SERPL CREATININE-BSD FRML MDRD: 60 ML/MIN/1.73SQ M
GLUCOSE P FAST SERPL-MCNC: 99 MG/DL (ref 65–99)
HCT VFR BLD AUTO: 45.4 % (ref 36.5–49.3)
HGB BLD-MCNC: 14.3 G/DL (ref 12–17)
IMM GRANULOCYTES # BLD AUTO: 0.05 THOUSAND/UL (ref 0–0.2)
IMM GRANULOCYTES NFR BLD AUTO: 1 % (ref 0–2)
LYMPHOCYTES # BLD AUTO: 1.97 THOUSANDS/ÂΜL (ref 0.6–4.47)
LYMPHOCYTES NFR BLD AUTO: 19 % (ref 14–44)
MCH RBC QN AUTO: 29 PG (ref 26.8–34.3)
MCHC RBC AUTO-ENTMCNC: 31.5 G/DL (ref 31.4–37.4)
MCV RBC AUTO: 92 FL (ref 82–98)
MONOCYTES # BLD AUTO: 1.28 THOUSAND/ÂΜL (ref 0.17–1.22)
MONOCYTES NFR BLD AUTO: 12 % (ref 4–12)
NEUTROPHILS # BLD AUTO: 6.74 THOUSANDS/ÂΜL (ref 1.85–7.62)
NEUTS SEG NFR BLD AUTO: 65 % (ref 43–75)
NRBC BLD AUTO-RTO: 0 /100 WBCS
PLATELET # BLD AUTO: 286 THOUSANDS/UL (ref 149–390)
PMV BLD AUTO: 12.2 FL (ref 8.9–12.7)
POTASSIUM SERPL-SCNC: 4.6 MMOL/L (ref 3.5–5.3)
PROT SERPL-MCNC: 6.9 G/DL (ref 6.4–8.4)
PSA SERPL-MCNC: 7.44 NG/ML (ref 0–4)
RBC # BLD AUTO: 4.93 MILLION/UL (ref 3.88–5.62)
SODIUM SERPL-SCNC: 142 MMOL/L (ref 135–147)
WBC # BLD AUTO: 10.33 THOUSAND/UL (ref 4.31–10.16)

## 2025-05-29 PROCEDURE — 85025 COMPLETE CBC W/AUTO DIFF WBC: CPT

## 2025-05-29 PROCEDURE — 80053 COMPREHEN METABOLIC PANEL: CPT

## 2025-05-29 NOTE — PROGRESS NOTES
Pre-operative Clearance  Name: Eddie Perez Jr.      : 1950      MRN: [id number]  Encounter Provider: ANNITA Brewster  Encounter Date: 2025   Encounter department: [facility]    :  Assessment & Plan  Gastroesophageal reflux disease, unspecified whether esophagitis present  Continues with Prilosec, symptoms stable.        BPH associated with nocturia  Continues to follow with urology.       Mixed hyperlipidemia  Component      Latest Ref Rng 3/7/2024   Cholesterol      See Comment mg/dL 167    Triglycerides      See Comment mg/dL 167 (H)    HDL      >=40 mg/dL 40    LDL Calculated      0 - 100 mg/dL 94    Non-HDL Cholesterol      mg/dl 127       Legend:  (H) High    The 10-year ASCVD risk score (Kory ARENAS, et al., 2019) is: 26.5%    Values used to calculate the score:      Age: 74 years      Clincally relevant sex: Male      Is Non- : No      Diabetic: No      Tobacco smoker: No      Systolic Blood Pressure: 138 mmHg      Is BP treated: No      HDL Cholesterol: 40 mg/dL      Total Cholesterol: 167 mg/dL       Patient continues on Zetia and fenofibrate.  Low-fat diet recommended.  Due for lipid panel  Prediabetes  Lab Results   Component Value Date    HGBA1C 6.2 (H) 2025     Last A1c 6.2.  Low-carb diet recommended.  Exercise and weight loss recommended.  The patient has found it difficult to exercise due to knee pain.       Elevated PSA  Lab Results   Component Value Date    PSA 7.438 (H) 2025    PSA 6.57 (H) 2024    PSA 7.6 (H) 2023     Most recent PSA elevated however he is following with urology.  He did have an MRI of his prostate in the past.  Continue close surveillance with urology.       Chronic pain of left knee  Patient with previous left total knee replacement.  He is scheduled next week for a  revision of a left total knee arthroplasty.         Preop examination    Orders:    POCT ECG        Pre-operative  Clearance:     Revised Cardiac Risk Index:  RCI RISK CLASS I (0 risk factors, risk of major cardiac complications approximately 0.5%)    Clearance:  Patient is medically optimized (CLEARED) for proposed surgery without any additional cardiac testing.      Medication Instructions:   - Avoid herbs or non-directed vitamins one week prior to surgery    - Avoid aspirin containing medications or non-steroidal anti-inflammatory drugs one week preceding surgery    - May take tylenol for pain up until the night before surgery    - Hyperlipidemia meds: Continue to take this medication on your normal schedule.      Depression Screening and Follow-up Plan: Patient was screened for depression during today's encounter. They screened negative with a PHQ-2 score of 0.      Falls Plan of Care: Home safety education provided.       History of Present Illness     Pre-Op Examination     Surgery: Revision of left total knee arthroplasty   Anticipated Date of Surgery: 6/6/2025   Surgeon: Guillermo Jacobo MD     Patient presents to the office today for preop exam.  The patient will be having revision of a left total knee arthroplasty performed next week.  Preop blood work with the patient.  EKG in the office today is normal.  I did recommend he stop his glucosamine prior to surgery and he has already done so.     Previous history of bleeding disorders or clots?: No    Previous Anesthesia reaction?: No    Prolonged steroid use in the last 6 months?: No      Assessment of Cardiac Risk:   - Unstable or severe angina or MI in the last 6 weeks or history of stent placement in the last year?: No    - Decompensated heart failure (e.g. New onset heart failure, NYHA  Class IV heart failure, or worsening existing heart failure)?: No    - Significant arrhythmias such as high grade AV block, symptomatic ventricular arrhythmia, newly recognized ventricular tachycardia, supraventricular tachycardia with resting heart rate >100, or symptomatic bradycardia?:  No    - Severe heart valve disease including aortic stenosis or symptomatic mitral stenosis?: No      Pre-operative Risk Factors:  - Elevated-risk surgery: No    - History of cerebrovascular disease: No    - History of ischemic heart disease: No    - History of congestive heart failure: No    - Pre-operative treatment with insulin: No    - Pre-operative creatinine >2 mg/dL: No      Review of Systems   Constitutional: Negative.  Negative for fatigue.   HENT: Negative.  Negative for congestion, postnasal drip, rhinorrhea and trouble swallowing.    Eyes: Negative.  Negative for visual disturbance.   Respiratory: Negative.  Negative for choking and shortness of breath.    Cardiovascular: Negative.  Negative for chest pain.   Gastrointestinal: Negative.    Endocrine: Negative.    Genitourinary: Negative.    Musculoskeletal:  Positive for arthralgias. Negative for back pain, myalgias and neck pain.   Skin: Negative.    Neurological:  Negative for dizziness and headaches.   Psychiatric/Behavioral: Negative.       Past Medical History   Past Medical History[1]  Past Surgical History[2]  Family History[3]  Social History[4]  Medications[5]  Allergies   Allergen Reactions    Other Allergic Rhinitis     Environmental     Objective   There were no vitals taken for this visit.    Physical Exam  Vitals and nursing note reviewed.   Constitutional:       Appearance: Normal appearance. He is well-developed. He is obese.   HENT:      Head: Normocephalic and atraumatic.      Right Ear: Tympanic membrane, ear canal and external ear normal. There is no impacted cerumen.      Left Ear: Tympanic membrane, ear canal and external ear normal. There is no impacted cerumen.      Nose: Nose normal.      Mouth/Throat:      Mouth: Mucous membranes are moist.      Pharynx: Oropharynx is clear.      Comments: One dental implant     Eyes:      Conjunctiva/sclera: Conjunctivae normal.       Cardiovascular:      Rate and Rhythm: Normal rate and  regular rhythm.      Pulses: Normal pulses.      Heart sounds: Normal heart sounds. No murmur heard.  Pulmonary:      Effort: Pulmonary effort is normal. No respiratory distress.      Breath sounds: Normal breath sounds.   Abdominal:      General: Bowel sounds are normal. There is no distension.      Palpations: Abdomen is soft. There is no mass.      Tenderness: There is no abdominal tenderness. There is no guarding or rebound.      Hernia: No hernia is present.     Musculoskeletal:         General: Normal range of motion.      Cervical back: Normal range of motion and neck supple.     Skin:     General: Skin is warm and dry.      Capillary Refill: Capillary refill takes less than 2 seconds.     Neurological:      General: No focal deficit present.      Mental Status: He is alert and oriented to person, place, and time. Mental status is at baseline.     Psychiatric:         Mood and Affect: Mood normal.         Behavior: Behavior normal.         Thought Content: Thought content normal.         Judgment: Judgment normal.           ANNITA Brewster         [1]   Past Medical History:  Diagnosis Date    Allergic rhinitis     BPH (benign prostatic hyperplasia)     GERD (gastroesophageal reflux disease)     HL (hearing loss)     Wears hearing aids    Hyperlipidemia     Inguinal hernia of left side with obstruction     Nephrolithiasis     Tinnitus    [2]   Past Surgical History:  Procedure Laterality Date    ADENOIDECTOMY      INGUINAL HERNIA REPAIR      RI RPR 1ST INGUN HRNA AGE 5 YRS/> REDUCIBLE Left 10/14/2021    Procedure: REPAIR HERNIA INGUINAL;  Surgeon: Juan Pereira MD;  Location: [facility];  Service: General    REPLACEMENT TOTAL KNEE Left 08/05/2024    TONSILLECTOMY      WISDOM TOOTH EXTRACTION     [3]   Family History  Problem Relation Name Age of Onset    Skin cancer Father     [4]   Social History  Tobacco Use    Smoking status: Never     Passive exposure: Never    Smokeless tobacco: Never   Vaping Use     Vaping status: Never Used   Substance and Sexual Activity    Alcohol use: No    Drug use: No    Sexual activity: Not Currently   [5]   Current Outpatient Medications on File Prior to Visit   Medication Sig    Calcium Carb-Cholecalciferol 600-800 MG-UNIT TABS Take by mouth daily     ezetimibe (ZETIA) 10 mg tablet Take 1 tablet (10 mg total) by mouth daily    fenofibrate 160 MG tablet Take 1 tablet (160 mg total) by mouth daily    Glucosamine-Chondroitin--400-375 MG TABS Take by mouth daily     ketoconazole (NIZORAL) 2 % shampoo Apply 1 Application topically 2 (two) times a week    multivitamin (THERAGRAN) TABS Take by mouth    omeprazole (PriLOSEC) 40 MG capsule Take 1 capsule (40 mg total) by mouth daily

## 2025-05-29 NOTE — TELEPHONE ENCOUNTER
Pt made aware        ----- Message from ANNITA Pratt sent at 5/29/2025 12:22 PM EDT -----  PSA elevated but within his baseline.  Correlates strongly with his prostate size of 74 g.  ----- Message -----  From: Lab, Background User  Sent: 5/29/2025  12:16 PM EDT  To: ANNITA Pratt

## 2025-05-30 ENCOUNTER — TELEPHONE (OUTPATIENT)
Age: 75
End: 2025-05-30

## 2025-05-30 ENCOUNTER — OFFICE VISIT (OUTPATIENT)
Dept: FAMILY MEDICINE CLINIC | Facility: CLINIC | Age: 75
End: 2025-05-30
Payer: MEDICARE

## 2025-05-30 VITALS
WEIGHT: 185.8 LBS | RESPIRATION RATE: 18 BRPM | HEIGHT: 67 IN | DIASTOLIC BLOOD PRESSURE: 78 MMHG | HEART RATE: 95 BPM | BODY MASS INDEX: 29.16 KG/M2 | OXYGEN SATURATION: 98 % | SYSTOLIC BLOOD PRESSURE: 138 MMHG | TEMPERATURE: 97.8 F

## 2025-05-30 DIAGNOSIS — E78.2 MIXED HYPERLIPIDEMIA: ICD-10-CM

## 2025-05-30 DIAGNOSIS — R35.1 BPH ASSOCIATED WITH NOCTURIA: ICD-10-CM

## 2025-05-30 DIAGNOSIS — K21.9 GASTROESOPHAGEAL REFLUX DISEASE, UNSPECIFIED WHETHER ESOPHAGITIS PRESENT: ICD-10-CM

## 2025-05-30 DIAGNOSIS — G89.29 CHRONIC PAIN OF LEFT KNEE: ICD-10-CM

## 2025-05-30 DIAGNOSIS — M25.562 CHRONIC PAIN OF LEFT KNEE: ICD-10-CM

## 2025-05-30 DIAGNOSIS — K21.9 GASTROESOPHAGEAL REFLUX DISEASE, UNSPECIFIED WHETHER ESOPHAGITIS PRESENT: Primary | ICD-10-CM

## 2025-05-30 DIAGNOSIS — R73.03 PREDIABETES: ICD-10-CM

## 2025-05-30 DIAGNOSIS — N40.1 BPH ASSOCIATED WITH NOCTURIA: ICD-10-CM

## 2025-05-30 DIAGNOSIS — Z01.818 PREOP EXAMINATION: ICD-10-CM

## 2025-05-30 DIAGNOSIS — R97.20 ELEVATED PSA: ICD-10-CM

## 2025-05-30 PROCEDURE — 93000 ELECTROCARDIOGRAM COMPLETE: CPT | Performed by: NURSE PRACTITIONER

## 2025-05-30 PROCEDURE — 99214 OFFICE O/P EST MOD 30 MIN: CPT | Performed by: NURSE PRACTITIONER

## 2025-05-30 PROCEDURE — G2211 COMPLEX E/M VISIT ADD ON: HCPCS | Performed by: NURSE PRACTITIONER

## 2025-05-30 RX ORDER — OMEPRAZOLE 40 MG/1
40 CAPSULE, DELAYED RELEASE ORAL DAILY
Qty: 30 CAPSULE | Refills: 5 | Status: SHIPPED | OUTPATIENT
Start: 2025-05-30

## 2025-05-30 NOTE — ASSESSMENT & PLAN NOTE
Component      Latest Ref Rng 3/7/2024   Cholesterol      See Comment mg/dL 167    Triglycerides      See Comment mg/dL 167 (H)    HDL      >=40 mg/dL 40    LDL Calculated      0 - 100 mg/dL 94    Non-HDL Cholesterol      mg/dl 127       Legend:  (H) High    The 10-year ASCVD risk score (Kory ARENAS, et al., 2019) is: 26.5%    Values used to calculate the score:      Age: 74 years      Clincally relevant sex: Male      Is Non- : No      Diabetic: No      Tobacco smoker: No      Systolic Blood Pressure: 138 mmHg      Is BP treated: No      HDL Cholesterol: 40 mg/dL      Total Cholesterol: 167 mg/dL       Patient continues on Zetia and fenofibrate.  Low-fat diet recommended.  Due for lipid panel

## 2025-05-30 NOTE — ASSESSMENT & PLAN NOTE
Lab Results   Component Value Date    HGBA1C 6.2 (H) 03/12/2025     Last A1c 6.2.  Low-carb diet recommended.  Exercise and weight loss recommended.  The patient has found it difficult to exercise due to knee pain.

## 2025-05-30 NOTE — TELEPHONE ENCOUNTER
Patient called the RX Refill Line. Message is being forwarded to the office.     Nataliia called again asking for EKG and clearance paperwork be faxed to both the [phone] and [phone] for his surgery is Monday     Please contact Nataliia at [phone], she would really appreciate a call back once it's been faxed so she knows to look out for it, she works til 4:30 pm today

## 2025-05-30 NOTE — ASSESSMENT & PLAN NOTE
Patient with previous left total knee replacement.  He is scheduled next week for a  revision of a left total knee arthroplasty.

## 2025-05-30 NOTE — TELEPHONE ENCOUNTER
[facility] [place] called to request clearance visit notes and EKG tracing to be faxed at     Fax# [phone]

## 2025-05-30 NOTE — ASSESSMENT & PLAN NOTE
Lab Results   Component Value Date    PSA 7.438 (H) 05/29/2025    PSA 6.57 (H) 03/07/2024    PSA 7.6 (H) 03/06/2023     Most recent PSA elevated however he is following with urology.  He did have an MRI of his prostate in the past.  Continue close surveillance with urology.

## 2025-05-30 NOTE — TELEPHONE ENCOUNTER
[facility] called and is asking for the EKG Tracing to be faxed over to them for surgery this up coming Monday 6/2/25. Fax number [phone].

## 2025-07-09 ENCOUNTER — EVALUATION (OUTPATIENT)
Dept: PHYSICAL THERAPY | Facility: REHABILITATION | Age: 75
End: 2025-07-09
Payer: MEDICARE

## 2025-07-09 DIAGNOSIS — Z96.652 PRESENCE OF LEFT ARTIFICIAL KNEE JOINT: Primary | ICD-10-CM

## 2025-07-09 PROCEDURE — 97161 PT EVAL LOW COMPLEX 20 MIN: CPT

## 2025-07-09 PROCEDURE — 97112 NEUROMUSCULAR REEDUCATION: CPT

## 2025-07-09 NOTE — LETTER
July 10, 2025    Guillermo Jacobo MD  [address] [place] [address]    Patient: Eddie Perez Jr.   YOB: 1950   Date of Visit: 2025     Encounter Diagnosis     ICD-10-CM    1. Presence of left artificial knee joint  Z96.652           Dear Dr. Guillermo Jacobo MD:    Thank you for your recent referral of Eddie Perez Jr.. Please review the attached evaluation summary from Eddie's recent visit.     Please verify that you agree with the plan of care by signing the attached order.     If you have any questions or concerns, please do not hesitate to call.     I sincerely appreciate the opportunity to share in the care of one of your patients and hope to have another opportunity to work with you in the near future.       Sincerely,    Rio Diaz, PT      Referring Provider:      I certify that I have read the below Plan of Care and certify the need for these services furnished under this plan of treatment while under my care.                    Guillermo Jacobo MD  [address] [address]  Via Fax: [phone]          PT Evaluation     Today's date: 2025  Patient name: Eddie Perez Jr.  : 1950  MRN: [id number]  Referring provider: No ref. provider found  Dx:   Encounter Diagnosis     ICD-10-CM    1. Presence of left artificial knee joint  Z96.652           Start Time: 1615  Stop Time: 1700  Total time in clinic (min): 45 minutes    Assessment  Impairments: abnormal gait, abnormal or restricted ROM, activity intolerance, impaired physical strength, lacks appropriate home exercise program, pain with function and weight-bearing intolerance  Symptom irritability: high    Assessment details: Eddie Perez Jr. is a 74 y.o. male who presents with L knee pain s/p L TKA revision 25. Upon eval today, he has poor LE strength, WB intolerance, poor L knee ROM and abnormal gait, resulting in pain with ADLs.  No further referral appears necessary at this time  based upon examination results. His strength and ROM testing directly reproduce his sx, and his sx descriptions are most closely associated with normal tissue end feels and muscle weakness. I expect he will exhibit sx improvement with skilled LE strengthening and progression of his knee ROM, but I believe his prognosis to be poor, given his unsuccessful previous trial of PT, high sx irritability, poor attitude surrounding exercise and therapy, and his previous lack of compliance with pre-established home exercise plans. I educated him on the findings of the exam and that he is going to need to begin to progress his strengthening and ROM at home. At this time we will trial 6 weeks of skilled PT, placing a strong emphasis on compliance and consistency with home exercise.    Comparable signs:  1) Activity    Problem List:  1) Poor LE strength  2) WB intolerance  3) Poor knee ROM  4) Abnormal gait  Understanding of Dx/Px/POC: good     Prognosis: poor    Goals  Impairment based goals  Patient will improve L LE strength to 4+/5 in all planes within 3 weeks in order to improve ease and stability with functional mobility.  Patient will improve L LE strength to 5/5 in all planes by time of d/c in order to improve ease and stability with functional mobility.  Patient will improve L knee ROM by 10* within 3 weeks in order to improve ease and stability with functional mobility.  Patient will improve L knee ROM to WFL by time of d/c in order to improve ease and stability with functional mobility.  Patient will report 50% reduction in pain within 3 weeks.  Patient will tolerate a treatment session centered around addressing deficits of strength and mobility with min c/o pain.      Functional based goals to be completed by time of d/c  Patient will improve FOTO to greater than predicted value.  Patient will be independent with home exercise program.   Patient will be able to manage symptoms independently.     Plan    Planned  "therapy interventions: abdominal trunk stabilization, activity modification, ADL training, balance/weight bearing training, gait training, home exercise program, therapeutic exercise, therapeutic activities, stretching, strengthening, patient education, neuromuscular re-education, postural training, therapeutic training, joint mobilization, IASTM, kinesiology taping, manual therapy, massage, De Dios taping, motor coordination training, muscle pump exercises, nerve gliding, self care, work reintegration, fine motor coordination training, flexibility, functional ROM exercises, graded activity, graded exercise, graded motor, IADL retraining, balance, behavior modification, body mechanics training, breathing training, transfer training and coordination    Frequency: 2x week  Duration in weeks: 12  Treatment plan discussed with: patient      Subjective Evaluation    History of Present Illness  Mechanism of injury: 2025: Eddie Perez  is a 74 y.o. male presenting to PT for L knee pain. He initially underwent TKA in , completing ~3 mo's of PT. He states it never got better. He states he was not doing any exercises at home. 25, he went in for a revision with Dr. Jacobo. Since then, he had home health PT, but has not been completing any exercises on his own. He has a stationary bike that he uses daily, but does not perform any passive stretching or strength training. He states he was given exercises but is \"lazy\" and doesn't do them. He denies sx at rest. His sx are reproducible with prolonged standing, walking and stairs.  Patient Goals  Patient goals for therapy: increased strength, independence with ADLs/IADLs, return to sport/leisure activities, decreased pain and increased motion    Pain  Current pain ratin  At best pain ratin  At worst pain ratin  Location: global L knee  Quality: dull ache  Aggravating factors: walking, stair climbing and standing  Progression: no change    Social " Support    Employment status: not working  Hand dominance: right          Objective  IMAGING:      LUMBAR SCREEN  Special questions: All negative    Sensation:   LEFT RIGHT   L1 Intact Intact   L2 Intact Intact   L3 Intact Intact   L4 Intact Intact   L5 Intact Intact   S1 Intact Intact   S2 Intact Intact     Reflexes:   LEFT RIGHT   Patellar (L3-L4) 1+ 1+   Achilles (S1-S2) 1+ 1+     Babinski: Negative  Jackson: Negative  Clonus: Negative     Palpation: TTP anterior L knee      Amb: insufficient knee flexion, L LE antalgia  Squatting: DNT P!    HIP:   AROM PROM STRENGTH    RIGHT LEFT RIGHT LEFT RIGHT: _/5 LEFT: _/5   FLX     5 4   ABD     4+ 4-   EXT     4+ 4-   ER         IR           KNEE:   AROM PROM STRENGTH    RIGHT LEFT RIGHT LEFT RIGHT: _/5 LEFT: _/5   FLX wnl 118  120 5 4+   EXT wnl -5  -3 5 3     ANKLE:   AROM PROM STRENGTH    RIGHT LEFT RIGHT LEFT RIGHT: _/5 LEFT: _/5   PF     5 5   DF     5 5       SPECIAL TESTS   RIGHT LEFT   ANTERIOR DRAWER     POSTERIOR DRAWER     VARUS     VALGUS     NELLY RODARTE     90/90 HAMSTRING       COMMENTS:           Precautions: Past Medical History[1]    Allergies[2]    POC expires Unit limit Auth Expiration date PT/OT + Visit Limit?   12 weeks - 10/1/2025            Visit/Unit Tracking  AUTH Status:  Date 7/9         BOMN Used 1         Remaining             Pertinent Findings:      POC End Date: 10/1/2025                                                                                    Test / Measure     FOTO (Predicted 62) 47   Poor LE strength    Poor WB tolerance    Nicole knee ROM        Access Code: [email or web address]  URL: [email or web address]  Date: 07/09/2025  Prepared by: Rio Diaz    Exercises  - Supine Heel Slide with Strap  - 1 x daily - 7 x weekly - 2 sets - 10 reps - 10 seconds hold  - Sidelying Hip Abduction  - 1 x daily - 3 x weekly - 2 sets - 10 reps  - Supine Active Straight Leg Raise  - 1 x daily - 3 x weekly - 2 sets  - 10 reps      Manuals 7/9                                                                Neuro Re-Ed             HEP review/pt education 25'            4 way SLR             Bridge                                                                  Ther Ex             NS             Heel slides             LAQ             Lat stepping             Leg press             TKE                                                    Ther Activity                                       Gait Training                                       Modalities                                                                   [1]  Past Medical History:  Diagnosis Date   • Allergic rhinitis    • BPH (benign prostatic hyperplasia)    • GERD (gastroesophageal reflux disease)    • HL (hearing loss)     Wears hearing aids   • Hyperlipidemia    • Inguinal hernia of left side with obstruction    • Nephrolithiasis    • Tinnitus    [2]  Allergies  Allergen Reactions   • Other Allergic Rhinitis     Environmental

## 2025-07-09 NOTE — PROGRESS NOTES
PT Evaluation     Today's date: 2025  Patient name: Eddie Perez Jr.  : 1950  MRN: [id number]  Referring provider: No ref. provider found  Dx:   Encounter Diagnosis     ICD-10-CM    1. Presence of left artificial knee joint  Z96.652           Start Time: 1615  Stop Time: 1700  Total time in clinic (min): 45 minutes    Assessment  Impairments: abnormal gait, abnormal or restricted ROM, activity intolerance, impaired physical strength, lacks appropriate home exercise program, pain with function and weight-bearing intolerance  Symptom irritability: high    Assessment details: Eddie Perez Jr. is a 74 y.o. male who presents with L knee pain s/p L TKA revision 25. Upon eval today, he has poor LE strength, WB intolerance, poor L knee ROM and abnormal gait, resulting in pain with ADLs.  No further referral appears necessary at this time based upon examination results. His strength and ROM testing directly reproduce his sx, and his sx descriptions are most closely associated with normal tissue end feels and muscle weakness. I expect he will exhibit sx improvement with skilled LE strengthening and progression of his knee ROM, but I believe his prognosis to be poor, given his unsuccessful previous trial of PT, high sx irritability, poor attitude surrounding exercise and therapy, and his previous lack of compliance with pre-established home exercise plans. I educated him on the findings of the exam and that he is going to need to begin to progress his strengthening and ROM at home. At this time we will trial 6 weeks of skilled PT, placing a strong emphasis on compliance and consistency with home exercise.    Comparable signs:  1) Activity    Problem List:  1) Poor LE strength  2) WB intolerance  3) Poor knee ROM  4) Abnormal gait  Understanding of Dx/Px/POC: good     Prognosis: poor    Goals  Impairment based goals  Patient will improve L LE strength to 4+/5 in all planes within 3 weeks in order  to improve ease and stability with functional mobility.  Patient will improve L LE strength to 5/5 in all planes by time of d/c in order to improve ease and stability with functional mobility.  Patient will improve L knee ROM by 10* within 3 weeks in order to improve ease and stability with functional mobility.  Patient will improve L knee ROM to WFL by time of d/c in order to improve ease and stability with functional mobility.  Patient will report 50% reduction in pain within 3 weeks.  Patient will tolerate a treatment session centered around addressing deficits of strength and mobility with min c/o pain.      Functional based goals to be completed by time of d/c  Patient will improve FOTO to greater than predicted value.  Patient will be independent with home exercise program.   Patient will be able to manage symptoms independently.     Plan    Planned therapy interventions: abdominal trunk stabilization, activity modification, ADL training, balance/weight bearing training, gait training, home exercise program, therapeutic exercise, therapeutic activities, stretching, strengthening, patient education, neuromuscular re-education, postural training, therapeutic training, joint mobilization, IASTM, kinesiology taping, manual therapy, massage, De Dios taping, motor coordination training, muscle pump exercises, nerve gliding, self care, work reintegration, fine motor coordination training, flexibility, functional ROM exercises, graded activity, graded exercise, graded motor, IADL retraining, balance, behavior modification, body mechanics training, breathing training, transfer training and coordination    Frequency: 2x week  Duration in weeks: 12  Treatment plan discussed with: patient      Subjective Evaluation    History of Present Illness  Mechanism of injury: 7/9/2025: Eddie HERNDON Timnicholedaquan Mclaughlin is a 74 y.o. male presenting to PT for L knee pain. He initially underwent TKA in 2024, completing ~3 mo's of PT. He  "states it never got better. He states he was not doing any exercises at home. 25, he went in for a revision with Dr. Jacobo. Since then, he had home health PT, but has not been completing any exercises on his own. He has a stationary bike that he uses daily, but does not perform any passive stretching or strength training. He states he was given exercises but is \"lazy\" and doesn't do them. He denies sx at rest. His sx are reproducible with prolonged standing, walking and stairs.  Patient Goals  Patient goals for therapy: increased strength, independence with ADLs/IADLs, return to sport/leisure activities, decreased pain and increased motion    Pain  Current pain ratin  At best pain ratin  At worst pain ratin  Location: global L knee  Quality: dull ache  Aggravating factors: walking, stair climbing and standing  Progression: no change    Social Support    Employment status: not working  Hand dominance: right          Objective  IMAGING:      LUMBAR SCREEN  Special questions: All negative    Sensation:   LEFT RIGHT   L1 Intact Intact   L2 Intact Intact   L3 Intact Intact   L4 Intact Intact   L5 Intact Intact   S1 Intact Intact   S2 Intact Intact     Reflexes:   LEFT RIGHT   Patellar (L3-L4) 1+ 1+   Achilles (S1-S2) 1+ 1+     Babinski: Negative  Jackson: Negative  Clonus: Negative     Palpation: TTP anterior L knee      Amb: insufficient knee flexion, L LE antalgia  Squatting: DNT P!    HIP:   AROM PROM STRENGTH    RIGHT LEFT RIGHT LEFT RIGHT: _/5 LEFT: _/5   FLX     5 4   ABD     4+ 4-   EXT     4+ 4-   ER         IR           KNEE:   AROM PROM STRENGTH    RIGHT LEFT RIGHT LEFT RIGHT: _/5 LEFT: _/5   FLX wnl 118  120 5 4+   EXT wnl -5  -3 5 3     ANKLE:   AROM PROM STRENGTH    RIGHT LEFT RIGHT LEFT RIGHT: _/5 LEFT: _/5   PF     5 5   DF     5 5       SPECIAL TESTS   RIGHT LEFT   ANTERIOR DRAWER     POSTERIOR DRAWER     VARUS     VALGUS     NELLY RODARTE     90/90 HAMSTRING  "      COMMENTS:           Precautions: Past Medical History[1]    Allergies[2]    POC expires Unit limit Auth Expiration date PT/OT + Visit Limit?   12 weeks - 10/1/2025            Visit/Unit Tracking  AUTH Status:  Date 7/9         BOMN Used 1         Remaining             Pertinent Findings:      POC End Date: 10/1/2025                                                                                    Test / Measure     FOTO (Predicted 62) 47   Poor LE strength    Poor WB tolerance    Nicole knee ROM        Access Code: [email or web address]  URL: [email or web address]  Date: 07/09/2025  Prepared by: Rio Diaz    Exercises  - Supine Heel Slide with Strap  - 1 x daily - 7 x weekly - 2 sets - 10 reps - 10 seconds hold  - Sidelying Hip Abduction  - 1 x daily - 3 x weekly - 2 sets - 10 reps  - Supine Active Straight Leg Raise  - 1 x daily - 3 x weekly - 2 sets - 10 reps      Manuals 7/9                                                                Neuro Re-Ed             HEP review/pt education 25'            4 way SLR             Bridge                                                                  Ther Ex             NS             Heel slides             LAQ             Lat stepping             Leg press             TKE                                                    Ther Activity                                       Gait Training                                       Modalities                                                 [1]   Past Medical History:  Diagnosis Date    Allergic rhinitis     BPH (benign prostatic hyperplasia)     GERD (gastroesophageal reflux disease)     HL (hearing loss)     Wears hearing aids    Hyperlipidemia     Inguinal hernia of left side with obstruction     Nephrolithiasis     Tinnitus    [2]   Allergies  Allergen Reactions    Other Allergic Rhinitis     Environmental

## 2025-07-14 ENCOUNTER — OFFICE VISIT (OUTPATIENT)
Dept: PHYSICAL THERAPY | Facility: REHABILITATION | Age: 75
End: 2025-07-14
Payer: MEDICARE

## 2025-07-14 DIAGNOSIS — Z96.652 PRESENCE OF LEFT ARTIFICIAL KNEE JOINT: Primary | ICD-10-CM

## 2025-07-14 PROCEDURE — 97110 THERAPEUTIC EXERCISES: CPT

## 2025-07-14 PROCEDURE — 97112 NEUROMUSCULAR REEDUCATION: CPT

## 2025-07-14 NOTE — PROGRESS NOTES
"Daily Note     Today's date: 2025  Patient name: Eddie Perez Jr.  : 1950  MRN: [id number]  Referring provider: No ref. provider found  Dx:   Encounter Diagnosis     ICD-10-CM    1. Presence of left artificial knee joint  Z96.652           Start Time: 1245  Stop Time: 1327  Total time in clinic (min): 42 minutes    Subjective: Patient states that his knee is in some pain today, but it varies depending on if he is using the knee a lot or a little.       Objective: See treatment diary below      Assessment: Tolerated treatment well. Patient exhibited good technique with therapeutic exercises and would benefit from continued PT. Pain levels varied during the session but stayed below a 5/10. Patient seen 1:1 with PTs for entirety of session.         Plan: Continue per plan of care.      Precautions: Past Medical History[1]    Allergies[2]    POC expires Unit limit Auth Expiration date PT/OT + Visit Limit?   12 weeks - 10/1/2025            Visit/Unit Tracking  AUTH Status:  Date         BOMN Used 1 2        Remaining             Pertinent Findings:      POC End Date: 10/1/2025                                                                                    Test / Measure     FOTO (Predicted 62) 47   Poor LE strength    Poor WB tolerance    Nicole knee ROM        Access Code: [email or web address]  URL: [email or web address]  Date: 2025  Prepared by: Rio Diaz    Exercises  - Supine Heel Slide with Strap  - 1 x daily - 7 x weekly - 2 sets - 10 reps - 10 seconds hold  - Sidelying Hip Abduction  - 1 x daily - 3 x weekly - 2 sets - 10 reps  - Supine Active Straight Leg Raise  - 1 x daily - 3 x weekly - 2 sets - 10 reps      Manuals                                                                Neuro Re-Ed             HEP review/pt education 25'            4 way SLR Supine HEP Supine 2x10            Bridge   2x10            BAPs   1' each            Step ups   6\" 15# KB x10     " "       Foam beam   Tandem  with 15# 4 laps    SS 4 laps            Hurdles   SS laps            Ther Ex             NS  5' lvl 5            Heel slides HEP            LAQ  4# 2x10            Lat stepping             Leg press  55# 25x DL  X10 SL           TKE  15#  x20            SL hip abd.  HEP 2x10            Mini squats  HHA x15            LSd  4\" LLE x25            Ther Activity                                       Gait Training                                       Modalities                                                    [1]   Past Medical History:  Diagnosis Date    Allergic rhinitis     BPH (benign prostatic hyperplasia)     GERD (gastroesophageal reflux disease)     HL (hearing loss)     Wears hearing aids    Hyperlipidemia     Inguinal hernia of left side with obstruction     Nephrolithiasis     Tinnitus    [2]   Allergies  Allergen Reactions    Other Allergic Rhinitis     Environmental     "

## 2025-07-16 ENCOUNTER — APPOINTMENT (OUTPATIENT)
Dept: LAB | Facility: CLINIC | Age: 75
End: 2025-07-16
Attending: FAMILY MEDICINE
Payer: MEDICARE

## 2025-07-16 DIAGNOSIS — T84.54XD INFECTION OF TOTAL LEFT KNEE REPLACEMENT, SUBSEQUENT ENCOUNTER: ICD-10-CM

## 2025-07-16 LAB
ALBUMIN SERPL BCG-MCNC: 4.1 G/DL (ref 3.5–5)
ALP SERPL-CCNC: 46 U/L (ref 34–104)
ALT SERPL W P-5'-P-CCNC: 6 U/L (ref 7–52)
ANION GAP SERPL CALCULATED.3IONS-SCNC: 13 MMOL/L (ref 4–13)
AST SERPL W P-5'-P-CCNC: 16 U/L (ref 13–39)
BASOPHILS # BLD AUTO: 0.04 THOUSANDS/ÂΜL (ref 0–0.1)
BASOPHILS NFR BLD AUTO: 1 % (ref 0–1)
BILIRUB DIRECT SERPL-MCNC: 0.09 MG/DL (ref 0–0.2)
BILIRUB SERPL-MCNC: 0.41 MG/DL (ref 0.2–1)
BUN SERPL-MCNC: 19 MG/DL (ref 5–25)
CALCIUM SERPL-MCNC: 9.6 MG/DL (ref 8.4–10.2)
CHLORIDE SERPL-SCNC: 103 MMOL/L (ref 96–108)
CHOLEST SERPL-MCNC: 175 MG/DL (ref ?–200)
CO2 SERPL-SCNC: 26 MMOL/L (ref 21–32)
CREAT SERPL-MCNC: 1.08 MG/DL (ref 0.6–1.3)
CRP SERPL QL: 23.8 MG/L
EOSINOPHIL # BLD AUTO: 0.23 THOUSAND/ÂΜL (ref 0–0.61)
EOSINOPHIL NFR BLD AUTO: 4 % (ref 0–6)
ERYTHROCYTE [DISTWIDTH] IN BLOOD BY AUTOMATED COUNT: 13.6 % (ref 11.6–15.1)
GFR SERPL CREATININE-BSD FRML MDRD: 67 ML/MIN/1.73SQ M
GLUCOSE P FAST SERPL-MCNC: 89 MG/DL (ref 65–99)
HCT VFR BLD AUTO: 41 % (ref 36.5–49.3)
HDLC SERPL-MCNC: 37 MG/DL
HGB BLD-MCNC: 13 G/DL (ref 12–17)
IMM GRANULOCYTES # BLD AUTO: 0.01 THOUSAND/UL (ref 0–0.2)
IMM GRANULOCYTES NFR BLD AUTO: 0 % (ref 0–2)
LDLC SERPL CALC-MCNC: 102 MG/DL (ref 0–100)
LYMPHOCYTES # BLD AUTO: 2.16 THOUSANDS/ÂΜL (ref 0.6–4.47)
LYMPHOCYTES NFR BLD AUTO: 33 % (ref 14–44)
MCH RBC QN AUTO: 29.1 PG (ref 26.8–34.3)
MCHC RBC AUTO-ENTMCNC: 31.7 G/DL (ref 31.4–37.4)
MCV RBC AUTO: 92 FL (ref 82–98)
MONOCYTES # BLD AUTO: 0.76 THOUSAND/ÂΜL (ref 0.17–1.22)
MONOCYTES NFR BLD AUTO: 11 % (ref 4–12)
NEUTROPHILS # BLD AUTO: 3.44 THOUSANDS/ÂΜL (ref 1.85–7.62)
NEUTS SEG NFR BLD AUTO: 51 % (ref 43–75)
NONHDLC SERPL-MCNC: 138 MG/DL
NRBC BLD AUTO-RTO: 0 /100 WBCS
PLATELET # BLD AUTO: 288 THOUSANDS/UL (ref 149–390)
PMV BLD AUTO: 12.2 FL (ref 8.9–12.7)
POTASSIUM SERPL-SCNC: 3.9 MMOL/L (ref 3.5–5.3)
PROT SERPL-MCNC: 7.6 G/DL (ref 6.4–8.4)
RBC # BLD AUTO: 4.46 MILLION/UL (ref 3.88–5.62)
SODIUM SERPL-SCNC: 142 MMOL/L (ref 135–147)
TRIGL SERPL-MCNC: 181 MG/DL (ref ?–150)
WBC # BLD AUTO: 6.64 THOUSAND/UL (ref 4.31–10.16)

## 2025-07-16 PROCEDURE — 82248 BILIRUBIN DIRECT: CPT

## 2025-07-16 PROCEDURE — 86140 C-REACTIVE PROTEIN: CPT

## 2025-07-16 PROCEDURE — 85025 COMPLETE CBC W/AUTO DIFF WBC: CPT

## 2025-07-16 PROCEDURE — 36415 COLL VENOUS BLD VENIPUNCTURE: CPT

## 2025-07-16 PROCEDURE — 80053 COMPREHEN METABOLIC PANEL: CPT

## 2025-07-16 PROCEDURE — 80061 LIPID PANEL: CPT

## 2025-07-17 ENCOUNTER — OFFICE VISIT (OUTPATIENT)
Dept: PHYSICAL THERAPY | Facility: REHABILITATION | Age: 75
End: 2025-07-17
Payer: MEDICARE

## 2025-07-17 DIAGNOSIS — Z96.652 PRESENCE OF LEFT ARTIFICIAL KNEE JOINT: Primary | ICD-10-CM

## 2025-07-17 PROCEDURE — 97112 NEUROMUSCULAR REEDUCATION: CPT

## 2025-07-17 PROCEDURE — 97110 THERAPEUTIC EXERCISES: CPT

## 2025-07-17 NOTE — PROGRESS NOTES
"Daily Note     Today's date: 2025  Patient name: Eddie Perez Jr.  : 1950  MRN: [id number]  Referring provider: No ref. provider found  Dx:   Encounter Diagnosis     ICD-10-CM    1. Presence of left artificial knee joint  Z96.652           Start Time: 1430  Stop Time: 1512  Total time in clinic (min): 42 minutes    Subjective: Patient reports his knee feeling stiff today.       Objective: See treatment diary below      Assessment: Tolerated treatment fair. Patient exhibited good technique with therapeutic exercises and would benefit from continued PT. Patient notes knee pain during the session ranging around 5/10. Patient seen 1:1 with PT from 230p-253p portion of session.         Plan: Continue per plan of care.      Precautions: Past Medical History[1]    Allergies[2]    POC expires Unit limit Auth Expiration date PT/OT + Visit Limit?   12 weeks - 10/1/2025            Visit/Unit Tracking  AUTH Status:  Date         BOMN Used 1 2        Remaining             Pertinent Findings:      POC End Date: 10/1/2025                                                                                    Test / Measure     FOTO (Predicted 62) 47   Poor LE strength    Poor WB tolerance    Nicole knee ROM        Access Code: [email or web address]  URL: [email or web address]  Date: 2025  Prepared by: Rio Diaz    Exercises  - Supine Heel Slide with Strap  - 1 x daily - 7 x weekly - 2 sets - 10 reps - 10 seconds hold  - Sidelying Hip Abduction  - 1 x daily - 3 x weekly - 2 sets - 10 reps  - Supine Active Straight Leg Raise  - 1 x daily - 3 x weekly - 2 sets - 10 reps      Manuals                                                               Neuro Re-Ed             HEP review/pt education 25'            biodex   3'           4 way SLR Supine HEP Supine 2x10  Flexion and abd. 2x10           Bridge   2x10  2x10           BAPs   1' each  1' each           Step ups   6\" 15# KB x10  6\" 15# " "KB x10           slider   Ob bl tband x10 each           Foam beam   Tandem  with 15# 4 laps    SS 4 laps  Tandem  with 4 laps    SS 4 laps           Hurdles   SS laps  SS laps 4 laps           Ther Ex             NS  5' lvl 5  RB 7'           Heel slides HEP            LAQ  4# 2x10  4# 2x10           Lat stepping             Leg press  55# 25x DL  X10 SL           TKE  15#  x20  15# x20           SL hip abd.  HEP 2x10  2x10           Mini squats  HHA x15  15x HHA          LSd  4\" LLE x25  4\" LLE x25           Ther Activity                                       Gait Training                                       Modalities                                                       [1]   Past Medical History:  Diagnosis Date    Allergic rhinitis     BPH (benign prostatic hyperplasia)     GERD (gastroesophageal reflux disease)     HL (hearing loss)     Wears hearing aids    Hyperlipidemia     Inguinal hernia of left side with obstruction     Nephrolithiasis     Tinnitus    [2]   Allergies  Allergen Reactions    Other Allergic Rhinitis     Environmental     "

## 2025-07-22 ENCOUNTER — OFFICE VISIT (OUTPATIENT)
Dept: PHYSICAL THERAPY | Facility: REHABILITATION | Age: 75
End: 2025-07-22
Payer: MEDICARE

## 2025-07-22 ENCOUNTER — OFFICE VISIT (OUTPATIENT)
Dept: OBGYN CLINIC | Facility: CLINIC | Age: 75
End: 2025-07-22
Payer: MEDICARE

## 2025-07-22 VITALS — WEIGHT: 185 LBS | HEIGHT: 67 IN | BODY MASS INDEX: 29.03 KG/M2

## 2025-07-22 DIAGNOSIS — M76.822 POSTERIOR TIBIAL TENDON DYSFUNCTION (PTTD) OF LEFT LOWER EXTREMITY: Primary | ICD-10-CM

## 2025-07-22 DIAGNOSIS — Z96.652 PRESENCE OF LEFT ARTIFICIAL KNEE JOINT: Primary | ICD-10-CM

## 2025-07-22 PROCEDURE — 97112 NEUROMUSCULAR REEDUCATION: CPT

## 2025-07-22 PROCEDURE — 99213 OFFICE O/P EST LOW 20 MIN: CPT | Performed by: ORTHOPAEDIC SURGERY

## 2025-07-22 PROCEDURE — 97110 THERAPEUTIC EXERCISES: CPT

## 2025-07-22 NOTE — PROGRESS NOTES
Daily Note     Today's date: 2025  Patient name: Eddie Perez Jr.  : 1950  MRN: [id number]  Referring provider: Guillermo Jacobo MD  Dx:   Encounter Diagnosis     ICD-10-CM    1. Presence of left artificial knee joint  Z96.652           Start Time: 845  Stop Time: 926  Total time in clinic (min): 41 minutes    Subjective: Patient states that his knee is doing well, but his ankle/low arch is in a lot of pain today which might limit his knee exercises. He presents to his session with a single crutch as per ankle/foot pain. Patient states he is doing to the doctor this morning to discuss his ankle pain.       Objective: See treatment diary below      Assessment: Tolerated treatment well. Patient exhibited good technique with therapeutic exercises and would benefit from continued PT. Patient performed no WB exercises as per increased and intolerable ankle pain.        Plan: Continue per plan of care.      Precautions: Past Medical History[1]    Allergies[2]    POC expires Unit limit Auth Expiration date PT/OT + Visit Limit?   12 weeks - 10/1/2025            Visit/Unit Tracking  AUTH Status:  Date        BOMN Used 1 2 4       Remaining             Pertinent Findings:      POC End Date: 10/1/2025                                                                                    Test / Measure     FOTO (Predicted 62) 47   Poor LE strength    Poor WB tolerance    Nicole knee ROM        Access Code: [email or web address]  URL: [email or web address]  Date: 2025  Prepared by: Rio Diaz    Exercises  - Supine Heel Slide with Strap  - 1 x daily - 7 x weekly - 2 sets - 10 reps - 10 seconds hold  - Sidelying Hip Abduction  - 1 x daily - 3 x weekly - 2 sets - 10 reps  - Supine Active Straight Leg Raise  - 1 x daily - 3 x weekly - 2 sets - 10 reps      Manuals                                                              Neuro Re-Ed             HEP review/pt education 25'   "          biodex   3'           4 way SLR Supine HEP Supine 2x10  Flexion and abd. 2x10  Supine flexion 3x10            Bridge   2x10  2x10  2x10          BAPs   1' each  1' each           Step ups   6\" 15# KB x10  6\" 15# KB x10           slider   Ob bl tband x10 each           Foam beam   Tandem  with 15# 4 laps    SS 4 laps  Tandem  with 4 laps    SS 4 laps           Hurdles   SS laps  SS laps 4 laps           Ther Ex             NS  5' lvl 5  RB 7'  RB 7' warm up   RB 5' cool down          Heel slides HEP            LAQ  4# 2x10  4# 2x10  4# 2x10 each          Lat stepping             Clamshells     2x10 OTB         Leg press  55# 25x DL  X10 SL           TKE  15#  x20  15# x20           SL hip abd.  HEP 2x10  2x10  2x10 1x5          Mini squats  HHA x15  15x HHA          LSd  4\" LLE x25  4\" LLE x25           DF    YTB 2x10 L ankle         L ankle inversion    YTB 2x10 L ankle          DKTC     2x10          Ther Activity                                       Gait Training                                       Modalities                                                        [1]   Past Medical History:  Diagnosis Date    Allergic rhinitis     BPH (benign prostatic hyperplasia)     GERD (gastroesophageal reflux disease)     HL (hearing loss)     Wears hearing aids    Hyperlipidemia     Inguinal hernia of left side with obstruction     Nephrolithiasis     Tinnitus    [2]   Allergies  Allergen Reactions    Other Allergic Rhinitis     Environmental     "

## 2025-07-22 NOTE — PATIENT INSTRUCTIONS
Flatfoot Surgical Correction     What is adult flatfoot?  Adult flatfoot is a condition that causes flattening of the arch of the foot.       X-ray views of a flatfoot before and after surgery. This patient had a first tarsal-metatarsal fusion, a medializing calcaneal osteotomy and a lateral column lengthening.            What are the goals of flatfoot surgical correction?  The goal of surgical correction is to improve alignment of the foot. This allows for more normal pressures during standing and walking. A combination of procedures is performed to repair the ligaments and tendons that support the arch. Bone cuts are often made to help restore the arch. Proper correction of flatfoot deformity can often help to improve pain and walking ability.      What signs indicate surgery may be needed?  Patients with flatfoot frequently describe ankle pain and difficulty with daily activities. Surgical reconstruction of the flatfoot is performed in patients with an arch collapse that is still flexible (not stiff). An orthopaedic foot and ankle surgeon should do a complete evaluation of the foot. This includes a medical history, physical exam and  X-rays. A trial of non-operative treatment should be completed prior to any decision to have surgery. Treatments can include rest, immobilization, shoe inserts, braces and physical therapy. If these are unsuccessful, surgery can be considered.     When should I avoid surgery?  Patients who have diabetes or take oral steroids should be evaluated by their primary care physician. These conditions may prevent you from being able to safely have surgery. Obese patients and smokers are at higher risk for blood clots and wound problems. Full recovery from flatfoot surgery can take up to a year. Patients who are unable or unwilling to complete this process should not have this surgery.     General Details of Procedure  A combination of surgical procedures can be used to reconstruct the  flatfoot. Generally, these procedures can be  into those that correct deformities of the bones and those that repair ligaments and tendons. Your orthopaedic surgeon will choose the proper combination of procedures for your foot.     Surgery of the foot can be performed under regional anesthesia, which is numbing the foot and ankle with a nerve or spinal block, or general anesthesia, which may require a breathing tube. A nerve block is often placed behind the knee to reduce pain after surgery.     Specific Techniques  Medializing Calcaneal Osteotomy  A medializing calcaneal osteotomy (heel slide) procedure is often used when the calcaneus (heel bone) has shifted out from underneath the leg. An incision is made on the outside of the heel, and the back half of the heel bone is cut and slid back underneath the leg. The heel is then fixed in place using metal screws or a plate.     Lateral Column Lengthening  Outward rotation of the foot may occur in patients with flatfoot. A lateral column lengthening procedure is sometimes performed for these patients. An incision is made on the outside of the foot, and the front half of the heel bone is cut. A bone wedge is then placed into the cut area of the heel bone. This wedge helps to “lengthen” the heel bone and rotate the foot back into its correct position. The wedge is usually kept in place using screws or a plate. The wedge can be taken from a cadaver or from a patient’s own hip.     Medial Cuneiform Dorsal Opening Wedge Osteotomy or First Tarsal-Metatarsal Fusion  Arch collapse can lead to the big toe side of the foot being raised above the ground. Your surgeon may perform a dorsal       X-ray views of a flatfoot before and after surgery. This patient had a first tarsal-metatarsal fusion, a medializing calcaneal osteotomy and a lateral column lengthening.     opening wedge osteotomy of the medial cuneiform bone to treat this problem. An alternative is to perform a  first tarsal-metatarsal joint fusion. Both procedures involve an incision over the top of the foot. In the case of the dorsal opening wedge osteotomy, a bone wedge is placed into the top portion of the bone to push it down toward the floor. In the case of the fusion, the bone is pushed down toward the floor at the level of a joint in the middle of the foot and the bones are fused into that position. Screws or a plate can be used to keep the wedge in place or to fuse the joint.     Tendon and Ligament Procedures  The posterior tibial tendon runs underneath the arch of the foot. It is often stretched and dysfunctional in patients with flatfoot. The tendon often requires removal if it is thickened or torn. Usually the tendon that bends the little toes can be transferred (rerouted) to help support the arch. The stresses placed on the flatfoot can lead to tearing of the ligaments that support the arch (spring ligament) and the inside of the ankle (deltoid ligament). Your surgeon may decide to repair these structures if significant damage has been done. Finally, the flatfoot condition is often associated with tightness of the Achilles tendon. This can be treated using a lengthening procedure to stretch the muscle fibers of the calf.      Double or Triple Arthrodesis  In the later stages of flatfoot, deformities are frequently inflexible (stiff). Arthritis of the foot may be present as well. Surgical correction of these severe cases requires fusion of one or more of the foot joints. This procedure is referred to as a double or triple arthrodesis depending on the number of joints fused. For more information, see the Triple Arthrodesis page.     What happens after surgery?  Patients may go home the day of surgery or they may require an overnight hospital stay. The leg will be placed in a splint or cast and should be kept elevated for the first two weeks. At that point, sutures are removed. A new cast or a removable boot is  then placed. It is important that patients do not put any weight on the corrected foot for six to eight weeks following the operation. Patients may begin bearing weight at eight weeks and usually progress to full weightbearing by 10 to 12 weeks. For some patients, weightbearing requires additional time. After 12 weeks, patients commonly can transition to wearing a shoe. Inserts and ankle braces are often used. Physical therapy may be recommended.     Potential Complications  There are complications that relate to surgery in general. These include the risks associated with anesthesia, infection, damage to nerves and blood vessels, and bleeding or blood clots.     Complications following flatfoot surgery may include wound breakdown or nonunion (incomplete healing of the bones). These complications often can be prevented with proper wound care and rehabilitation. Occasionally, patients may notice some discomfort due to prominent hardware. Removal of hardware can be done at a later time if this is an issue. The overall complication rates for flatfoot surgery are low.     Frequently Asked Questions  Will surgical correction of my flatfoot improve the cosmetic appearance of my foot?  Surgical correction of flatfoot is aimed primarily at reducing pain and restoring function. Although surgery will likely improve the cosmetic appearance of the foot, it is not one of the primary goals of treatment.     What activities will I be able to do following flatfoot surgery?  With proper correction and rehabilitation, many patients return to active lifestyles. Activities such as walking, biking, driving and even golfing are well tolerated. It is less likely, however, that patients will be able to participate in very strenuous activities requiring running, cutting or jumping.       [employer], [employer], [employer] are good brands but I recommend going to a dedicate shoe store (not [employer] or [employer].) At these types of stores, they have  experts that can fit you for shoes appropriate for your foot problem. Shoe choice is essential to solving/improving most types of foot pain.  Even after a surgery, good shoes are necessary to keep the foot as comfortable as possible.    [employer]  [address] [place] [address]  [phone]    [employer]  [address], [place] [address]  [phone]    [employer]  [address] [place] [address]  [phone]    [employer]   [address]  [phone]    [employer]  [address], [place] [address] [phone]    [employer]  [address] [place], [place] [id number] [phone]    [facility] [employer]   [address] [place] [address]  [phone]    [employer]  [address] [place], [place]  [phone]    [employer] [facility]  [address] [facility], [address] [phone]    [employer] [address], [place] [address] [phone]          Today, We recommended a brace/prosthesis for you. [facility] certified pedorthotists make orthotics but not braces or prosthesis.  Below are the companies in the area that make these, Please call ahead for an appointment and to ensure the company accepts your insurance. Make sure to bring the prescription given to you in the office today to the company for the brace/prosthesis.    [facility]  [address] [place] [address]  Directions  [place] Phone: [phone]  [place] Fax: [phone]    [address]  [address]  [place] [address]  (By Appointment Only)  Directions  [address]  [address]  [place] [address]  Directions  [place] Phone: [phone]  [place] Fax: [phone]    [facility]    [place] Location  [address]  [facility] [id number] [phone] (fax)    [place] Location  [address]  [place] [address]  [phone] (fax)    [place] Location  [address]  [place] [address] [phone] (fax)    [facility] Location  [address]  [facility] [address]  [phone] (fax)    [place] Location  [address]  [place] [address]  [phone] (fax)

## 2025-07-22 NOTE — PROGRESS NOTES
James R Lachman, M.D.  Attending, Orthopaedic Surgery  Foot and Ankle  [facility]      ORTHOPAEDIC FOOT AND ANKLE CLINIC VISIT     Assessment and Plan     Assessment & Plan  Posterior tibial tendon dysfunction (PTTD) of left lower extremity  Patient has Stage IV PTTD of the left lower extremity.   He has obtained the [place] brace but stated that this made his pain worse but it appears that he may require some modifications to his brace to get the intended benefit. He was instructed to contact the  to make these modifications. He understood and all questions were answered  He is currently in treatments for an infection about his left total knee with a 6 month regimen of oral antibiotics. This revision total knee and treatment was performed on 6/2/25 by Dr Jacobo at [facility].   We would not perform an elective surgical procedure for his foot/ankle, which would be a double arthrodesis, until he is off of antibiotics and proves he is clear of the infection.   He also has a slight tilt of his talus which may require a total ankle arthroplasty in the future but we would perform the double arthrodesis first and continue to monitor this tilt in his talus.   Return to clinic on an as needed basis        History of Present Illness:   Chief Complaint:   Chief Complaint   Patient presents with    Follow-up     Follow up brace check. The brace makes it worse when he wears it. Wants to talk about his options.      Eddie HERNDON Chris White. is a 74 y.o. male who is being seen in follow-up for left foot/ankle. When we last saw he we recommended use of [place] brace.  Pain has not improved. Residual pain is localized at medial ankle with minimal radiating and described as sharp and severe.      Pain/symptom timing:  Worse during the day when active  Pain/symptom context:  Worse with activites and work  Pain/symptom modifying factors:  Rest makes better, activities make worse  Pain/symptom  "associated signs/symptoms: none    Prior treatment   NSAIDsYes   Injections No   Bracing/Orthotics Yes    Physical Therapy Yes     Orthopedic Surgical History:   See below    Past Medical, Surgical and Social History:  Past Medical History:  has a past medical history of Allergic rhinitis, BPH (benign prostatic hyperplasia), GERD (gastroesophageal reflux disease), HL (hearing loss), Hyperlipidemia, Inguinal hernia of left side with obstruction, Nephrolithiasis, and Tinnitus.  Problem List: does not have any pertinent problems on file.  Past Surgical History:  has a past surgical history that includes Tonsillectomy; ADENOIDECTOMY; Inguinal hernia repair; [place] tooth extraction; pr rpr 1st ingun hrna age 5 yrs/> reducible (Left, 10/14/2021); and Replacement total knee (Left, 08/05/2024).  Family History: family history includes Skin cancer in his father.  Social History:  reports that he has never smoked. He has never been exposed to tobacco smoke. He has never used smokeless tobacco. He reports that he does not drink alcohol and does not use drugs.  Current Medications: has a current medication list which includes the following prescription(s): calcium carb-cholecalciferol, diclofenac sodium, ezetimibe, fenofibrate, glucosamine-chondroitin-msm, ketoconazole, multivitamin, and omeprazole.  Allergies: is allergic to other.     Review of Systems:  General- denies fever/chills  HEENT- denies hearing loss or sore throat  Eyes- denies eye pain or visual disturbances, denies red eyes  Respiratory- denies cough or SOB  Cardio- denies chest pain or palpitations  GI- denies abdominal pain  Endocrine- denies urinary frequency  Urinary- denies pain with urination  Musculoskeletal- Negative except noted above  Skin- denies rashes or wounds  Neurological- denies dizziness or headache  Psychiatric- denies anxiety or difficulty concentrating    Physical Exam:   Ht 5' 7\" (1.702 m)   Wt 83.9 kg (185 lb)   BMI 28.98 kg/m² "   General/Constitutional: No apparent distress: well-nourished and well developed.  Eyes: normal ocular motion  Lymphatic: No appreciable lymphadenopathy  Respiratory: Non-labored breathing  Vascular: No edema, swelling or tenderness, except as noted in detailed exam.  Integumentary: No impressive skin lesions present, except as noted in detailed exam.  Neuro: No ataxia or tremors noted  Psych: Normal mood and affect, oriented to person, place and time. Appropriate affect.  Musculoskeletal: Normal, except as noted in detailed exam and in HPI.    Examination    Left    Gait Antalgic   Musculoskeletal Tender to palpation at posterior tibial tendon    Skin Normal.    Nails Normal    Range of Motion  20 degrees dorsiflexion, 30 degrees plantarflexion  Subtalar motion: normal    Stability Stable    Muscle Strength 5/5 tibialis anterior  5/5 gastrocnemius-soleus  4/5 posterior tibialis  5/5 peroneal/eversion strength  5/5 EHL  5/5 FHL    Neurologic Normal    Sensation Intact to light touch throughout sural, saphenous, superficial peroneal, deep peroneal and medial/lateral plantar nerve distributions.  [place]-Nava 5.07 filament (10g) testing deferred.    Cardiovascular Brisk capillary refill < 2 seconds,intact DP and PT pulses    Special Tests None      Imaging Studies:   No new imaging    James R. Lachman, MD  Foot & Ankle Surgery   Department of Orthopaedic Surgery  [facility]      I personally performed the service.    James R. Lachman, MD    Scribe Attestation      I,:  Samuel Escobedo am acting as a scribe while in the presence of the attending physician.:       I,:  James R Lachman, MD personally performed the services described in this documentation    as scribed in my presence.:

## 2025-07-22 NOTE — ASSESSMENT & PLAN NOTE
Patient has Stage IV PTTD of the left lower extremity.   He has obtained the [place] brace but stated that this made his pain worse but it appears that he may require some modifications to his brace to get the intended benefit. He was instructed to contact the  to make these modifications. He understood and all questions were answered  He is currently in treatments for an infection about his left total knee with a 6 month regimen of oral antibiotics. This revision total knee and treatment was performed on 6/2/25 by Dr Jacobo at [facility].   We would not perform an elective surgical procedure for his foot/ankle, which would be a double arthrodesis, until he is off of antibiotics and proves he is clear of the infection.   He also has a slight tilt of his talus which may require a total ankle arthroplasty in the future but we would perform the double arthrodesis first and continue to monitor this tilt in his talus.   Return to clinic on an as needed basis

## 2025-07-25 ENCOUNTER — OFFICE VISIT (OUTPATIENT)
Dept: PHYSICAL THERAPY | Facility: REHABILITATION | Age: 75
End: 2025-07-25
Payer: MEDICARE

## 2025-07-25 DIAGNOSIS — Z96.652 PRESENCE OF LEFT ARTIFICIAL KNEE JOINT: Primary | ICD-10-CM

## 2025-07-25 PROCEDURE — 97112 NEUROMUSCULAR REEDUCATION: CPT

## 2025-07-25 PROCEDURE — 97110 THERAPEUTIC EXERCISES: CPT

## 2025-07-25 NOTE — PROGRESS NOTES
Daily Note     Today's date: 2025  Patient name: Eddie Perez Jr.  : 1950  MRN: [id number]  Referring provider: Guillermo Jacobo MD  Dx:   Encounter Diagnosis     ICD-10-CM    1. Presence of left artificial knee joint  Z96.652           Start Time: 1415  Stop Time: 1455  Total time in clinic (min): 40 minutes    Subjective: Patient states that his ankle is feeling better. States he has to change his ankle brace.       Objective: See treatment diary below      Assessment: Tolerated treatment fair. Patient exhibited good technique with therapeutic exercises and would benefit from continued PT. Tolerated treatment better than LV due to increased tolerance with WB exercises. Encouraged to rest over the weekend to allow for the ankle to feel heal. Patient seen 1:1 with PT from 215p-238p portion entirety of session.         Plan: Continue per plan of care.      Precautions: Past Medical History[1]    Allergies[2]    POC expires Unit limit Auth Expiration date PT/OT + Visit Limit?   12 weeks - 10/1/2025            Visit/Unit Tracking  AUTH Status:  Date      [facility] Used 1 2 4 5      Remaining             Pertinent Findings:      POC End Date: 10/1/2025                                                                                    Test / Measure     FOTO (Predicted 62) 47   Poor LE strength    Poor WB tolerance    Nicole knee ROM        Access Code: [email or web address]  URL: [email or web address]  Date: 2025  Prepared by: Rio Diaz    Exercises  - Supine Heel Slide with Strap  - 1 x daily - 7 x weekly - 2 sets - 10 reps - 10 seconds hold  - Sidelying Hip Abduction  - 1 x daily - 3 x weekly - 2 sets - 10 reps  - Supine Active Straight Leg Raise  - 1 x daily - 3 x weekly - 2 sets - 10 reps      Manuals                                                             Neuro Re-Ed             HEP review/pt education 25'            biodex   3'           4 way  "SLR Supine HEP Supine 2x10  Flexion and abd. 2x10  Supine flexion 3x10    SLR 2x10         Bridge   2x10  2x10  2x10  3x10        BAPs   1' each  1' each   1' each        Step ups   6\" 15# KB x10  6\" 15# KB x10           slider   Ob bl tband x10 each           Foam beam   Tandem  with 15# 4 laps    SS 4 laps  Tandem  with 4 laps    SS 4 laps   Tandem  with 4 laps    SS 4 laps         biodex     4'        Hurdles   SS laps  SS laps 4 laps           Ther Ex             NS  5' lvl 5  RB 7'  RB 7' warm up   RB 5' cool down  RB 7' warm up         Heel slides HEP            LAQ  4# 2x10  4# 2x10  4# 2x10 each  4# 3x10 each         Lat stepping             Clamshells     2x10 OTB 2x10 OTB        Leg press  55# 25x DL  X10 SL           TKE  15#  x20  15# x20           SL hip abd.  HEP 2x10  2x10  2x10 1x5  2x10         Mini squats  HHA x15  15x HHA  X21 HHA        LSd  4\" LLE x25  4\" LLE x25           DF    YTB 2x10 L ankle         L ankle inversion    YTB 2x10 L ankle          Leg press     85# DL 4x10   40# SL 1x10         DKTC     2x10          Ther Activity                                       Gait Training                                       Modalities                                                             [1]   Past Medical History:  Diagnosis Date    Allergic rhinitis     BPH (benign prostatic hyperplasia)     GERD (gastroesophageal reflux disease)     HL (hearing loss)     Wears hearing aids    Hyperlipidemia     Inguinal hernia of left side with obstruction     Nephrolithiasis     Tinnitus    [2]   Allergies  Allergen Reactions    Other Allergic Rhinitis     Environmental     "

## 2025-07-30 ENCOUNTER — APPOINTMENT (OUTPATIENT)
Dept: PHYSICAL THERAPY | Facility: REHABILITATION | Age: 75
End: 2025-07-30
Payer: MEDICARE

## 2025-07-31 ENCOUNTER — APPOINTMENT (OUTPATIENT)
Dept: LAB | Facility: CLINIC | Age: 75
End: 2025-07-31
Attending: FAMILY MEDICINE
Payer: MEDICARE

## 2025-07-31 ENCOUNTER — HOSPITAL ENCOUNTER (OUTPATIENT)
Dept: RADIOLOGY | Facility: HOSPITAL | Age: 75
Discharge: HOME | End: 2025-07-31
Payer: MEDICARE

## 2025-07-31 ENCOUNTER — TRANSCRIBE ORDERS (OUTPATIENT)
Dept: LAB | Facility: CLINIC | Age: 75
End: 2025-07-31

## 2025-07-31 ENCOUNTER — OFFICE VISIT (OUTPATIENT)
Dept: FAMILY MEDICINE CLINIC | Facility: CLINIC | Age: 75
End: 2025-07-31
Payer: MEDICARE

## 2025-07-31 VITALS
HEART RATE: 96 BPM | DIASTOLIC BLOOD PRESSURE: 82 MMHG | HEIGHT: 67 IN | SYSTOLIC BLOOD PRESSURE: 120 MMHG | TEMPERATURE: 97.7 F | BODY MASS INDEX: 27.47 KG/M2 | OXYGEN SATURATION: 98 % | RESPIRATION RATE: 18 BRPM | WEIGHT: 175 LBS

## 2025-07-31 DIAGNOSIS — R05.1 ACUTE COUGH: ICD-10-CM

## 2025-07-31 DIAGNOSIS — J30.9 ALLERGIC RHINITIS, UNSPECIFIED SEASONALITY, UNSPECIFIED TRIGGER: ICD-10-CM

## 2025-07-31 DIAGNOSIS — Z96.652 PRESENCE OF LEFT ARTIFICIAL KNEE JOINT: Primary | ICD-10-CM

## 2025-07-31 DIAGNOSIS — B34.9 ACUTE VIRAL SYNDROME: Primary | ICD-10-CM

## 2025-07-31 LAB
BASOPHILS # BLD AUTO: 0.07 THOUSANDS/ÂΜL (ref 0–0.1)
BASOPHILS NFR BLD AUTO: 1 % (ref 0–1)
EOSINOPHIL # BLD AUTO: 0.37 THOUSAND/ÂΜL (ref 0–0.61)
EOSINOPHIL NFR BLD AUTO: 6 % (ref 0–6)
ERYTHROCYTE [DISTWIDTH] IN BLOOD BY AUTOMATED COUNT: 13.9 % (ref 11.6–15.1)
HCT VFR BLD AUTO: 43.1 % (ref 36.5–49.3)
HGB BLD-MCNC: 13.6 G/DL (ref 12–17)
IMM GRANULOCYTES # BLD AUTO: 0.02 THOUSAND/UL (ref 0–0.2)
IMM GRANULOCYTES NFR BLD AUTO: 0 % (ref 0–2)
LYMPHOCYTES # BLD AUTO: 1.84 THOUSANDS/ÂΜL (ref 0.6–4.47)
LYMPHOCYTES NFR BLD AUTO: 31 % (ref 14–44)
MCH RBC QN AUTO: 28.5 PG (ref 26.8–34.3)
MCHC RBC AUTO-ENTMCNC: 31.6 G/DL (ref 31.4–37.4)
MCV RBC AUTO: 90 FL (ref 82–98)
MONOCYTES # BLD AUTO: 1.29 THOUSAND/ÂΜL (ref 0.17–1.22)
MONOCYTES NFR BLD AUTO: 22 % (ref 4–12)
NEUTROPHILS # BLD AUTO: 2.37 THOUSANDS/ÂΜL (ref 1.85–7.62)
NEUTS SEG NFR BLD AUTO: 40 % (ref 43–75)
NRBC BLD AUTO-RTO: 0 /100 WBCS
PLATELET # BLD AUTO: 255 THOUSANDS/UL (ref 149–390)
PMV BLD AUTO: 11.8 FL (ref 8.9–12.7)
RBC # BLD AUTO: 4.78 MILLION/UL (ref 3.88–5.62)
SARS-COV-2 AG UPPER RESP QL IA: NEGATIVE
VALID CONTROL: NORMAL
WBC # BLD AUTO: 5.96 THOUSAND/UL (ref 4.31–10.16)

## 2025-07-31 PROCEDURE — 87811 SARS-COV-2 COVID19 W/OPTIC: CPT | Performed by: FAMILY MEDICINE

## 2025-07-31 PROCEDURE — G2211 COMPLEX E/M VISIT ADD ON: HCPCS | Performed by: FAMILY MEDICINE

## 2025-07-31 PROCEDURE — 36415 COLL VENOUS BLD VENIPUNCTURE: CPT

## 2025-07-31 PROCEDURE — 85025 COMPLETE CBC W/AUTO DIFF WBC: CPT

## 2025-07-31 PROCEDURE — 71046 X-RAY EXAM CHEST 2 VIEWS: CPT

## 2025-07-31 PROCEDURE — 99213 OFFICE O/P EST LOW 20 MIN: CPT | Performed by: FAMILY MEDICINE

## 2025-07-31 RX ORDER — RIFAMPIN 300 MG/1
300 CAPSULE ORAL EVERY 12 HOURS SCHEDULED
COMMUNITY

## 2025-07-31 RX ORDER — LORATADINE 10 MG/1
10 TABLET ORAL DAILY
Qty: 30 TABLET | Refills: 0 | Status: SHIPPED | OUTPATIENT
Start: 2025-07-31

## 2025-07-31 RX ORDER — FLUTICASONE PROPIONATE 50 MCG
1 SPRAY, SUSPENSION (ML) NASAL DAILY
Qty: 16 ML | Refills: 0 | Status: SHIPPED | OUTPATIENT
Start: 2025-07-31

## 2025-07-31 RX ORDER — CEFADROXIL 500 MG/1
500 CAPSULE ORAL EVERY 12 HOURS SCHEDULED
COMMUNITY

## 2025-08-01 ENCOUNTER — OFFICE VISIT (OUTPATIENT)
Dept: PHYSICAL THERAPY | Facility: REHABILITATION | Age: 75
End: 2025-08-01
Payer: MEDICARE

## 2025-08-01 DIAGNOSIS — Z96.652 PRESENCE OF LEFT ARTIFICIAL KNEE JOINT: Primary | ICD-10-CM

## 2025-08-01 PROCEDURE — 97110 THERAPEUTIC EXERCISES: CPT

## 2025-08-01 PROCEDURE — 97112 NEUROMUSCULAR REEDUCATION: CPT

## 2025-08-05 ENCOUNTER — OFFICE VISIT (OUTPATIENT)
Dept: PHYSICAL THERAPY | Facility: REHABILITATION | Age: 75
End: 2025-08-05
Payer: MEDICARE

## 2025-08-05 DIAGNOSIS — Z96.652 PRESENCE OF LEFT ARTIFICIAL KNEE JOINT: Primary | ICD-10-CM

## 2025-08-05 PROCEDURE — 97110 THERAPEUTIC EXERCISES: CPT

## 2025-08-05 PROCEDURE — 97112 NEUROMUSCULAR REEDUCATION: CPT

## 2025-08-07 ENCOUNTER — OFFICE VISIT (OUTPATIENT)
Dept: PHYSICAL THERAPY | Facility: REHABILITATION | Age: 75
End: 2025-08-07
Payer: MEDICARE

## 2025-08-07 DIAGNOSIS — Z96.652 PRESENCE OF LEFT ARTIFICIAL KNEE JOINT: Primary | ICD-10-CM

## 2025-08-07 PROCEDURE — 97110 THERAPEUTIC EXERCISES: CPT

## 2025-08-07 PROCEDURE — 97112 NEUROMUSCULAR REEDUCATION: CPT

## 2025-08-12 ENCOUNTER — OFFICE VISIT (OUTPATIENT)
Dept: PHYSICAL THERAPY | Facility: REHABILITATION | Age: 75
End: 2025-08-12
Payer: MEDICARE

## 2025-08-15 ENCOUNTER — OFFICE VISIT (OUTPATIENT)
Dept: PHYSICAL THERAPY | Facility: REHABILITATION | Age: 75
End: 2025-08-15
Payer: MEDICARE

## 2025-08-18 ENCOUNTER — APPOINTMENT (OUTPATIENT)
Dept: LAB | Facility: CLINIC | Age: 75
End: 2025-08-18
Payer: MEDICARE

## 2025-08-18 DIAGNOSIS — Z96.652 PRESENCE OF LEFT ARTIFICIAL KNEE JOINT: ICD-10-CM

## 2025-08-18 LAB
CRP SERPL HS-MCNC: 5.56 MG/L
ERYTHROCYTE [SEDIMENTATION RATE] IN BLOOD: 28 MM/HOUR (ref 0–19)

## 2025-08-18 PROCEDURE — 86141 C-REACTIVE PROTEIN HS: CPT

## 2025-08-18 PROCEDURE — 36415 COLL VENOUS BLD VENIPUNCTURE: CPT

## 2025-08-18 PROCEDURE — 85652 RBC SED RATE AUTOMATED: CPT

## 2025-08-19 ENCOUNTER — OFFICE VISIT (OUTPATIENT)
Dept: PHYSICAL THERAPY | Facility: REHABILITATION | Age: 75
End: 2025-08-19
Payer: MEDICARE

## 2025-08-19 DIAGNOSIS — Z96.652 PRESENCE OF LEFT ARTIFICIAL KNEE JOINT: Primary | ICD-10-CM

## 2025-08-19 PROCEDURE — 97110 THERAPEUTIC EXERCISES: CPT

## 2025-08-22 ENCOUNTER — OFFICE VISIT (OUTPATIENT)
Dept: PHYSICAL THERAPY | Facility: REHABILITATION | Age: 75
End: 2025-08-22
Payer: MEDICARE

## 2025-08-22 DIAGNOSIS — Z96.652 PRESENCE OF LEFT ARTIFICIAL KNEE JOINT: Primary | ICD-10-CM

## 2025-08-22 PROCEDURE — 97112 NEUROMUSCULAR REEDUCATION: CPT

## 2025-08-22 PROCEDURE — 97110 THERAPEUTIC EXERCISES: CPT

## (undated) DEVICE — PENROSE DRAIN, 18 X 3 8: Brand: CARDINAL HEALTH

## (undated) DEVICE — SUT PROLENE 2-0 RB-1/RB-1 36 IN 8559H

## (undated) DEVICE — INTENDED FOR TISSUE SEPARATION, AND OTHER PROCEDURES THAT REQUIRE A SHARP SURGICAL BLADE TO PUNCTURE OR CUT.: Brand: BARD-PARKER SAFETY BLADES SIZE 15, STERILE

## (undated) DEVICE — ANTIBACTERIAL UNDYED BRAIDED (POLYGLACTIN 910), SYNTHETIC ABSORBABLE SUTURE: Brand: COATED VICRYL

## (undated) DEVICE — SUT MONOCRYL 4-0 PS-2 27 IN Y426H

## (undated) DEVICE — BETHLEHEM UNIVERSAL MINOR GEN: Brand: CARDINAL HEALTH

## (undated) DEVICE — GLOVE INDICATOR PI UNDERGLOVE SZ 8 BLUE

## (undated) DEVICE — ADHESIVE SKIN HIGH VISCOSITY EXOFIN 1ML

## (undated) DEVICE — PLUMEPEN PRO 10FT

## (undated) DEVICE — NEEDLE 25G X 1 1/2

## (undated) DEVICE — PAD GROUNDING ADULT

## (undated) DEVICE — GLOVE SRG BIOGEL ORTHOPEDIC 7.5

## (undated) DEVICE — ROSEBUD DISSECTORS: Brand: DEROYAL

## (undated) DEVICE — CHLORAPREP HI-LITE 26ML ORANGE